# Patient Record
Sex: MALE | Race: WHITE | NOT HISPANIC OR LATINO | Employment: OTHER | URBAN - METROPOLITAN AREA
[De-identification: names, ages, dates, MRNs, and addresses within clinical notes are randomized per-mention and may not be internally consistent; named-entity substitution may affect disease eponyms.]

---

## 2017-01-31 ENCOUNTER — RADIANT APPOINTMENT (OUTPATIENT)
Dept: GENERAL RADIOLOGY | Facility: CLINIC | Age: 77
End: 2017-01-31
Attending: FAMILY MEDICINE
Payer: COMMERCIAL

## 2017-01-31 ENCOUNTER — OFFICE VISIT (OUTPATIENT)
Dept: FAMILY MEDICINE | Facility: CLINIC | Age: 77
End: 2017-01-31
Payer: COMMERCIAL

## 2017-01-31 VITALS
TEMPERATURE: 97.8 F | HEART RATE: 63 BPM | SYSTOLIC BLOOD PRESSURE: 133 MMHG | HEIGHT: 73 IN | OXYGEN SATURATION: 96 % | BODY MASS INDEX: 29.5 KG/M2 | RESPIRATION RATE: 12 BRPM | WEIGHT: 222.6 LBS | DIASTOLIC BLOOD PRESSURE: 71 MMHG

## 2017-01-31 DIAGNOSIS — R05.9 COUGH: ICD-10-CM

## 2017-01-31 DIAGNOSIS — C91.10 CLL (CHRONIC LYMPHOCYTIC LEUKEMIA) (H): ICD-10-CM

## 2017-01-31 DIAGNOSIS — D84.9 IMMUNOSUPPRESSION (H): Primary | ICD-10-CM

## 2017-01-31 DIAGNOSIS — J45.20 REACTIVE AIRWAY DISEASE, MILD INTERMITTENT, UNCOMPLICATED: ICD-10-CM

## 2017-01-31 PROCEDURE — 99214 OFFICE O/P EST MOD 30 MIN: CPT | Performed by: FAMILY MEDICINE

## 2017-01-31 PROCEDURE — 71020 XR CHEST 2 VW: CPT

## 2017-01-31 RX ORDER — ALBUTEROL SULFATE 90 UG/1
2 AEROSOL, METERED RESPIRATORY (INHALATION) EVERY 6 HOURS PRN
Qty: 1 INHALER | Refills: 0 | Status: SHIPPED | OUTPATIENT
Start: 2017-01-31 | End: 2017-05-12

## 2017-01-31 NOTE — NURSING NOTE
"Chief Complaint   Patient presents with     URI     cough for 2 months      Initial /71 mmHg  Pulse 63  Temp(Src) 97.8  F (36.6  C) (Oral)  Resp 12  Ht 6' 1\" (1.854 m)  Wt 222 lb 9.6 oz (100.971 kg)  BMI 29.37 kg/m2  SpO2 96% Estimated body mass index is 29.37 kg/(m^2) as calculated from the following:    Height as of this encounter: 6' 1\" (1.854 m).    Weight as of this encounter: 222 lb 9.6 oz (100.971 kg).  BP completed using cuff size large Right Arm    Health Maintenance reviewed - Yes: yes  Tobacco Verified: Yes  Family History Updated: Yes  MyChart Offered: Yes  Immunizations Up to Date:  Yes    Rose Espinoza MA     "

## 2017-01-31 NOTE — MR AVS SNAPSHOT
"              After Visit Summary   2017    Emory Clark    MRN: [id number]           Patient Information     Date Of Birth          1940        Visit Information        Provider Department      2017 11:15 AM Fabrice Humphreys MD [facility]        Today's Diagnoses     Immunosuppression (H)    -  1     Cough         CLL (chronic lymphocytic leukemia) (H)         Reactive airway disease, mild intermittent, uncomplicated            Follow-ups after your visit        Who to contact     If you have questions or need follow up information about today's clinic visit or your schedule please contact [facility] directly at [phone].  Normal or non-critical lab and imaging results will be communicated to you by Clueyhart, letter or phone within 4 business days after the clinic has received the results. If you do not hear from us within 7 days, please contact the clinic through Clueyhart or phone. If you have a critical or abnormal lab result, we will notify you by phone as soon as possible.  Submit refill requests through [employer] or call your pharmacy and they will forward the refill request to us. Please allow 3 business days for your refill to be completed.          Additional Information About Your Visit        MyChart Information     [employer] lets you send messages to your doctor, view your test results, renew your prescriptions, schedule appointments and more. To sign up, go to [email or web address] . Click on \"Log in\" on the left side of the screen, which will take you to the Welcome page. Then click on \"Sign up Now\" on the right side of the page.     You will be asked to enter the access code listed below, as well as some personal information. Please follow the directions to create your username and password.     Your access code is: [email or web address]  Expires: 2017 10:35 AM     Your access code will  in 90 days. If you need help or a new code, please " "call your [facility] or [phone].        Care EveryWhere ID     This is your Care EveryWhere ID. This could be used by other organizations to access your [place] medical records  [id number]        Your Vitals Were     Pulse Temperature Respirations Height BMI (Body Mass Index) Pulse Oximetry    63 97.8  F (36.6  C) (Oral) 12 6' 1\" (1.854 m) 29.37 kg/m2 96%       Blood Pressure from Last 3 Encounters:   01/31/17 133/71   11/29/16 124/70   12/01/15 120/65    Weight from Last 3 Encounters:   01/31/17 222 lb 9.6 oz (100.971 kg)   11/29/16 223 lb (101.152 kg)   12/01/15 194 lb (87.998 kg)              We Performed the Following     XR Chest 2 Views          Today's Medication Changes          These changes are accurate as of: 1/31/17 12:13 PM.  If you have any questions, ask your nurse or doctor.               Start taking these medicines.        Dose/Directions    albuterol 108 (90 BASE) MCG/ACT Inhaler   Commonly known as:  PROAIR HFA/PROVENTIL HFA/VENTOLIN HFA   Used for:  CLL (chronic lymphocytic leukemia) (H), Reactive airway disease, mild intermittent, uncomplicated   Started by:  Fabrice Humphreys MD        Dose:  2 puff   Inhale 2 puffs into the lungs every 6 hours as needed for shortness of breath / dyspnea or wheezing   Quantity:  1 Inhaler   Refills:  0            Where to get your medicines      These medications were sent to [facility] [address] [facility] - [place], [place] - [address]  [facility]  [address]  [facility], [facility] [address]     Phone:  [phone]    - albuterol 108 (90 BASE) MCG/ACT Inhaler             Primary Care Provider Office Phone # Fax #    Fabrice Humphreys [phone]       [facility] [id number] [address]        Thank you!     Thank you for choosing [facility]  for your care. Our goal is always to provide you with excellent care. Hearing back from our patients is one way we can continue to improve " our services. Please take a few minutes to complete the written survey that you may receive in the mail after your visit with us. Thank you!             Your Updated Medication List - Protect others around you: Learn how to safely use, store and throw away your medicines at www.disposemymeds.org.          This list is accurate as of: 1/31/17 12:13 PM.  Always use your most recent med list.                   Brand Name Dispense Instructions for use    albuterol 108 (90 BASE) MCG/ACT Inhaler    PROAIR HFA/PROVENTIL HFA/VENTOLIN HFA    1 Inhaler    Inhale 2 puffs into the lungs every 6 hours as needed for shortness of breath / dyspnea or wheezing       amoxicillin-clavulanate 875-125 MG per tablet    AUGMENTIN    20 tablet    Take 1 tablet by mouth 2 times daily       azithromycin 250 MG tablet    ZITHROMAX    6 tablet    Two tablets first day, then one tablet daily for four days.       CALCIUM 600+D3 600-400 MG-UNIT per tablet   Generic drug:  calcium-vitamin D      Take 1 tablet by mouth daily       IMBRUVICA 140 MG capsule   Generic drug:  ibrutinib      Take by mouth daily       MELATONIN PO      Take 3 mg by mouth nightly as needed       MULTIVITAMINS PO      Take 1 tablet by mouth daily       traZODone 50 MG tablet    DESYREL    135 tablet    Take one or two at bedtime as needed for sleep       VITAMIN B COMPLEX PO      Take 1 tablet by mouth daily       Vitamin C 500 MG Caps      Take 1 tablet by mouth daily

## 2017-01-31 NOTE — PROGRESS NOTES
SUBJECTIVE:                                                    Emory Clark is a 76 year old male who presents to clinic today for the following health issues:  He has CLL issues and has had a cough on and off for a month or two. NO sputum, no fever, no weight loss, no sweats, no malaise: he has ONCOLOGY at [facility]  HE'S on immunosuppressive meds. : I asked him to query the cough issue with his cancer doctors as potential med side effect or chemotherapy  No soboe    Acute Illness   Acute illness concerns: URI   Onset: 2 months      Fever: no     Chills/Sweats: no     Headache (location?): no     Sinus Pressure:no    Conjunctivitis:  no    Ear Pain: no    Rhinorrhea: no    Congestion: no    Sore Throat: no      Cough: YES    Wheeze: YES, at night     Decreased Appetite: no     Nausea: no     Vomiting: no     Diarrhea:  no     Dysuria/Freq.: no     Fatigue/Achiness: no     Sick/Strep Exposure: no      Therapies Tried and outcome: none       On exam the vital signs are stable  Weight is .mbi   Eyes show fernando   No neck masses or thyromegaly.Ear nose and throat shows normal   No bruits, murmers, rubs or extrasounds. No cardiomegaly or chest wall tenderness. Lungs: chest xray clear, He has some scattered wheeze  no abdominal masses or organomegaly. No CVA tenderness.  Skin eval no rash   No hernias, good range of motion neck, back and extremities. No abnormal skin lesions. Normal genitalia. Good peripheral pulses. No adenopathy.  Normal gait and stance. Neck is supple.  Back exam shows normal range of motion  (D89.9) Immunosuppression (H)  (primary encounter diagnosis)  Comment:   Plan:     (R05) Cough  Comment:   Plan: XR Chest 2 Views  He will Query his Oncologist        (C91.10) CLL (chronic lymphocytic leukemia) (H)  Comment:   Plan: XR Chest 2 Views, albuterol (PROAIR         HFA/PROVENTIL HFA/VENTOLIN HFA) 108 (90 BASE)         MCG/ACT Inhaler        bronchodilatro trial,    (J45.20) Reactive airway disease, mild  intermittent, uncomplicated  Comment:   Plan: albuterol (PROAIR HFA/PROVENTIL HFA/VENTOLIN         HFA) 108 (90 BASE) MCG/ACT Inhaler

## 2017-02-17 ENCOUNTER — TELEPHONE (OUTPATIENT)
Dept: FAMILY MEDICINE | Facility: CLINIC | Age: 77
End: 2017-02-17

## 2017-02-17 NOTE — TELEPHONE ENCOUNTER
Panel Management Review      Patient has the following on his problem list: None      Composite cancer screening  Chart review shows that this patient is due/due soon for the following None  Summary:    Patient is due/failing the following:   AAP and ACT    Action needed:   Patient needs to do ACT.    Type of outreach:    None, routed to provider for review.    Questions for provider review:    Dr. Humphreys- please add asthma to patients problem list                                                                                                                                    Marcelino Lorenz CMA       Chart routed to Provider.

## 2017-02-18 PROBLEM — J45.30 MILD PERSISTENT ASTHMA: Status: ACTIVE | Noted: 2017-02-18

## 2017-03-04 ENCOUNTER — TRANSFERRED RECORDS (OUTPATIENT)
Dept: HEALTH INFORMATION MANAGEMENT | Facility: CLINIC | Age: 77
End: 2017-03-04

## 2017-03-04 ENCOUNTER — HOSPITAL ENCOUNTER (EMERGENCY)
Facility: CLINIC | Age: 77
Discharge: HOME | End: 2017-03-04
Attending: EMERGENCY MEDICINE | Admitting: EMERGENCY MEDICINE
Payer: COMMERCIAL

## 2017-03-04 VITALS
DIASTOLIC BLOOD PRESSURE: 86 MMHG | RESPIRATION RATE: 18 BRPM | TEMPERATURE: 98.1 F | OXYGEN SATURATION: 92 % | HEART RATE: 60 BPM | SYSTOLIC BLOOD PRESSURE: 154 MMHG

## 2017-03-04 DIAGNOSIS — L03.032 PARONYCHIA OF FIFTH TOE OF LEFT FOOT: ICD-10-CM

## 2017-03-04 PROCEDURE — 87077 CULTURE AEROBIC IDENTIFY: CPT | Performed by: EMERGENCY MEDICINE

## 2017-03-04 PROCEDURE — 87186 SC STD MICRODIL/AGAR DIL: CPT | Performed by: EMERGENCY MEDICINE

## 2017-03-04 PROCEDURE — 87070 CULTURE OTHR SPECIMN AEROBIC: CPT | Performed by: EMERGENCY MEDICINE

## 2017-03-04 PROCEDURE — 99283 EMERGENCY DEPT VISIT LOW MDM: CPT

## 2017-03-04 RX ORDER — GINSENG 100 MG
CAPSULE ORAL
Status: DISCONTINUED
Start: 2017-03-04 | End: 2017-03-04 | Stop reason: HOSPADM

## 2017-03-04 RX ORDER — SULFAMETHOXAZOLE/TRIMETHOPRIM 800-160 MG
1 TABLET ORAL 2 TIMES DAILY
Qty: 20 TABLET | Refills: 0 | Status: SHIPPED | OUTPATIENT
Start: 2017-03-04 | End: 2017-03-14

## 2017-03-04 ASSESSMENT — ENCOUNTER SYMPTOMS
FEVER: 0
NAUSEA: 0
VOMITING: 0
COLOR CHANGE: 1
CHILLS: 0

## 2017-03-04 NOTE — ED NOTES
Patient presents with left small toe pain. He states that he pulled a hang nail a few days ago and now is havinng increased redness and swelling. He states that he noted puss draining from toe as well.

## 2017-03-04 NOTE — ED AVS SNAPSHOT
[facility]    [address] [place] [address]    Phone:  [phone]    Fax:  [phone]                                       Emory Clark   MRN: [id number]    Department:  [facility]   Date of Visit:  3/4/2017           Patient Information     Date Of Birth          1940        Your diagnoses for this visit were:     Paronychia of fifth toe of left foot        You were seen by Bertin Issa MD.      Follow-up Information     Please follow up.    Why:  Please recheck with your doctors at [place] [place] if not improved within 3 days. if you have fever, chills, increasing pain, spreading redness, or any other concernsreturn to the ER right away.        Discharge Instructions         Paronychia of the Finger or Toe  Paronychia is an infection near a fingernail or toenail. It usually occurs when an opening in the cuticle or an ingrown toenail lets bacteria under the skin.  The infection will need to be drained if pus is present. If the infection has been caught early, you may need only antibiotic treatment. Healing will take about 1 to 2 weeks.  Home care  Follow these guidelines when caring for yourself at home:    Clean and soak the toe or finger. Do this twice a day for the first 3 days. To do so:    Soak your foot or hand in a tub of warm water for 5 minutes. Or hold your toe or finger under a faucet of warm running water for 5 minutes.    Clean any crust away with soap and water using a cotton swab.    Put antibiotic ointment on the infected area.    Change the dressing daily or any time it becomes soiled.    If you were given antibiotics, take them as directed until they are all gone.    If your infection is on a toe, wear comfortable shoes with a lot of toe room. You can also wear open-toed sandals while your toe heals.    You may use acetaminophen or ibuprofen to help with pain, unless another medicine was  prescribed. If you have chronic liver or kidney disease, talk with your health care provider before using these medicines. Also talk with your provider if you've had a stomach ulcer or GI bleeding.  Prevention  The following can prevent paronychia:    Trim or push down the skin around the nail (cuticle).    Don't bite your nails.    Don't suck on your thumbs or fingers.  Follow-up care  Follow up with your health care provider, or as advised.  When to seek medical advice  Call your health care provider right away if any of these occur:    Redness, pain, or swelling of the finger or toe gets worse    Red streaks in the skin leading away from the wound    Pus or fluid drain from the nail area    Fever of 100.4 F (38 C) or higher, or as directed by your health care provider    0093-5741 [employer]. [address] [place] [address]. All rights reserved. This information is not intended as a substitute for professional medical care. Always follow your healthcare professional's instructions.          Cellulitis  Cellulitis is an infection of the deep layers of skin. A break in the skin, such as a cut or scratch, can let bacteria under the skin. If the bacteria get to deep layers of the skin, it can be serious. If not treated, cellulitis can get into the bloodstream and lymph nodes. The infection can then spread throughout the body. This causes serious illness.  Cellulitis causes the affected skin to become red, swollen, warm, and sore. The reddened areas have a visible border. An open sore may leak fluid (pus). You may have a fever, chills, and pain.  Cellulitis is treated with antibiotics taken for 7 to 10 days. An open sore may be cleaned and covered with cool wet gauze. Symptoms should get better 1 to 2 days after treatment is started. Make sure to take all the antibiotics for the full number of days until they are gone. Keep taking the medicine even if your symptoms go away.  Home care  Follow  these tips:    Limit the use of the part of your body with cellulitis. Movement can cause the infection to spread.    If the infection is on your leg, walk as little as possible in the first few days of the treatment. Keep your leg raised while sitting. This will help to reduce swelling.    Take all of the antibiotic medicine exactly as directed until it is gone. Do not miss any doses, especially during the first 7 days. Don t stop taking the medicine when your symptoms get better.    Keep the affected area clean and dry.    Wash your hands with soap and warm water before and after touching your skin. Anyone else who touches your skin should also wash his or her hands. Don't share towels.  Follow-up care  Follow up with your healthcare provider. If your infection does not go away on 1 antibiotic, your healthcare provider will prescribe a different one.  When to seek medical advice  Call your healthcare provider right away if any of these occur:    Red areas that spread    Swelling or pain that gets worse    Fluid leaking from the skin (pus)    Fever higher of 100.4  F (38.0  C) or higher after 2 days on antibiotics    9046-5450 [employer]. [address] [place] [address]. All rights reserved. This information is not intended as a substitute for professional medical care. Always follow your healthcare professional's instructions.          24 Hour Appointment Hotline       To make an appointment at any [facility] clinic, call [phone] ([phone]). If you don't have a family doctor or clinic, we will help you find one. [facility] clinics are conveniently located to serve the needs of you and your family.             Review of your medicines      START taking        Dose / Directions Last dose taken    sulfamethoxazole-trimethoprim 800-160 MG per tablet   Commonly known as:  BACTRIM DS   Dose:  1 tablet   Quantity:  20 tablet        Take 1 tablet by mouth 2 times daily for 10 days    Refills:  0          Our records show that you are taking the medicines listed below. If these are incorrect, please call your family doctor or clinic.        Dose / Directions Last dose taken    albuterol 108 (90 BASE) MCG/ACT Inhaler   Commonly known as:  PROAIR HFA/PROVENTIL HFA/VENTOLIN HFA   Dose:  2 puff   Quantity:  1 Inhaler        Inhale 2 puffs into the lungs every 6 hours as needed for shortness of breath / dyspnea or wheezing   Refills:  0        amoxicillin-clavulanate 875-125 MG per tablet   Commonly known as:  AUGMENTIN   Dose:  1 tablet   Quantity:  20 tablet        Take 1 tablet by mouth 2 times daily for 10 days   Refills:  0        azithromycin 250 MG tablet   Commonly known as:  ZITHROMAX   Quantity:  6 tablet        Two tablets first day, then one tablet daily for four days.   Refills:  0        CALCIUM 600+D3 600-400 MG-UNIT per tablet   Dose:  1 tablet   Generic drug:  calcium-vitamin D        Take 1 tablet by mouth daily   Refills:  0        IMBRUVICA 140 MG capsule   Generic drug:  ibrutinib        Take by mouth daily   Refills:  0        MELATONIN PO   Dose:  3 mg        Take 3 mg by mouth nightly as needed   Refills:  0        MULTIVITAMINS PO   Dose:  1 tablet        Take 1 tablet by mouth daily   Refills:  0        traZODone 50 MG tablet   Commonly known as:  DESYREL   Quantity:  135 tablet        Take one or two at bedtime as needed for sleep   Refills:  1        VITAMIN B COMPLEX PO   Dose:  1 tablet        Take 1 tablet by mouth daily   Refills:  0        Vitamin C 500 MG Caps   Dose:  1 tablet        Take 1 tablet by mouth daily   Refills:  0                Prescriptions were sent or printed at these locations (2 Prescriptions)                   Other Prescriptions                Printed at Department/Unit printer (2 of 2)         sulfamethoxazole-trimethoprim (BACTRIM DS) 800-160 MG per tablet               amoxicillin-clavulanate (AUGMENTIN) 875-125 MG per tablet                 Procedures and tests performed during your visit     Wound Culture Aerobic Bacterial      Orders Needing Specimen Collection     None      Pending Results     Date and Time Order Name Status Description    3/4/2017 1809 Wound Culture Aerobic Bacterial In process             Pending Culture Results     Date and Time Order Name Status Description    3/4/2017 1809 Wound Culture Aerobic Bacterial In process              Test Results from your hospital stay     3/4/2017  6:17 PM - Interface, Flexilab Results                Clinical Quality Measure: Blood Pressure Screening     Your blood pressure was checked while you were in the emergency department today. The last reading we obtained was  BP: 145/74 . Please read the guidelines below about what these numbers mean and what you should do about them.  If your systolic blood pressure (the top number) is less than 120 and your diastolic blood pressure (the bottom number) is less than 80, then your blood pressure is normal. There is nothing more that you need to do about it.  If your systolic blood pressure (the top number) is 120-139 or your diastolic blood pressure (the bottom number) is 80-89, your blood pressure may be higher than it should be. You should have your blood pressure rechecked within a year by a primary care provider.  If your systolic blood pressure (the top number) is 140 or greater or your diastolic blood pressure (the bottom number) is 90 or greater, you may have high blood pressure. High blood pressure is treatable, but if left untreated over time it can put you at risk for heart attack, stroke, or kidney failure. You should have your blood pressure rechecked by a primary care provider within the next 4 weeks.  If your provider in the emergency department today gave you specific instructions to follow-up with your doctor or provider even sooner than that, you should follow that instruction and not wait for up to 4 weeks for your follow-up visit.       "  Thank you for choosing [facility]       Thank you for choosing [facility] for your care. Our goal is always to provide you with excellent care. Hearing back from our patients is one way we can continue to improve our services. Please take a few minutes to complete the written survey that you may receive in the mail after you visit with us. Thank you!        [employer] Information     [employer] lets you send messages to your doctor, view your test results, renew your prescriptions, schedule appointments and more. To sign up, go to [email or web address] . Click on \"Log in\" on the left side of the screen, which will take you to the Welcome page. Then click on \"Sign up Now\" on the right side of the page.     You will be asked to enter the access code listed below, as well as some personal information. Please follow the directions to create your username and password.     Your access code is: [email or web address]  Expires: 2017  6:12 PM     Your access code will  in 90 days. If you need help or a new code, please call your [facility] clinic or [phone].        Care EveryWhere ID     This is your Care EveryWhere ID. This could be used by other organizations to access your [facility] medical records  [id number]        After Visit Summary       This is your record. Keep this with you and show to your community pharmacist(s) and doctor(s) at your next visit.                  "

## 2017-03-04 NOTE — ED AVS SNAPSHOT
[facility]    [address]    [facility] [address]    Phone:  [phone]    Fax:  [phone]                                       Emory Clark   MRN: [id number]    Department:  [facility]   Date of Visit:  3/4/2017           After Visit Summary Signature Page     I have received my discharge instructions, and my questions have been answered. I have discussed any challenges I see with this plan with the nurse or doctor.    ..........................................................................................................................................  Patient/Patient Representative Signature      ..........................................................................................................................................  Patient Representative Print Name and Relationship to Patient    ..................................................               ................................................  Date                                            Time    ..........................................................................................................................................  Reviewed by Signature/Title    ...................................................              ..............................................  Date                                                            Time

## 2017-03-04 NOTE — ED PROVIDER NOTES
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 The patient states that about 2-3 days ago he had a \"hang-nail\" to his left small toe which he subsequently pulled. This action caused him pain initially and his toe has been moderately painful since that time, but he though nothing of it. During the course of the day today however, he noted that his toe became acutely more painful and \"cherry red,\" with additional spreading redness to the top of his foot. His toe expressed some pus about 2 hours prior to arrival with significant improvement in his pain. He has otherwise been feeling well with no fevers, nausea, vomiting, or other systemic symptoms.     Allergies:  Altaryl    Medications:    Albuterol  Azithromycin  Imbruvica    Past Medical History:    Asthma   GERD  CLL  Hyperlipidemia      Past Surgical History:    Appendectomy   Cholecystectomy    Family History:    Father positive for CLL  Mother positive for Parkinson's disease     Social History:  Negative for tobacco use.  Positive for alcohol use.   The patient and his wife plan on wintering in [facility] and will be leaving tomorrow; they are very excited.   Marital Status:   [2]    Review of Systems   Constitutional: Negative for chills and fever.   Gastrointestinal: Negative for nausea and vomiting.   Skin: Positive for color change (redness to top of foot and L 5th toe).   All other systems reviewed and are negative.    Physical Exam   First Vitals:  BP: 161/69  Pulse: 60  Heart Rate: 60  Temp: 98.1  F (36.7  C)  Resp: 18  SpO2: 99 %      Physical Exam  Constitutional:  Appears well-developed and well-nourished. Alert. Conversant. Non toxic.       HENT:   Head: Atraumatic.   Nose: Nose normal.   Mouth/Throat: Oropharynx is clear and moist.   Eyes: Conjunctivae normal. EOM are grossly normal. Pupils are equal, " round, and reactive to light. No scleral icterus.   Neck: Normal range of motion. No tracheal deviation present.   Cardiovascular: Normal rate, regular rhythm. Symmetric DP artery pulses.  Pulmonary/Chest: Effort normal. No stridor. No respiratory distress.  Musculoskeletal: Normal except for Left foot. No other abnormality noted.   Left lower extremity: hip, thigh, knee, calf, shin, ankle are normal.  Foot  No tenderness or swelling over the heel or midfoot.  There is mild erythema on the dorsum of the left foot spreading proximally from the 5th toe with a small tendril what appears to be ascending lymphangitis about two thirds of the way toward the ankle.   The toes are normal save for the 5th toe which is erythematous and red.   The lateral portion of the toenail plate is gone which is consistent with the patient's reported history of removing it at home.  There is a small amount of purulent drainage from underneath the toenail border consistent with a draining paronychia..  No crepitus.   I was able to palpate the toe when I don't feel any large contained fluid collection. He has normal capillary refill in the toes.  There is a small amount of dry hard skin consistent with a callus on the tip of the 5th toe,, but I don't think this represents chronic gangrene.  Normal ROM in the toes.   Neurological: Alert and oriented to person, place, and time. Normal strength. CN II-VII intact. No sensory deficit. GCS eye subscore is 4. GCS verbal subscore is 5. GCS motor subscore is 6. Normal coordination . Intact distal sensory and motor function.  Skin: Skin is warm and dry. No rash noted. No pallor. Normal capillary refill.  Psychiatric:  normal mood and affect.    Emergency Department Course     Nursing notes and vitals reviewed. I performed an exam of the patient as documented above.     Wound cultures sent and pending.     Findings and plan explained to the Patient. Patient discharged home with instructions regarding  supportive care, medications, and reasons to return. The importance of close follow-up was reviewed.     Impression & Plan    Medical Decision Making:  Emory Clark is a 76 year old male currently on Imbruvica for CLL who follows with oncology at [place]. This is felt to be well controlled. He is here with pain and redness, swelling, and purulent drainage from his left 5th toe after he pulled out a hang nail about 2 days ago. He was sent over from a minute[facility]clinic with concern for a paronychia and with concern that he may need to have his toenail removed and provided IV antibiotics.     He is non-toxic here in the ED, but does have evidence of a paronychia on that side as well as left toe cellulitis and some ascending lymphangitis up the dorsum of his left foot. He is otherwise non-toxic and afebrile. In terms of the paronychia; it is already draining pus. We discussed the definitive treatment would be toenail removal or at least an I and D along the edge of the nail plate, but the patient would like to avoid this. Given that this is already draining it seems reasonable to try to treat this with antibiotics before performing another intervention, however we did discuss the possibility of a loculated infection and he understands that this may be improve on antibiotics alone and that he may require an I and D: he is willing and would prefer to wait. Likewise, with his history of CLL, he is at higher risk than average for sepsis from this infection. I recommended that we at least check some lab testing to look at his blood counts, but he declines these as he is in a hurry to leave. Therefore, will start him on antibiotics and have selected Augmentin which will provide good coverage for Staph and Strep, as well as other gram negative infections on the foot and will also add bactrim for possible community acquired MRSA. We were able to sterilely prep his toe and then milk some pus from the toenail which came out easily  which would suggest adequate drainage and we were able to send a wound culture from this drainage and will assist to tailor antibiotic therapy.     The patient is traveling to [place] and leaving in the morning. At this point, with reasonable clinical confidence, I think he is safe to travel. He will follow up with [facility] in [facility]. He is counseled to follow up within 3 days without tremendous improvement or come to the ED immediately should he have increasing redness, fever, pain, body aches, or any other concerns.     Diagnosis:    ICD-10-CM    1. Paronychia of fifth toe of left foot L03.032 Wound Culture Aerobic Bacterial     Discharge Medications:  Discharge Medication List as of 3/4/2017  6:20 PM      START taking these medications    Details   sulfamethoxazole-trimethoprim (BACTRIM DS) 800-160 MG per tablet Take 1 tablet by mouth 2 times daily for 10 days, Disp-20 tablet, R-0, Local Print           IJr, am serving as a scribe on 3/4/2017 at 5:42 PM to personally document services performed by Bertin Issa MD based on my observations and the provider's statements to me.     3/4/2017   [facility] EMERGENCY DEPARTMENT       Bertin Issa MD  03/05/17 2111

## 2017-03-05 NOTE — DISCHARGE INSTRUCTIONS
Paronychia of the Finger or Toe  Paronychia is an infection near a fingernail or toenail. It usually occurs when an opening in the cuticle or an ingrown toenail lets bacteria under the skin.  The infection will need to be drained if pus is present. If the infection has been caught early, you may need only antibiotic treatment. Healing will take about 1 to 2 weeks.  Home care  Follow these guidelines when caring for yourself at home:    Clean and soak the toe or finger. Do this twice a day for the first 3 days. To do so:    Soak your foot or hand in a tub of warm water for 5 minutes. Or hold your toe or finger under a faucet of warm running water for 5 minutes.    Clean any crust away with soap and water using a cotton swab.    Put antibiotic ointment on the infected area.    Change the dressing daily or any time it becomes soiled.    If you were given antibiotics, take them as directed until they are all gone.    If your infection is on a toe, wear comfortable shoes with a lot of toe room. You can also wear open-toed sandals while your toe heals.    You may use acetaminophen or ibuprofen to help with pain, unless another medicine was prescribed. If you have chronic liver or kidney disease, talk with your health care provider before using these medicines. Also talk with your provider if you've had a stomach ulcer or GI bleeding.  Prevention  The following can prevent paronychia:    Trim or push down the skin around the nail (cuticle).    Don't bite your nails.    Don't suck on your thumbs or fingers.  Follow-up care  Follow up with your health care provider, or as advised.  When to seek medical advice  Call your health care provider right away if any of these occur:    Redness, pain, or swelling of the finger or toe gets worse    Red streaks in the skin leading away from the wound    Pus or fluid drain from the nail area    Fever of 100.4 F (38 C) or higher, or as directed by your health care provider    8676-1656  [employer]. [address] [place] [address], [place] [address]. All rights reserved. This information is not intended as a substitute for professional medical care. Always follow your healthcare professional's instructions.          Cellulitis  Cellulitis is an infection of the deep layers of skin. A break in the skin, such as a cut or scratch, can let bacteria under the skin. If the bacteria get to deep layers of the skin, it can be serious. If not treated, cellulitis can get into the bloodstream and lymph nodes. The infection can then spread throughout the body. This causes serious illness.  Cellulitis causes the affected skin to become red, swollen, warm, and sore. The reddened areas have a visible border. An open sore may leak fluid (pus). You may have a fever, chills, and pain.  Cellulitis is treated with antibiotics taken for 7 to 10 days. An open sore may be cleaned and covered with cool wet gauze. Symptoms should get better 1 to 2 days after treatment is started. Make sure to take all the antibiotics for the full number of days until they are gone. Keep taking the medicine even if your symptoms go away.  Home care  Follow these tips:    Limit the use of the part of your body with cellulitis. Movement can cause the infection to spread.    If the infection is on your leg, walk as little as possible in the first few days of the treatment. Keep your leg raised while sitting. This will help to reduce swelling.    Take all of the antibiotic medicine exactly as directed until it is gone. Do not miss any doses, especially during the first 7 days. Don t stop taking the medicine when your symptoms get better.    Keep the affected area clean and dry.    Wash your hands with soap and warm water before and after touching your skin. Anyone else who touches your skin should also wash his or her hands. Don't share towels.  Follow-up care  Follow up with your healthcare provider. If your infection does not go away on 1  antibiotic, your healthcare provider will prescribe a different one.  When to seek medical advice  Call your healthcare provider right away if any of these occur:    Red areas that spread    Swelling or pain that gets worse    Fluid leaking from the skin (pus)    Fever higher of 100.4  F (38.0  C) or higher after 2 days on antibiotics    6308-2412 [employer]. [address] [place] [address]. All rights reserved. This information is not intended as a substitute for professional medical care. Always follow your healthcare professional's instructions.

## 2017-03-06 LAB
BACTERIA SPEC CULT: ABNORMAL
MICRO REPORT STATUS: ABNORMAL
MICROORGANISM SPEC CULT: ABNORMAL
SPECIMEN SOURCE: ABNORMAL

## 2017-03-07 ENCOUNTER — TELEPHONE (OUTPATIENT)
Dept: EMERGENCY MEDICINE | Facility: CLINIC | Age: 77
End: 2017-03-07

## 2017-03-07 NOTE — TELEPHONE ENCOUNTER
"[facility] Emergency Department Lab result notification:    [place] ED lab result protocol used  General culture protocol - Wound    Reason for call  Notify of lab results, assess symptoms,  review ED providers recommendations/discharge instructions (if necessary) and advise per ED lab result f/u protocol    Lab Result  Final Wound culture report on 03/07/2017  [place] ED discharge antibiotic: Sulfamethoxazole-Trimethoprim (Bactrim DS, Septra DS) 800-160 mg PO tablet, Take 1 tablet by mouth 2 times daily for 10 days and Cephalexin (Keflex) 500 mg capsule, Take 1 tablet by mouth 2 times daily for 10 days  #1. Bacteria, Moderate growth Staphylococcus aureus , which is SUSCEPTIBLE to ED discharge antibiotic - Both  Incision and Drainage performed in [place] ED [Yes / No]: no  Patient to be notified of result, symptoms's assessed and advised per [place] ED lab result protocol.  Information table from ED Provider visit on 03/04/2017  Symptoms reported at ED visit (Chief complaint, HPI) a 76 year old male with history of CLL on Imbruvica who presents with his wife for evaluation of left 5th toe pain. The patient states that about 2-3 days ago he had a \"hang-nail\" to his left small toe which he subsequently pulled. This action caused him pain initially and his toe has been moderately painful since that time, but he though nothing of it. During the course of the day today however, he noted that his toe became acutely more painful and \"cherry red,\" with additional spreading redness to the top of his foot. His toe expressed some pus about 2 hours prior to arrival with significant improvement in his pain. He has otherwise been feeling well with no fevers, nausea, vomiting, or other systemic symptoms.    ED providers Impression and Plan (applicable information) In terms of the paronychia; it is already draining pus. We discussed the definitive treatment would be toenail removal or at least an I and D along the edge of " the nail plate, but the patient would like to avoid this. Given that this is already draining it seems reasonable to try to treat this with antibiotics before performing another intervention, however we did discuss the possibility of a loculated infection and he understands that this may be improve on antibiotics alone and that he may require an I and D: he is willing and would prefer to wait. Likewise, with his history of CLL, he is at higher risk than average for sepsis from this infection. I recommended that we at least check some lab testing to look at his blood counts, but he declines these as he is in a hurry to leave. Therefore, will start him on antibiotics and have selected Augmentin which will provide good coverage for Staph and Strep, as well as other gram negative infections on the foot and will also add bactrim for possible community acquired MRSA. We were able to sterilely prep his toe and then milk some pus from the toenail which came out easily which would suggest adequate drainage and we were able to send a wound culture from this drainage and will assist to tailor antibiotic therapy     RN Assessment (Patient s current Symptoms), include time called.  [Insert Left message here if message left]  At [address] its not red any more and almost back to normal.    RN Recommendations/Instructions per [place] ED lab result protocol  Continue antibiotics, regardless of symptoms until they are gone.    Please Contact your PCP clinic or return to the Emergency department if your:    Symptoms return    Symptoms worsen or other concerning symptom's.    Emily Maria RN  [facility] RN  Lung Nodule and ED Lab Result F/u RN  Epic pool (ED late result f/u RN): [id number]  FV INCIDENTAL RADIOLOGY F/U NURSES: P [id number]  # [phone]      Copy of Lab result   Exam Information   Exam Date Exam Time Accession # Results    3/4/17  6:00 PM     Component Results   Component Collected Lab    Specimen Description 03/04/2017  6:00 PM 77   Toe 5TH Wound   Culture Micro (Abnormal) 03/04/2017  6:00    Moderate growth Staphylococcus aureus   Micro Report Status 03/04/2017  6:00    FINAL 03/06/2017   Organism: 03/04/2017  6:00    Moderate growth Staphylococcus aureus   Culture & Susceptibility   MODERATE GROWTH STAPHYLOCOCCUS AUREUS (BIJU)   Antibiotic Sensitivity Unit Status   CIPROFLOXACIN <=0.5 Susceptible ug/mL Final   CLINDAMYCIN <=0.25 Susceptible ug/mL Final   ERYTHROMYCIN <=0.25 Susceptible ug/mL Final   GENTAMICIN <=0.5 Susceptible ug/mL Final   LEVOFLOXACIN <=0.12 Susceptible ug/mL Final   OXACILLIN 0.5 Susceptible ug/mL Final   PENICILLIN >=0.5 Resistant ug/mL Final   TETRACYCLINE <=1 Susceptible ug/mL Final   Trimethoprim/Sulfa <=.5/9.5 Susceptible ug/mL Final   VANCOMYCIN 1 Susceptible ug/mL Final

## 2017-05-12 ENCOUNTER — OFFICE VISIT (OUTPATIENT)
Dept: FAMILY MEDICINE | Facility: CLINIC | Age: 77
End: 2017-05-12
Payer: COMMERCIAL

## 2017-05-12 VITALS
HEIGHT: 72 IN | TEMPERATURE: 97.6 F | BODY MASS INDEX: 30.2 KG/M2 | RESPIRATION RATE: 16 BRPM | SYSTOLIC BLOOD PRESSURE: 142 MMHG | WEIGHT: 223 LBS | OXYGEN SATURATION: 96 % | DIASTOLIC BLOOD PRESSURE: 75 MMHG | HEART RATE: 62 BPM

## 2017-05-12 DIAGNOSIS — C91.10 CHRONIC LYMPHOCYTIC LEUKEMIA (H): ICD-10-CM

## 2017-05-12 DIAGNOSIS — J45.30 MILD PERSISTENT ASTHMA: ICD-10-CM

## 2017-05-12 DIAGNOSIS — J20.9 ACUTE BRONCHITIS, UNSPECIFIED ORGANISM: Primary | ICD-10-CM

## 2017-05-12 PROCEDURE — 99213 OFFICE O/P EST LOW 20 MIN: CPT | Performed by: FAMILY MEDICINE

## 2017-05-12 RX ORDER — AZITHROMYCIN 250 MG/1
TABLET, FILM COATED ORAL
Qty: 6 TABLET | Refills: 0 | Status: SHIPPED | OUTPATIENT
Start: 2017-05-12 | End: 2017-05-22

## 2017-05-12 NOTE — MR AVS SNAPSHOT
"              After Visit Summary   2017    Emory Clark    MRN: [id number]           Patient Information     Date Of Birth          1940        Visit Information        Provider Department      2017 1:15 PM Ari Boland MD [facility]        Today's Diagnoses     Acute bronchitis, unspecified organism    -  1       Follow-ups after your visit        Who to contact     If you have questions or need follow up information about today's clinic visit or your schedule please contact [facility] directly at [phone].  Normal or non-critical lab and imaging results will be communicated to you by [facility], letter or phone within 4 business days after the clinic has received the results. If you do not hear from us within 7 days, please contact the clinic through [employer] [facility] or phone. If you have a critical or abnormal lab result, we will notify you by phone as soon as possible.  Submit refill requests through [employer] or call your pharmacy and they will forward the refill request to us. Please allow 3 business days for your refill to be completed.          Additional Information About Your Visit        MyChart Information     [employer] lets you send messages to your doctor, view your test results, renew your prescriptions, schedule appointments and more. To sign up, go to [email or web address] . Click on \"Log in\" on the left side of the screen, which will take you to the Welcome page. Then click on \"Sign up Now\" on the right side of the page.     You will be asked to enter the access code listed below, as well as some personal information. Please follow the directions to create your username and password.     Your access code is: [email or web address]  Expires: 2017  7:12 PM     Your access code will  in 90 days. If you need help or a new code, please call your [facility] or [phone].        Care EveryWhere ID     This is your Care EveryWhere ID. This could be used " by other organizations to access your [place] medical records  [id number]        Your Vitals Were     Pulse Temperature Respirations Height Pulse Oximetry BMI (Body Mass Index)    62 97.6  F (36.4  C) (Oral) 16 6' (1.829 m) 96% 30.24 kg/m2       Blood Pressure from Last 3 Encounters:   05/12/17 142/75   03/04/17 154/86   01/31/17 133/71    Weight from Last 3 Encounters:   05/12/17 223 lb (101.2 kg)   01/31/17 222 lb 9.6 oz (101 kg)   11/29/16 223 lb (101.2 kg)              Today, you had the following     No orders found for display         Today's Medication Changes          These changes are accurate as of: 5/12/17  1:46 PM.  If you have any questions, ask your nurse or doctor.               Start taking these medicines.        Dose/Directions    azithromycin 250 MG tablet   Commonly known as:  ZITHROMAX   Used for:  Acute bronchitis, unspecified organism   Started by:  Ari Boland MD        Take 2 pills today, then 1 pill for 4 days.   Quantity:  6 tablet   Refills:  0            Where to get your medicines      These medications were sent to [facility] [address] [facility] - [place], [place] - [address] [facility]  [address] [facility] [address]     Phone:  [phone]     azithromycin 250 MG tablet                Primary Care Provider Office Phone # Fax #    Fabrice Humphreys [phone]       [facility] [id number] [place] [id number]        Thank you!     Thank you for choosing [facility]  for your care. Our goal is always to provide you with excellent care. Hearing back from our patients is one way we can continue to improve our services. Please take a few minutes to complete the written survey that you may receive in the mail after your visit with us. Thank you!             Your Updated Medication List - Protect others around you: Learn how to safely use, store and throw away your medicines at www.disposemymeds.org.          This list is  accurate as of: 5/12/17  1:46 PM.  Always use your most recent med list.                   Brand Name Dispense Instructions for use    azithromycin 250 MG tablet    ZITHROMAX    6 tablet    Take 2 pills today, then 1 pill for 4 days.       CALCIUM 600+D3 600-400 MG-UNIT per tablet   Generic drug:  calcium-vitamin D      Take 1 tablet by mouth daily       IMBRUVICA 140 MG capsule   Generic drug:  ibrutinib      Take by mouth daily       MULTIVITAMINS PO      Take 1 tablet by mouth daily       traZODone 50 MG tablet    DESYREL    135 tablet    Take one or two at bedtime as needed for sleep       VITAMIN B COMPLEX PO      Take 1 tablet by mouth daily       Vitamin C 500 MG Caps      Take 1 tablet by mouth daily

## 2017-05-12 NOTE — LETTER
My Asthma Action Plan  Name: Emory Clark   YOB: 1940  Date: 5/15/2017   My doctor: Ari Boland MD   My clinic: [facility]        My Control Medicine:   My Rescue Medicine:    My Asthma Severity: mild persistent  Avoid your asthma triggers: smoke, upper respiratory infections, dust mites, pollens, animal dander, insects/rodents, mold, humidity, aspirin, strong odors and fumes, occupational exposure, exercise or sports, emotions, cold air and Gastric Reflux               GREEN ZONE     Good Control    I feel good    No cough or wheeze    Can work, sleep and play without asthma symptoms       Take your asthma control medicine every day.     1. If exercise triggers your asthma, take your rescue medication    15 minutes before exercise or sports, and    During exercise if you have asthma symptoms  2. Spacer to use with inhaler: If you have a spacer, make sure to use it with your inhaler             YELLOW ZONE     Getting Worse  I have ANY of these:    I do not feel good    Cough or wheeze    Chest feels tight    Wake up at night   1. Keep taking your Green Zone medications  2. Start taking your rescue medicine:    every 20 minutes for up to 1 hour. Then every 4 hours for 24-48 hours.  3. If you stay in the Yellow Zone for more than 12-24 hours, contact your doctor.  4. If you do not return to the Green Zone in 12-24 hours or you get worse, start taking your oral steroid medicine if prescribed by your provider.           RED ZONE     Medical Alert - Get Help  I have ANY of these:    I feel awful    Medicine is not helping    Breathing getting harder    Trouble walking or talking    Nose opens wide to breathe       1. Take your rescue medicine NOW  2. If your provider has prescribed an oral steroid medicine, start taking it NOW  3. Call your doctor NOW  4. If you are still in the Red Zone after 20 minutes and you have not reached your doctor:    Take your rescue medicine again  and    Call 911 or go to the emergency room right away    See your regular doctor within 2 weeks of an Emergency Room or Urgent Care visit for follow-up treatment.        Electronically signed by: Sonia Kern, May 15, 2017    Annual Reminders:  Meet with Asthma Educator,  Flu Shot in the Fall, consider Pneumonia Vaccination for patients with asthma (aged 19 and older).    Pharmacy: [facility] [phone] [place] [id number]  [address]                    Asthma Triggers  How To Control Things That Make Your Asthma Worse    Triggers are things that make your asthma worse.  Look at the list below to help you find your triggers and what you can do about them.  You can help prevent asthma flare-ups by staying away from your triggers.      Trigger                                                          What you can do   Cigarette Smoke  Tobacco smoke can make asthma worse. Do not allow smoking in your home, car or around you.  Be sure no one smokes at a child s day care or school.  If you smoke, ask your health care provider for ways to help you quit.  Ask family members to quit too.  Ask your health care provider for a referral to Quit Plan to help you quit smoking, or call 4-445-162-PLAN.     Colds, Flu, Bronchitis  These are common triggers of asthma. Wash your hands often.  Don t touch your eyes, nose or mouth.  Get a flu shot every year.     Dust Mites  These are tiny bugs that live in cloth or carpet. They are too small to see. Wash sheets and blankets in hot water every week.   Encase pillows and mattress in dust mite proof covers.  Avoid having carpet if you can. If you have carpet, vacuum weekly.   Use a dust mask and HEPA vacuum.   Pollen and Outdoor Mold  Some people are allergic to trees, grass, or weed pollen, or molds. Try to keep your windows closed.  Limit time out doors when pollen count is high.   Ask you health care provider about taking medicine during allergy season.     Animal  Dander  Some people are allergic to skin flakes, urine or saliva from pets with fur or feathers. Keep pets with fur or feathers out of your home.    If you can t keep the pet outdoors, then keep the pet out of your bedroom.  Keep the bedroom door closed.  Keep pets off cloth furniture and away from stuffed toys.     Mice, Rats, and Cockroaches  Some people are allergic to the waste from these pests.   Cover food and garbage.  Clean up spills and food crumbs.  Store grease in the refrigerator.   Keep food out of the bedroom.   Indoor Mold  This can be a trigger if your home has high moisture. Fix leaking faucets, pipes, or other sources of water.   Clean moldy surfaces.  Dehumidify basement if it is damp and smelly.   Smoke, Strong Odors, and Sprays  These can reduce air quality. Stay away from strong odors and sprays, such as perfume, powder, hair spray, paints, smoke incense, paint, cleaning products, candles and new carpet.   Exercise or Sports  Some people with asthma have this trigger. Be active!  Ask your doctor about taking medicine before sports or exercise to prevent symptoms.    Warm up for 5-10 minutes before and after sports or exercise.     Other Triggers of Asthma  Cold air:  Cover your nose and mouth with a scarf.  Sometimes laughing or crying can be a trigger.  Some medicines and food can trigger asthma.

## 2017-05-12 NOTE — PROGRESS NOTES
SUBJECTIVE:                                                    Emory Clark is a 76 year old male who presents to clinic today for the following health issues:      Acute Illness   Acute illness concerns: cough  Onset: 1 week    Fever: no    Chills/Sweats: no    Headache (location?): YES    Sinus Pressure:YES- post-nasal drainage and facial pain    Conjunctivitis:  YES: bilateral    Ear Pain: no    Rhinorrhea: YES    Congestion: YES    Sore Throat: no     Cough: YES-productive of clear sputum    Wheeze: YES    Decreased Appetite: no    Nausea: no    Vomiting: no    Diarrhea:  no    Dysuria/Freq.: no    Fatigue/Achiness: YES    Sick/Strep Exposure: no     Therapies Tried and outcome: OTC medications.      Pt does have hx of lymphoma, and he is on Imbruvica     Problem list and histories reviewed & adjusted, as indicated.  Additional history: as documented    Patient Active Problem List   Diagnosis     Chronic lymphocytic leukemia (H)     Elevated PSA     Insomnia     GERD (gastroesophageal reflux disease)     CARDIOVASCULAR SCREENING; LDL GOAL LESS THAN 160     Acute renal failure (H)     Pneumonia     Abnormal EKG     Elevated brain natriuretic peptide (BNP) level     URI (upper respiratory infection)     Health Care Home     Pleural effusion     NSTEMI (non-ST elevated myocardial infarction) (H)     Cardiomyopathy (H)     Anemia associated with chemotherapy     Immunosuppression (H)     Mild persistent asthma     Past Surgical History:   Procedure Laterality Date     basal cell excision       CHOLECYSTECTOMY       LAPAROSCOPIC APPENDECTOMY         Social History   Substance Use Topics     Smoking status: Never Smoker     Smokeless tobacco: Never Used     Alcohol use 0.0 oz/week     0 Standard drinks or equivalent per week      Comment: rare     Family History   Problem Relation Age of Onset     Neurologic Disorder Mother      Parkinsons     Other Cancer Father      CLL         Current Outpatient Prescriptions    Medication Sig Dispense Refill     azithromycin (ZITHROMAX) 250 MG tablet Take 2 pills today, then 1 pill for 4 days. 6 tablet 0     ibrutinib (IMBRUVICA) 140 MG capsule Take by mouth daily       traZODone (DESYREL) 50 MG tablet Take one or two at bedtime as needed for sleep 135 tablet 1     Multiple Vitamin (MULTIVITAMINS PO) Take 1 tablet by mouth daily        B Complex Vitamins (VITAMIN B COMPLEX PO) Take 1 tablet by mouth daily        Ascorbic Acid (VITAMIN C) 500 MG CAPS Take 1 tablet by mouth daily        calcium-vitamin D (CALCIUM 600+D3) 600-400 MG-UNIT per tablet Take 1 tablet by mouth daily       Allergies   Allergen Reactions     Antihistamine [Altaryl]        Reviewed and updated as needed this visit by clinical staff  Tobacco  Allergies  Fam Hx  Soc Hx      Reviewed and updated as needed this visit by Provider         ROS:  C: NEGATIVE for fever, chills, change in weight  RESP:occasional shortness of breath when he cough.  CV: NEGATIVE for chest pain, palpitations or peripheral edema    OBJECTIVE:                                                    /75 (BP Location: Right arm, Patient Position: Chair, Cuff Size: Adult Regular)  Pulse 62  Temp 97.6  F (36.4  C) (Oral)  Resp 16  Ht 6' (1.829 m)  Wt 223 lb (101.2 kg)  SpO2 96%  BMI 30.24 kg/m2  Body mass index is 30.24 kg/(m^2).  Head: Normocephalic, atraumatic.  Eyes: Conjunctiva clear, non icteric. PERRLA.  Ears: External ears nl, TM is nl   Nose: Septum midline, nasal mucosa congested. No discharge.  There is no tenderness over the maxillary sinuses, there is no tenderness over the frontal sinuses  Mouth / Throat: Normal dentition.  No oral lesions. Pharynx mild erythematous, tonsils no exudate/hypertrophy.  Neck: Supple, no enlarged LN, trachea midline.  LUNGS:  CTA B/L, no wheezing or crackles.  CVS : RRR, no murmur, no rub.             ASSESSMENT/PLAN:                                                            1. Acute bronchitis,  unspecified organism  Given the patient hx of CLL on chemotherapy, I will star ton   - azithromycin (ZITHROMAX) 250 MG tablet; Take 2 pills today, then 1 pill for 4 days.  Dispense: 6 tablet; Refill: 0    2. Chronic lymphocytic leukemia (H)        Follow up in 5 days if symptoms persist, sooner if symptoms worsen or new ones develops, pt may contact us over the phone for any questions or concerns.      Ari Boland MD  [facility]

## 2017-05-15 ENCOUNTER — TELEPHONE (OUTPATIENT)
Dept: FAMILY MEDICINE | Facility: CLINIC | Age: 77
End: 2017-05-15

## 2017-05-15 NOTE — TELEPHONE ENCOUNTER
I wonder if PCP would review and advise before we call.   Statin or aspirin indicated?   Ramsey Robertson RN

## 2017-05-15 NOTE — TELEPHONE ENCOUNTER
I sent Franko a letter (I think I've seen him once) asking that he get a new echo and see Cardiology.  We'll see if there are [facility] records we don't have.

## 2017-05-15 NOTE — TELEPHONE ENCOUNTER
Thanks Dr. Humphreys.   We called patient about [facility] records. And Dr. Humphreys's comment.  Pt states he is not aware of history of MI.   We scheduled face-to-face visit with PCP (pt reported Dr. Humphreys is PCP) one week from today to discuss history and possible refer to cardiology.   Ramsey Robertson RN

## 2017-05-15 NOTE — LETTER
[facility]  [address] [place], [address]  [phone]        May 15, 2017    Emory Clark                                                                                                                                                       [phone]  [phone]            Dear Emory,    Sorry that you had bronchial issues again.   The question came up as to whether you have seen the heart specialist at [facility] or up here.   It would be good to have an echocardiogram done and see the Cardiologist in consultation since the lung and blood issues are hard on the heart also and your 2014 echo while you were ill in hospital was not normal. We should establish where it's at now.    Let me know what you think : I'd like to have you see the Cardiologists here in [place].         Fabrice Boland MD

## 2017-05-22 ENCOUNTER — OFFICE VISIT (OUTPATIENT)
Dept: FAMILY MEDICINE | Facility: CLINIC | Age: 77
End: 2017-05-22
Payer: COMMERCIAL

## 2017-05-22 VITALS
WEIGHT: 224.6 LBS | TEMPERATURE: 98 F | HEIGHT: 72 IN | SYSTOLIC BLOOD PRESSURE: 132 MMHG | OXYGEN SATURATION: 97 % | HEART RATE: 55 BPM | RESPIRATION RATE: 14 BRPM | BODY MASS INDEX: 30.42 KG/M2 | DIASTOLIC BLOOD PRESSURE: 73 MMHG

## 2017-05-22 DIAGNOSIS — Z92.21 STATUS POST CHEMOTHERAPY: Primary | ICD-10-CM

## 2017-05-22 DIAGNOSIS — R94.30 EJECTION FRACTION < 50%: ICD-10-CM

## 2017-05-22 PROCEDURE — 99214 OFFICE O/P EST MOD 30 MIN: CPT | Performed by: FAMILY MEDICINE

## 2017-05-22 NOTE — NURSING NOTE
Chief Complaint   Patient presents with     cardiology referral     upper respiratory symptoms still present       Initial /73 (BP Location: Left arm, Patient Position: Chair, Cuff Size: Adult Large)  Pulse 55  Temp 98  F (36.7  C) (Oral)  Resp 14  Ht 6' (1.829 m)  Wt 224 lb 9.6 oz (101.9 kg)  SpO2 97%  BMI 30.46 kg/m2 Estimated body mass index is 30.46 kg/(m^2) as calculated from the following:    Height as of this encounter: 6' (1.829 m).    Weight as of this encounter: 224 lb 9.6 oz (101.9 kg).  Medication Reconciliation: complete   Jaxson Trevino CMA

## 2017-05-22 NOTE — MR AVS SNAPSHOT
"              After Visit Summary   5/22/2017    Emory Clark    MRN: [id number]           Patient Information     Date Of Birth          1940        Visit Information        Provider Department      5/22/2017 3:15 PM Fabrice Humphreys MD [facility]        Today's Diagnoses     Status post chemotherapy    -  1    Ejection fraction < 50%           Follow-ups after your visit        Future tests that were ordered for you today     Open Future Orders        Priority Expected Expires Ordered    Echocardiogram Complete Routine  5/22/2018 5/22/2017            Who to contact     If you have questions or need follow up information about today's clinic visit or your schedule please contact [facility] directly at [phone].  Normal or non-critical lab and imaging results will be communicated to you by MyChart, letter or phone within 4 business days after the clinic has received the results. If you do not hear from us within 7 days, please contact the clinic through MyChart or phone. If you have a critical or abnormal lab result, we will notify you by phone as soon as possible.  Submit refill requests through [employer] or call your pharmacy and they will forward the refill request to us. Please allow 3 business days for your refill to be completed.          Additional Information About Your Visit        MyChart Information     Liquid lets you send messages to your doctor, view your test results, renew your prescriptions, schedule appointments and more. To sign up, go to [email or web address] . Click on \"Log in\" on the left side of the screen, which will take you to the Welcome page. Then click on \"Sign up Now\" on the right side of the page.     You will be asked to enter the access code listed below, as well as some personal information. Please follow the directions to create your username and password.     Your access code is: [email or web address]  Expires: 6/2/2017  7:12 PM   "   Your access code will  in 90 days. If you need help or a new code, please call your [facility] clinic or [phone].        Care EveryWhere ID     This is your Care EveryWhere ID. This could be used by other organizations to access your [facility] medical records  [id number]        Your Vitals Were     Pulse Temperature Respirations Height Pulse Oximetry BMI (Body Mass Index)    55 98  F (36.7  C) (Oral) 14 6' (1.829 m) 97% 30.46 kg/m2       Blood Pressure from Last 3 Encounters:   17 132/73   17 142/75   17 154/86    Weight from Last 3 Encounters:   17 224 lb 9.6 oz (101.9 kg)   17 223 lb (101.2 kg)   17 222 lb 9.6 oz (101 kg)               Primary Care Provider Office Phone # Fax #    Fabrice Darryl Humphreys [phone]       [address] [address]        Thank you!     Thank you for choosing [facility]  for your care. Our goal is always to provide you with excellent care. Hearing back from our patients is one way we can continue to improve our services. Please take a few minutes to complete the written survey that you may receive in the mail after your visit with us. Thank you!             Your Updated Medication List - Protect others around you: Learn how to safely use, store and throw away your medicines at www.disposemymeds.org.          This list is accurate as of: 17  3:41 PM.  Always use your most recent med list.                   Brand Name Dispense Instructions for use    CALCIUM 600+D3 600-400 MG-UNIT per tablet   Generic drug:  calcium-vitamin D      Take 1 tablet by mouth daily       IMBRUVICA 140 MG capsule   Generic drug:  ibrutinib      Take by mouth daily       MULTIVITAMINS PO      Take 1 tablet by mouth daily       traZODone 50 MG tablet    DESYREL    135 tablet    Take one or two at bedtime as needed for sleep       VITAMIN B COMPLEX PO      Take 1 tablet by mouth  daily

## 2017-05-22 NOTE — PROGRESS NOTES
SUBJECTIVE:                                                    Emory Clark is a 76 year old male who presents to clinic today for the following health issues:      Patient presents today with some upper respiratory symptoms still present, but states that it is improving.  Not sure why he is being referred to a Cardiologist, thought it was just some bronchitis issues.  SUBJECTIVE:    CC: Emory Clark is a 76 year old male who presents for chronic CLL with treatment at [place]    HPI: had reduced EF on echo three years ago, needs follow up No chest pain. 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        PROBLEM LIST:                   Patient Active Problem List   Diagnosis     Chronic lymphocytic leukemia (H)     Elevated PSA     Insomnia     GERD (gastroesophageal reflux disease)     CARDIOVASCULAR SCREENING; LDL GOAL LESS THAN 160     Acute renal failure (H)     Pneumonia     Abnormal EKG     Elevated brain natriuretic peptide (BNP) level     URI (upper respiratory infection)     Health Care Home     Pleural effusion     NSTEMI (non-ST elevated myocardial infarction) (H)     Cardiomyopathy (H)     Anemia associated with chemotherapy     Immunosuppression (H)     Mild persistent asthma       PAST MEDICAL HISTORY:                  Past Medical History:   Diagnosis Date     Basal cell carcinoma of face      CARDIOVASCULAR SCREENING; LDL GOAL LESS THAN 160 1/8/2014     CLL (chronic lymphocytic leukaemia)      Elevated PSA      GERD (gastroesophageal reflux disease)      Insomnia      Pleural effusion 7/10/2014       PAST SURGICAL HISTORY:                  Past Surgical History:   Procedure Laterality Date     basal cell excision       CHOLECYSTECTOMY       LAPAROSCOPIC APPENDECTOMY         CURRENT MEDICATIONS:                  Current Outpatient Prescriptions   Medication Sig Dispense Refill     ibrutinib (IMBRUVICA) 140 MG capsule Take by mouth daily       traZODone (DESYREL) 50 MG  tablet Take one or two at bedtime as needed for sleep 135 tablet 1     Multiple Vitamin (MULTIVITAMINS PO) Take 1 tablet by mouth daily        B Complex Vitamins (VITAMIN B COMPLEX PO) Take 1 tablet by mouth daily        calcium-vitamin D (CALCIUM 600+D3) 600-400 MG-UNIT per tablet Take 1 tablet by mouth daily               FAMILY HISTORY:                   Family History   Problem Relation Age of Onset     Neurologic Disorder Mother      Parkinsons     Other Cancer Father      CLL       HEALTH MAINTENANCE:                    REVIEW OF OUTSIDE RECORDS: NO    REVIEW OF SYSTEMS:  C: NEGATIVE for fever, chills  E: NEGATIVE for vision changes   R: NEGATIVE for significant cough or SOB  CV: NEGATIVE for chest pain, palpitations   GI: NEGATIVE for nausea, abdominal pain, heartburn, or change in bowel habits  : NEGATIVE for frequency, dysuria, or hematuria  M: NEGATIVE for significant arthralgias or myalgia  N: NEGATIVE for weakness, dizziness or paresthesias or headache  NVS:no headache or balance issus  INTEG:no moles or new rashes  LYMPH:no nodes or night sweats    EXAM:    /73 (BP Location: Left arm, Patient Position: Chair, Cuff Size: Adult Large)  Pulse 55  Temp 98  F (36.7  C) (Oral)  Resp 14  Ht 6' (1.829 m)  Wt 224 lb 9.6 oz (101.9 kg)  SpO2 97%  BMI 30.46 kg/m2  GENERAL APPEARANCE: healthy, alert and no distress   EXAM:  GENERAL APPEARANCE: healthy, alert and no distress  EYES: EOMI,  PERRL  HENT: ear canals and TM's normal and nose and mouth without ulcers or lesions  RESP: lungs clear to auscultation - no rales, rhonchi or wheezes  CV: regular rates and rhythm, normal S1 S2, no S3 or S4 and no murmur, click or rub -  ABDOMEN:  soft, nontender, no HSM or masses and bowel sounds normal  MS: extremities normal- no gross deformities noted, no evidence of inflammation in joints, FROM in all extremities.  SKIN: no suspicious lesions or rashes  BACK:no tenderness or pain on straight let raise          He's had [facility] ct to screen his aorta as part of his Oncology Care.  ASSESSMENT/PLAN  1. Status post chemotherapy    2. Ejection fraction < 50%      Discussed records from [facility] and to them                             I have discussed with patient the risks, benefits, medications, treatment options and modalities.   I have instructed the patient to call or schedule a follow-up appointment if any problems or failure to improve.

## 2017-05-23 ASSESSMENT — PATIENT HEALTH QUESTIONNAIRE - PHQ9: SUM OF ALL RESPONSES TO PHQ QUESTIONS 1-9: 2

## 2017-05-23 ASSESSMENT — ASTHMA QUESTIONNAIRES: ACT_TOTALSCORE: 24

## 2017-06-07 ENCOUNTER — HOSPITAL ENCOUNTER (OUTPATIENT)
Dept: CARDIOLOGY | Facility: CLINIC | Age: 77
Discharge: HOME | End: 2017-06-07
Attending: FAMILY MEDICINE | Admitting: FAMILY MEDICINE
Payer: COMMERCIAL

## 2017-06-07 DIAGNOSIS — Z92.21 STATUS POST CHEMOTHERAPY: ICD-10-CM

## 2017-06-07 DIAGNOSIS — R94.30 EJECTION FRACTION < 50%: ICD-10-CM

## 2017-06-07 PROCEDURE — 93306 TTE W/DOPPLER COMPLETE: CPT

## 2017-06-07 PROCEDURE — 93306 TTE W/DOPPLER COMPLETE: CPT | Mod: 26 | Performed by: INTERNAL MEDICINE

## 2017-08-10 ENCOUNTER — TRANSFERRED RECORDS (OUTPATIENT)
Dept: HEALTH INFORMATION MANAGEMENT | Facility: CLINIC | Age: 77
End: 2017-08-10

## 2017-08-10 LAB
AST SERPL-CCNC: 23 U/L (ref 8–48)
CREAT SERPL-MCNC: 0.9 MG/DL (ref 0.8–1.3)

## 2017-09-21 ENCOUNTER — DOCUMENTATION ONLY (OUTPATIENT)
Dept: OTHER | Facility: CLINIC | Age: 77
End: 2017-09-21

## 2017-09-21 PROBLEM — Z71.89 ADVANCED DIRECTIVES, COUNSELING/DISCUSSION: Chronic | Status: ACTIVE | Noted: 2017-09-21

## 2018-01-09 ENCOUNTER — OFFICE VISIT (OUTPATIENT)
Dept: FAMILY MEDICINE | Facility: CLINIC | Age: 78
End: 2018-01-09
Payer: COMMERCIAL

## 2018-01-09 VITALS
TEMPERATURE: 97.5 F | BODY MASS INDEX: 29.27 KG/M2 | HEART RATE: 55 BPM | RESPIRATION RATE: 14 BRPM | OXYGEN SATURATION: 96 % | DIASTOLIC BLOOD PRESSURE: 68 MMHG | HEIGHT: 72 IN | WEIGHT: 216.1 LBS | SYSTOLIC BLOOD PRESSURE: 116 MMHG

## 2018-01-09 DIAGNOSIS — C91.10 CHRONIC LYMPHOCYTIC LEUKEMIA (H): ICD-10-CM

## 2018-01-09 DIAGNOSIS — I21.4 NSTEMI (NON-ST ELEVATED MYOCARDIAL INFARCTION) (H): ICD-10-CM

## 2018-01-09 DIAGNOSIS — Z00.00 WELLNESS EXAMINATION: Primary | ICD-10-CM

## 2018-01-09 DIAGNOSIS — Z87.448 HISTORY OF ACUTE RENAL FAILURE: ICD-10-CM

## 2018-01-09 PROCEDURE — 80053 COMPREHEN METABOLIC PANEL: CPT | Performed by: FAMILY MEDICINE

## 2018-01-09 PROCEDURE — 99397 PER PM REEVAL EST PAT 65+ YR: CPT | Performed by: FAMILY MEDICINE

## 2018-01-09 PROCEDURE — 80061 LIPID PANEL: CPT | Performed by: FAMILY MEDICINE

## 2018-01-09 PROCEDURE — 36415 COLL VENOUS BLD VENIPUNCTURE: CPT | Performed by: FAMILY MEDICINE

## 2018-01-09 NOTE — MR AVS SNAPSHOT
After Visit Summary   1/9/2018    Emory Clark    MRN: [id number]           Patient Information     Date Of Birth          1940        Visit Information        Provider Department      1/9/2018 9:15 AM Fabrice Humphreys MD [facility]        Today's Diagnoses     Wellness examination    -  1    Chronic lymphocytic leukemia (H)        History of acute renal failure        NSTEMI (non-ST elevated myocardial infarction) (H)          Care Instructions      Preventive Health Recommendations:   Male Ages 65 and over    Yearly exam:             See your health care provider every year in order to  o   Review health changes.   o   Discuss preventive care.    o   Review your medicines if your doctor has prescribed any.    Talk with your health care provider about whether you should have a test to screen for prostate cancer (PSA).    Every 3 years, have a diabetes test (fasting glucose). If you are at risk for diabetes, you should have this test more often.    Every 5 years, have a cholesterol test. Have this test more often if you are at risk for high cholesterol or heart disease.     Every 10 years, have a colonoscopy. Or, have a yearly FIT test (stool test). These exams will check for colon cancer.    Talk to with your health care provider about screening for Abdominal Aortic Aneurysm if you have a family history of AAA or have a history of smoking.    Shots:     Get a flu shot each year.     Get a tetanus shot every 10 years.     Talk to your doctor about your pneumonia vaccines. There are now two you should receive - Pneumovax (PPSV 23) and Prevnar (PCV 13).     Talk to your doctor about a shingles vaccine.     Talk to your doctor about the hepatitis B vaccine.  Nutrition:     Eat at least 5 servings of fruits and vegetables each day.     Eat whole-grain bread, whole-wheat pasta and brown rice instead of white grains and rice.     Talk to your provider about Calcium and Vitamin  "D.   Lifestyle    Exercise for at least 150 minutes a week (30 minutes a day, 5 days a week). This will help you control your weight and prevent disease.     Limit alcohol to one drink per day.     No smoking.     Wear sunscreen to prevent skin cancer.     See your dentist every six months for an exam and cleaning.     See your eye doctor every 1 to 2 years to screen for conditions such as glaucoma, macular degeneration, cataracts, etc           Follow-ups after your visit        Who to contact     If you have questions or need follow up information about today's clinic visit or your schedule please contact [facility] directly at [phone].  Normal or non-critical lab and imaging results will be communicated to you by MyChart, letter or phone within 4 business days after the clinic has received the results. If you do not hear from us within 7 days, please contact the clinic through [email or web address] or phone. If you have a critical or abnormal lab result, we will notify you by phone as soon as possible.  Submit refill requests through [employer] or call your pharmacy and they will forward the refill request to us. Please allow 3 business days for your refill to be completed.          Additional Information About Your Visit        [email or web address] Information     [employer] lets you send messages to your doctor, view your test results, renew your prescriptions, schedule appointments and more. To sign up, go to [email or web address] . Click on \"Log in\" on the left side of the screen, which will take you to the Welcome page. Then click on \"Sign up Now\" on the right side of the page.     You will be asked to enter the access code listed below, as well as some personal information. Please follow the directions to create your username and password.     Your access code is: [email or web address]  Expires: 2018  9:51 AM     Your access code will  in 90 days. If you need help or a new code, please call your [place] clinic " or [phone].        Care EveryWhere ID     This is your Care EveryWhere ID. This could be used by other organizations to access your [facility] medical records  [id number]        Your Vitals Were     Pulse Temperature Respirations Height Pulse Oximetry BMI (Body Mass Index)    55 97.5  F (36.4  C) (Oral) 14 6' (1.829 m) 96% 29.31 kg/m2       Blood Pressure from Last 3 Encounters:   01/09/18 116/68   05/22/17 132/73   05/12/17 142/75    Weight from Last 3 Encounters:   01/09/18 216 lb 1.6 oz (98 kg)   05/22/17 224 lb 9.6 oz (101.9 kg)   05/12/17 223 lb (101.2 kg)              We Performed the Following     Comprehensive metabolic panel     Lipid panel reflex to direct LDL Fasting        Primary Care Provider Office Phone # Fax #    Fabrice Humphreys [phone] [address] [facility] [address]        Equal Access to Services     [facility] Hospital[facility]ERIDAN : Hadii aad ku hadasho Soomaali, waaxda luqadaha, qaybta kaalmada adeegyada, waxay idiin haymarissa crocker . So [facility] [phone].    ATENCIÓN: Si habla español, tiene a campuzano disposición servicios gratuitos de asistencia lingüística. Llame al [phone].    We comply with applicable federal civil rights laws and [place] laws. We do not discriminate on the basis of race, color, national origin, age, disability, sex, sexual orientation, or gender identity.            Thank you!     Thank you for choosing [facility]  for your care. Our goal is always to provide you with excellent care. Hearing back from our patients is one way we can continue to improve our services. Please take a few minutes to complete the written survey that you may receive in the mail after your visit with us. Thank you!             Your Updated Medication List - Protect others around you: Learn how to safely use, store and throw away your medicines at www.disposemymeds.org.          This list is accurate as of: 1/9/18  9:55 AM.  Always use  your most recent med list.                   Brand Name Dispense Instructions for use Diagnosis    CALCIUM 600+D3 600-400 MG-UNIT per tablet   Generic drug:  calcium-vitamin D      Take 1 tablet by mouth daily        IMBRUVICA 140 MG capsule   Generic drug:  ibrutinib      Take by mouth daily        MULTIVITAMINS PO      Take 1 tablet by mouth daily        traZODone 50 MG tablet    DESYREL    135 tablet    Take one or two at bedtime as needed for sleep    Pleural effusion       VITAMIN B COMPLEX PO      Take 1 tablet by mouth daily

## 2018-01-09 NOTE — LETTER
January 12, 2018      Emory Clark  [address] [address] [facility] [address]        Dear ,    We are writing to inform you of your test results.    Your blood is still good. Keep up the good diet and weight habits and enjoy the rest of the winter.    Resulted Orders   Lipid panel reflex to direct LDL Fasting   Result Value Ref Range    Cholesterol 131 <200 mg/dL    Triglycerides 128 <150 mg/dL      Comment:      Fasting specimen    HDL Cholesterol 37 (L) >39 mg/dL    LDL Cholesterol Calculated 68 <100 mg/dL      Comment:      Desirable:       <100 mg/dl    Non HDL Cholesterol 94 <130 mg/dL   Comprehensive metabolic panel   Result Value Ref Range    Sodium 140 133 - 144 mmol/L    Potassium 4.6 3.4 - 5.3 mmol/L    Chloride 105 94 - 109 mmol/L    Carbon Dioxide 28 20 - 32 mmol/L    Anion Gap 7 3 - 14 mmol/L    Glucose 85 70 - 99 mg/dL      Comment:      Fasting specimen    Urea Nitrogen 20 7 - 30 mg/dL    Creatinine 0.86 0.66 - 1.25 mg/dL    GFR Estimate 87 >60 mL/min/1.7m2      Comment:      Non  GFR Calc    GFR Estimate If Black >90 >60 mL/min/1.7m2      Comment:       GFR Calc    Calcium 8.6 8.5 - 10.1 mg/dL    Bilirubin Total 0.7 0.2 - 1.3 mg/dL    Albumin 4.0 3.4 - 5.0 g/dL    Protein Total 6.5 (L) 6.8 - 8.8 g/dL    Alkaline Phosphatase 72 40 - 150 U/L    ALT 31 0 - 70 U/L    AST 19 0 - 45 U/L       If you have any questions or concerns, please call the clinic at the number listed above.       Sincerely,        Fabrice Humphreys MD

## 2018-01-09 NOTE — PROGRESS NOTES
SUBJECTIVE:   Emory Clark is a 77 year old male who presents for Preventive Visit.    He feels his labile at work   Are you in the first 12 months of your Medicare Part B coverage?  No    Healthy Habits:    Do you get at least three servings of calcium containing foods daily (dairy, green leafy vegetables, etc.)? yes    Amount of exercise or daily activities, outside of work: walking everyday    Problems taking medications regularly No    Medication side effects: No    Have you had an eye exam in the past two years? yes    Do you see a dentist twice per year? yes    Do you have sleep apnea, excessive snoring or daytime drowsiness?no      Ability to successfully perform activities of daily living: Yes, no assistance needed    Home safety:  none identified     Hearing impairment: No    Fall risk:             COGNITIVE SCREEN  1) Repeat 3 items (Banana, Sunrise, Chair)    2) Clock draw: NORMAL  3) 3 item recall:   Recalls 3 objects  Results: 3 items recalled: COGNITIVE IMPAIRMENT LESS LIKELY    Mini-CogTM Copyright MCKENZIE Vieyra. Licensed by the author for use in [facility]; reprinted with permission (lorena@[email or web address]). All rights reserved.                    Reviewed and updated as needed this visit by clinical staff         Reviewed and updated as needed this visit by Provider      Social History   Substance Use Topics     Smoking status: Never Smoker     Smokeless tobacco: Never Used     Alcohol use 0.0 oz/week     0 Standard drinks or equivalent per week      Comment: rare       If you drink alcohol do you typically have >3 drinks per day or >7 drinks per week? Yes - AUDIT SCORE:                               Today's PHQ-2 Score: PHQ-2 ( 1999 Pfizer) 5/22/2017 5/12/2017   Q1: Little interest or pleasure in doing things 0 0   Q2: Feeling down, depressed or hopeless 0 0   PHQ-2 Score 0 0         Do you feel safe in your environment - Yes    Do you have a Health Care Directive?: No: Advance care planning  reviewed with patient; information given to patient to review.      Current providers sharing in care for this patient include: Patient Care Team:  Fabrice Humphreys MD as PCP - General (Family Practice)    The following health maintenance items are reviewed in Epic and correct as of today:  Health Maintenance   Topic Date Due     HF ACTION PLAN Q3 YR  1940     LIPID MONITORING Q1 YEAR  09/05/1941     AORTIC ANEURYSM SCREENING (SYSTEM ASSIGNED)  09/05/2007     BMP Q6 MOS  01/07/2016     CBC Q1 YR  10/07/2016     ALT Q1 YR  11/18/2016     INFLUENZA VACCINE (SYSTEM ASSIGNED)  09/01/2017     ASTHMA CONTROL TEST Q6 MOS  11/23/2017     FALL RISK ASSESSMENT  01/31/2018     ASTHMA ACTION PLAN Q1 YR  05/15/2018     COLON CANCER SCREEN (SYSTEM ASSIGNED)  04/01/2021     ADVANCE DIRECTIVE PLANNING Q5 YRS  09/21/2022     TETANUS IMMUNIZATION (SYSTEM ASSIGNED)  09/27/2023     PNEUMOCOCCAL  Completed     BP Readings from Last 3 Encounters:   01/09/18 116/68   05/22/17 132/73   05/12/17 142/75    Wt Readings from Last 3 Encounters:   01/09/18 216 lb 1.6 oz (98 kg)   05/22/17 224 lb 9.6 oz (101.9 kg)   05/12/17 223 lb (101.2 kg)                      Pneumonia Vaccine:Adults age 65+ who received Pneumovax (PPSV23) at 65 years or older: Should be given PCV13 > 1 year after their most recent PPSV23    ROS:  Constitutional, HEENT, cardiovascular, pulmonary, GI, , musculoskeletal, neuro, skin, endocrine and psych systems are negative, except as otherwise noted.  /68 (BP Location: Right arm, Patient Position: Chair, Cuff Size: Adult Large)  Pulse 55  Temp 97.5  F (36.4  C) (Oral)  Resp 14  Ht 6' (1.829 m)  Wt 216 lb 1.6 oz (98 kg)  SpO2 96%  BMI 29.31 kg/m2      OBJECTIVE:   There were no vitals taken for this visit. Estimated body mass index is 30.46 kg/(m^2) as calculated from the following:    Height as of 5/22/17: 6' (1.829 m).    Weight as of 5/22/17: 224 lb 9.6 oz (101.9 kg).  EXAM:   GENERAL: healthy,  alert and no distress  EYES: Eyes grossly normal to inspection, PERRL and conjunctivae and sclerae normal  HENT: ear canals and TM's normal, nose and mouth without ulcers or lesions  NECK: no adenopathy, no asymmetry, masses, or scars and thyroid normal to palpation  RESP: lungs clear to auscultation - no rales, rhonchi or wheezes  CV: regular rate and rhythm, normal S1 S2, no S3 or S4, no murmur, click or rub, no peripheral edema and peripheral pulses strong  ABDOMEN: soft, nontender, no hepatosplenomegaly, no masses and bowel sounds normal   (male): normal male genitalia without lesions or urethral discharge, no hernia  MS: no gross musculoskeletal defects noted, no edema  SKIN: no suspicious lesions or rashes  NEURO: Normal strength and tone, mentation intact and speech normal  PSYCH: mentation appears normal, affect normal/bright    ASSESSMENT / PLAN:   There are no diagnoses linked to this encounter.    End of Life Planning:  Patient currently has an advanced directive: Yes.  Practitioner is supportive of decision.    COUNSELING:  Reviewed preventive health counseling, as reflected in patient instructions       Regular exercise       Healthy diet/nutrition       Vision screening       Hearing screening  (Z00.00) Wellness examination  (primary encounter diagnosis)  Comment:   Plan:     (C91.10) Chronic lymphocytic leukemia (H)  Comment:   Plan: follows at [place]    (Z87.448) History of acute renal failure  Comment:   Plan: during his chemo days    (I21.4) NSTEMI (non-ST elevated myocardial infarction) (H)  Comment:   Plan: no chest pain now, feels strong and stays busy      Estimated body mass index is 30.46 kg/(m^2) as calculated from the following:    Height as of 5/22/17: 6' (1.829 m).    Weight as of 5/22/17: 224 lb 9.6 oz (101.9 kg).       reports that he has never smoked. He has never used smokeless tobacco.        Appropriate preventive services were discussed with this patient, including applicable  screening as appropriate for cardiovascular disease, diabetes, osteopenia/osteoporosis, and glaucoma.  As appropriate for age/gender, discussed screening for colorectal cancer, prostate cancer, breast cancer, and cervical cancer. Checklist reviewing preventive services available has been given to the patient.    Reviewed patients plan of care and provided an AVS. The Basic Care Plan (routine screening as documented in Health Maintenance) for Emory meets the Care Plan requirement. This Care Plan has been established and reviewed with the Patient.    Counseling Resources:  ATP IV Guidelines  Pooled Cohorts Equation Calculator  Breast Cancer Risk Calculator  FRAX Risk Assessment  ICSI Preventive Guidelines  Dietary Guidelines for Americans, 2010  USDA's MyPlate  ASA Prophylaxis  Lung CA Screening    Fabrice Humphreys MD  [facility]

## 2018-01-10 LAB
ALBUMIN SERPL-MCNC: 4 G/DL (ref 3.4–5)
ALP SERPL-CCNC: 72 U/L (ref 40–150)
ALT SERPL W P-5'-P-CCNC: 31 U/L (ref 0–70)
ANION GAP SERPL CALCULATED.3IONS-SCNC: 7 MMOL/L (ref 3–14)
AST SERPL W P-5'-P-CCNC: 19 U/L (ref 0–45)
BILIRUB SERPL-MCNC: 0.7 MG/DL (ref 0.2–1.3)
BUN SERPL-MCNC: 20 MG/DL (ref 7–30)
CALCIUM SERPL-MCNC: 8.6 MG/DL (ref 8.5–10.1)
CHLORIDE SERPL-SCNC: 105 MMOL/L (ref 94–109)
CHOLEST SERPL-MCNC: 131 MG/DL
CO2 SERPL-SCNC: 28 MMOL/L (ref 20–32)
CREAT SERPL-MCNC: 0.86 MG/DL (ref 0.66–1.25)
GFR SERPL CREATININE-BSD FRML MDRD: 87 ML/MIN/1.7M2
GLUCOSE SERPL-MCNC: 85 MG/DL (ref 70–99)
HDLC SERPL-MCNC: 37 MG/DL
LDLC SERPL CALC-MCNC: 68 MG/DL
NONHDLC SERPL-MCNC: 94 MG/DL
POTASSIUM SERPL-SCNC: 4.6 MMOL/L (ref 3.4–5.3)
PROT SERPL-MCNC: 6.5 G/DL (ref 6.8–8.8)
SODIUM SERPL-SCNC: 140 MMOL/L (ref 133–144)
TRIGL SERPL-MCNC: 128 MG/DL

## 2018-01-10 ASSESSMENT — ASTHMA QUESTIONNAIRES: ACT_TOTALSCORE: 25

## 2018-01-17 ENCOUNTER — TELEPHONE (OUTPATIENT)
Dept: FAMILY MEDICINE | Facility: CLINIC | Age: 78
End: 2018-01-17

## 2018-01-17 DIAGNOSIS — E78.5 HYPERLIPIDEMIA LDL GOAL <100: Primary | ICD-10-CM

## 2018-01-17 DIAGNOSIS — C91.10 CHRONIC LYMPHOCYTIC LEUKEMIA (H): ICD-10-CM

## 2018-01-17 NOTE — TELEPHONE ENCOUNTER
METRIC FAILED: DM and CV   RECOMMENDATIONS: update ASA, epic informs pt stopped ASA 81 mg in 2015, route to inform pt if needed    Alie Wick RN, BSN  Message handled by Nurse Triage.

## 2018-02-07 ENCOUNTER — OFFICE VISIT (OUTPATIENT)
Dept: FAMILY MEDICINE | Facility: CLINIC | Age: 78
End: 2018-02-07
Payer: COMMERCIAL

## 2018-02-07 ENCOUNTER — TELEPHONE (OUTPATIENT)
Dept: FAMILY MEDICINE | Facility: CLINIC | Age: 78
End: 2018-02-07

## 2018-02-07 VITALS
BODY MASS INDEX: 29.73 KG/M2 | TEMPERATURE: 97.7 F | DIASTOLIC BLOOD PRESSURE: 74 MMHG | RESPIRATION RATE: 12 BRPM | WEIGHT: 219.2 LBS | HEART RATE: 61 BPM | SYSTOLIC BLOOD PRESSURE: 130 MMHG | OXYGEN SATURATION: 96 %

## 2018-02-07 DIAGNOSIS — B00.9 HERPES SIMPLEX VIRUS INFECTION: ICD-10-CM

## 2018-02-07 DIAGNOSIS — B00.9 HERPES SIMPLEX VIRUS INFECTION: Primary | ICD-10-CM

## 2018-02-07 PROCEDURE — 99213 OFFICE O/P EST LOW 20 MIN: CPT | Performed by: FAMILY MEDICINE

## 2018-02-07 RX ORDER — PREDNISONE 20 MG/1
40 TABLET ORAL DAILY
Qty: 10 TABLET | Refills: 0
Start: 2018-02-07 | End: 2018-08-29

## 2018-02-07 RX ORDER — VALACYCLOVIR HYDROCHLORIDE 1 G/1
1000 TABLET, FILM COATED ORAL 3 TIMES DAILY
Qty: 21 TABLET | Refills: 0 | Status: SHIPPED | OUTPATIENT
Start: 2018-02-07 | End: 2018-08-29

## 2018-02-07 RX ORDER — PREDNISONE 20 MG/1
40 TABLET ORAL DAILY
Qty: 20 TABLET | Refills: 0 | Status: SHIPPED | OUTPATIENT
Start: 2018-02-07 | End: 2018-02-07

## 2018-02-07 NOTE — TELEPHONE ENCOUNTER
Pt calls from pharmacy. Still waiting.     Pharmacist informed:     Signed Prescriptions:                        Disp   Refills    predniSONE (DELTASONE) 20 MG tablet        10 tab*0        Sig: Take 2 tablets (40 mg) by mouth daily  Authorizing Provider: FERNANDEZ BENTLEY  Ordering User: IVONNE ROBERTSON Dr., if you intended #20 with different instructions, please advise.   Ivonne Robertson, RN

## 2018-02-07 NOTE — PROGRESS NOTES
SUBJECTIVE:   Emory Clark is a 77 year old male who presents to clinic today for the following health issues:    SUBJECTIVE:    CC: Emory Clark is a 77 year old male who presents for  A diffuse pain over his left scalp and lesser in his left face with no numbness , tingling or factial droop    HPI: he wonders if he has a bad sinus or a bad tooth but the distribution suggests neuralgia  Feels fine, no presyncope, swallowing issues or weakness     Discussed pre eruptive zoster , he doesn't have tmj tenderness or tinnel's sign  Past Medical History:   Diagnosis Date     Basal cell carcinoma of face      CARDIOVASCULAR SCREENING; LDL GOAL LESS THAN 160 1/8/2014     CLL (chronic lymphocytic leukaemia)      Elevated PSA      GERD (gastroesophageal reflux disease)      Insomnia      Pleural effusion 7/10/2014       Past Surgical History:   Procedure Laterality Date     basal cell excision       CHOLECYSTECTOMY       LAPAROSCOPIC APPENDECTOMY         Family History   Problem Relation Age of Onset     Neurologic Disorder Mother      Parkinsons     Other Cancer Father      CLL       Social History   Substance Use Topics     Smoking status: Never Smoker     Smokeless tobacco: Never Used     Alcohol use 0.0 oz/week     0 Standard drinks or equivalent per week      Comment: rare             PROBLEM LIST:                   Patient Active Problem List   Diagnosis     Chronic lymphocytic leukemia (H)     Insomnia     GERD (gastroesophageal reflux disease)     CARDIOVASCULAR SCREENING; LDL GOAL LESS THAN 160     Pneumonia     Abnormal EKG     Elevated brain natriuretic peptide (BNP) level     Health Care Home     Pleural effusion     NSTEMI (non-ST elevated myocardial infarction) (H)     Cardiomyopathy (H)     Anemia associated with chemotherapy     Immunosuppression (H)     Mild persistent asthma     Advance Care Planning     History of acute renal failure       PAST MEDICAL HISTORY:                  Past Medical History:    Diagnosis Date     Basal cell carcinoma of face      CARDIOVASCULAR SCREENING; LDL GOAL LESS THAN 160 1/8/2014     CLL (chronic lymphocytic leukaemia)      Elevated PSA      GERD (gastroesophageal reflux disease)      Insomnia      Pleural effusion 7/10/2014       PAST SURGICAL HISTORY:                  Past Surgical History:   Procedure Laterality Date     basal cell excision       CHOLECYSTECTOMY       LAPAROSCOPIC APPENDECTOMY         CURRENT MEDICATIONS:                  Current Outpatient Prescriptions   Medication Sig Dispense Refill     ASPIRIN NOT PRESCRIBED (INTENTIONAL) Please choose reason not prescribed, below 0 each 0     ibrutinib (IMBRUVICA) 140 MG capsule Take by mouth daily       traZODone (DESYREL) 50 MG tablet Take one or two at bedtime as needed for sleep 135 tablet 1     Multiple Vitamin (MULTIVITAMINS PO) Take 1 tablet by mouth daily        B Complex Vitamins (VITAMIN B COMPLEX PO) Take 1 tablet by mouth daily        calcium-vitamin D (CALCIUM 600+D3) 600-400 MG-UNIT per tablet Take 1 tablet by mouth daily               FAMILY HISTORY:                   Family History   Problem Relation Age of Onset     Neurologic Disorder Mother      Parkinsons     Other Cancer Father      CLL       HEALTH MAINTENANCE:                    REVIEW OF OUTSIDE RECORDS: NO    REVIEW OF SYSTEMS:  C: NEGATIVE for fever, chills  E: NEGATIVE for vision changes   R: NEGATIVE for significant cough or SOBCV: NEGATIVE for chest pain, palpitations   GI: NEGATIVE for nausea, abdominal pain, heartburn, or change in bowel habits  : NEGATIVE for frequency, dysuria, or hematuria  M: NEGATIVE for significant arthralgias or myalgia  N: NEGATIVE for weakness, dizziness or paresthesias or headache  NVS:no headache or balance issus  INTEG:no moles or new rashes  LYMPH:no nodes or night sweats    EXAM:    /74 (BP Location: Right arm, Patient Position: Chair, Cuff Size: Adult Large)  Pulse 61  Temp 97.7  F (36.5  C)  (Tympanic)  Resp 12  Wt 219 lb 3.2 oz (99.4 kg)  SpO2 96%  BMI 29.73 kg/m2  GENERAL APPEARANCE: healthy, alert and no distress   EXAM:  GENERAL APPEARANCE: healthy, alert and no distress  EYES: EOMI,  PERRL  HENT: ear canals and TM's normal and nose and mouth without ulcers or lesions  RESP: lungs clear to auscultation - no rales, rhonchi or wheezes  CV: regular rates and rhythm, normal S1 S2, no S3 or S4 and no murmur, click or rub -  ABDOMEN:  soft, nontender, no HSM or masses and bowel sounds normal  BACK:no tenderness or pain on straight let raise           ASSESSMENT/PLAN    (B00.9) Herpes simplex virus infection  (primary encounter diagnosis)  Comment:   Plan: valACYclovir (VALTREX) 1000 mg tablet,         DISCONTINUED: predniSONE (DELTASONE) 20 MG         tablet          I asked him to call his Oncology about these med choices and their views on his symptoms  His disease has been very well controlled , he's on remmittive agent    Report any progression or if they elect  To see him.                                 I have discussed with patient the risks, benefits, medications, treatment options and modalities.   I have instructed the patient to call or schedule a follow-up appointment if any problems or failure to improve.

## 2018-02-07 NOTE — MR AVS SNAPSHOT
"              After Visit Summary   2018    Emory Clark    MRN: [id number]           Patient Information     Date Of Birth          1940        Visit Information        Provider Department      2018 10:30 AM Fabrice Humphreys MD [facility]        Today's Diagnoses     Herpes simplex virus infection    -  1       Follow-ups after your visit        Who to contact     If you have questions or need follow up information about today's clinic visit or your schedule please contact [facility] directly at [phone].  Normal or non-critical lab and imaging results will be communicated to you by [employer], letter or phone within 4 business days after the clinic has received the results. If you do not hear from us within 7 days, please contact the clinic through [employer] or phone. If you have a critical or abnormal lab result, we will notify you by phone as soon as possible.  Submit refill requests through [employer] or call your pharmacy and they will forward the refill request to us. Please allow 3 business days for your refill to be completed.          Additional Information About Your Visit        MyChart Information     [employer] lets you send messages to your doctor, view your test results, renew your prescriptions, schedule appointments and more. To sign up, go to [email or web address] . Click on \"Log in\" on the left side of the screen, which will take you to the Welcome page. Then click on \"Sign up Now\" on the right side of the page.     You will be asked to enter the access code listed below, as well as some personal information. Please follow the directions to create your username and password.     Your access code is: [email or web address]  Expires: 2018  9:51 AM     Your access code will  in 90 days. If you need help or a new code, please call your [facility] or [phone].        Care EveryWhere ID     This is your Care EveryWhere ID. This could be " used by other organizations to access your [place] medical records  [id number]        Your Vitals Were     Pulse Temperature Respirations Pulse Oximetry BMI (Body Mass Index)       61 97.7  F (36.5  C) (Tympanic) 12 96% 29.73 kg/m2        Blood Pressure from Last 3 Encounters:   02/07/18 130/74   01/09/18 116/68   05/22/17 132/73    Weight from Last 3 Encounters:   02/07/18 219 lb 3.2 oz (99.4 kg)   01/09/18 216 lb 1.6 oz (98 kg)   05/22/17 224 lb 9.6 oz (101.9 kg)              Today, you had the following     No orders found for display         Today's Medication Changes          These changes are accurate as of 2/7/18 10:55 AM.  If you have any questions, ask your nurse or doctor.               Start taking these medicines.        Dose/Directions    predniSONE 20 MG tablet   Commonly known as:  DELTASONE   Used for:  Herpes simplex virus infection   Started by:  Fabrice Humphreys MD        Dose:  40 mg   Take 2 tablets (40 mg) by mouth daily for 5 days   Quantity:  20 tablet   Refills:  0       valACYclovir 1000 mg tablet   Commonly known as:  VALTREX   Used for:  Herpes simplex virus infection   Started by:  Fabrice Humphreys MD        Dose:  1000 mg   Take 1 tablet (1,000 mg) by mouth 3 times daily   Quantity:  21 tablet   Refills:  0            Where to get your medicines      These medications were sent to [place] [address] [place] [facility] [address] [facility]  [address] [facility] [address]     Phone:  [phone]     predniSONE 20 MG tablet    valACYclovir 1000 mg tablet                Primary Care Provider Office Phone # Fax #    Fabrice Humphreys [phone] [address] [facility] [address]        Equal Access to Services     ANNE MARIE MARTIN : Lexi Ramirez, wajosefa sarkar, qabrianta kaalmada carlos manuel, buzz pierre. So [facility] [phone].    ATENCIÓN: Si rahel esptracy, fidelina a campuzano disposición  servicios gratuitos de asistencia lingüística. Lai timmons [phone].    We comply with applicable federal civil rights laws and [place] laws. We do not discriminate on the basis of race, color, national origin, age, disability, sex, sexual orientation, or gender identity.            Thank you!     Thank you for choosing [facility]  for your care. Our goal is always to provide you with excellent care. Hearing back from our patients is one way we can continue to improve our services. Please take a few minutes to complete the written survey that you may receive in the mail after your visit with us. Thank you!             Your Updated Medication List - Protect others around you: Learn how to safely use, store and throw away your medicines at www.disposemymeds.org.          This list is accurate as of 2/7/18 10:55 AM.  Always use your most recent med list.                   Brand Name Dispense Instructions for use Diagnosis    ASPIRIN NOT PRESCRIBED    INTENTIONAL    0 each    Please choose reason not prescribed, below    Hyperlipidemia LDL goal <100       CALCIUM 600+D3 600-400 MG-UNIT per tablet   Generic drug:  calcium-vitamin D      Take 1 tablet by mouth daily        IMBRUVICA 140 MG capsule   Generic drug:  ibrutinib      Take by mouth daily        MULTIVITAMINS PO      Take 1 tablet by mouth daily        predniSONE 20 MG tablet    DELTASONE    20 tablet    Take 2 tablets (40 mg) by mouth daily for 5 days    Herpes simplex virus infection       traZODone 50 MG tablet    DESYREL    135 tablet    Take one or two at bedtime as needed for sleep    Pleural effusion       valACYclovir 1000 mg tablet    VALTREX    21 tablet    Take 1 tablet (1,000 mg) by mouth 3 times daily    Herpes simplex virus infection       VITAMIN B COMPLEX PO      Take 1 tablet by mouth daily

## 2018-02-07 NOTE — TELEPHONE ENCOUNTER
Disp Refills Start End JORGE LUIS   predniSONE (DELTASONE) 20 MG tablet 20 tablet 0 2/7/2018 2/12/2018 --   Sig: Take 2 tablets (40 mg) by mouth daily for 5 days     [facility] pharmacy calling regarding rx, if taking 2 tabs (40 mg) qd for only 5 days, the quantity would be 10 tabs  Rx written for 20 tabs  Wondering if you need change the quantity of the directions    # [phone]    Route to provider to review and advise    Brenna Cooper RN Nurse Triage

## 2018-05-20 NOTE — NURSING NOTE
Chief Complaint   Patient presents with     Cough     x1 week       Initial /75 (BP Location: Right arm, Patient Position: Chair, Cuff Size: Adult Regular)  Pulse 62  Temp 97.6  F (36.4  C) (Oral)  Resp 16  Ht 6' (1.829 m)  Wt 223 lb (101.2 kg)  SpO2 96%  BMI 30.24 kg/m2 Estimated body mass index is 30.24 kg/(m^2) as calculated from the following:    Height as of this encounter: 6' (1.829 m).    Weight as of this encounter: 223 lb (101.2 kg).  Medication Reconciliation: complete   Sonia Kern, CMA      
home

## 2018-08-29 ENCOUNTER — OFFICE VISIT (OUTPATIENT)
Dept: FAMILY MEDICINE | Facility: CLINIC | Age: 78
End: 2018-08-29
Payer: COMMERCIAL

## 2018-08-29 VITALS
HEIGHT: 72 IN | SYSTOLIC BLOOD PRESSURE: 136 MMHG | HEART RATE: 83 BPM | RESPIRATION RATE: 14 BRPM | TEMPERATURE: 98.1 F | DIASTOLIC BLOOD PRESSURE: 74 MMHG | WEIGHT: 211.3 LBS | BODY MASS INDEX: 28.62 KG/M2 | OXYGEN SATURATION: 94 %

## 2018-08-29 DIAGNOSIS — C91.10 CHRONIC LYMPHOCYTIC LEUKEMIA (H): ICD-10-CM

## 2018-08-29 DIAGNOSIS — B00.9 HERPES SIMPLEX VIRUS INFECTION: Primary | ICD-10-CM

## 2018-08-29 DIAGNOSIS — J30.1 ACUTE SEASONAL ALLERGIC RHINITIS DUE TO POLLEN: ICD-10-CM

## 2018-08-29 DIAGNOSIS — R05.9 COUGH: ICD-10-CM

## 2018-08-29 PROCEDURE — 99214 OFFICE O/P EST MOD 30 MIN: CPT | Performed by: FAMILY MEDICINE

## 2018-08-29 RX ORDER — ACYCLOVIR 400 MG/1
400 TABLET ORAL 2 TIMES DAILY
COMMUNITY
Start: 2018-07-12 | End: 2024-08-04

## 2018-08-29 RX ORDER — AZITHROMYCIN 250 MG/1
TABLET, FILM COATED ORAL
Qty: 6 TABLET | Refills: 0 | Status: SHIPPED | OUTPATIENT
Start: 2018-08-29 | End: 2018-09-07

## 2018-08-29 RX ORDER — CODEINE PHOSPHATE AND GUAIFENESIN 10; 100 MG/5ML; MG/5ML
1 SOLUTION ORAL EVERY 4 HOURS PRN
Qty: 180 ML | Refills: 0 | Status: SHIPPED | OUTPATIENT
Start: 2018-08-29 | End: 2019-08-16

## 2018-08-29 NOTE — LETTER
My Asthma Action Plan  Name: Emory Clark   YOB: 1940  Date: 8/29/2018   My doctor: Fabrice Humphreys MD   My clinic: [facility]        My Control Medicine: None  My Rescue Medicine: none   My Asthma Severity: none  Avoid your asthma triggers: none               GREEN ZONE   Good Control    I feel good    No cough or wheeze    Can work, sleep and play without asthma symptoms       Take your asthma control medicine every day.     1. If exercise triggers your asthma, take your rescue medication    15 minutes before exercise or sports, and    During exercise if you have asthma symptoms  2. Spacer to use with inhaler: If you have a spacer, make sure to use it with your inhaler             YELLOW ZONE Getting Worse  I have ANY of these:    I do not feel good    Cough or wheeze    Chest feels tight    Wake up at night   1. Keep taking your Green Zone medications  2. Start taking your rescue medicine:    every 20 minutes for up to 1 hour. Then every 4 hours for 24-48 hours.  3. If you stay in the Yellow Zone for more than 12-24 hours, contact your doctor.  4. If you do not return to the Green Zone in 12-24 hours or you get worse, start taking your oral steroid medicine if prescribed by your provider.           RED ZONE Medical Alert - Get Help  I have ANY of these:    I feel awful    Medicine is not helping    Breathing getting harder    Trouble walking or talking    Nose opens wide to breathe       1. Take your rescue medicine NOW  2. If your provider has prescribed an oral steroid medicine, start taking it NOW  3. Call your doctor NOW  4. If you are still in the Red Zone after 20 minutes and you have not reached your doctor:    Take your rescue medicine again and    Call 911 or go to the emergency room right away    See your regular doctor within 2 weeks of an Emergency Room or Urgent Care visit for follow-up treatment.          Annual Reminders:  Meet with Asthma Educator,  Flu  Shot in the Fall, consider Pneumonia Vaccination for patients with asthma (aged 19 and older).    Pharmacy: [facility] [address] [place] [facility] [address]  [address]                      Asthma Triggers  How To Control Things That Make Your Asthma Worse    Triggers are things that make your asthma worse.  Look at the list below to help you find your triggers and what you can do about them.  You can help prevent asthma flare-ups by staying away from your triggers.      Trigger                                                          What you can do   Cigarette Smoke  Tobacco smoke can make asthma worse. Do not allow smoking in your home, car or around you.  Be sure no one smokes at a child s day care or school.  If you smoke, ask your health care provider for ways to help you quit.  Ask family members to quit too.  Ask your health care provider for a referral to Quit Plan to help you quit smoking, or call 4-024-815-PLAN.     Colds, Flu, Bronchitis  These are common triggers of asthma. Wash your hands often.  Don t touch your eyes, nose or mouth.  Get a flu shot every year.     Dust Mites  These are tiny bugs that live in cloth or carpet. They are too small to see. Wash sheets and blankets in hot water every week.   Encase pillows and mattress in dust mite proof covers.  Avoid having carpet if you can. If you have carpet, vacuum weekly.   Use a dust mask and HEPA vacuum.   Pollen and Outdoor Mold  Some people are allergic to trees, grass, or weed pollen, or molds. Try to keep your windows closed.  Limit time out doors when pollen count is high.   Ask you health care provider about taking medicine during allergy season.     Animal Dander  Some people are allergic to skin flakes, urine or saliva from pets with fur or feathers. Keep pets with fur or feathers out of your home.    If you can t keep the pet outdoors, then keep the pet out of your bedroom.  Keep the bedroom door closed.  Keep pets off cloth furniture and  away from stuffed toys.     Mice, Rats, and Cockroaches  Some people are allergic to the waste from these pests.   Cover food and garbage.  Clean up spills and food crumbs.  Store grease in the refrigerator.   Keep food out of the bedroom.   Indoor Mold  This can be a trigger if your home has high moisture. Fix leaking faucets, pipes, or other sources of water.   Clean moldy surfaces.  Dehumidify basement if it is damp and smelly.   Smoke, Strong Odors, and Sprays  These can reduce air quality. Stay away from strong odors and sprays, such as perfume, powder, hair spray, paints, smoke incense, paint, cleaning products, candles and new carpet.   Exercise or Sports  Some people with asthma have this trigger. Be active!  Ask your doctor about taking medicine before sports or exercise to prevent symptoms.    Warm up for 5-10 minutes before and after sports or exercise.     Other Triggers of Asthma  Cold air:  Cover your nose and mouth with a scarf.  Sometimes laughing or crying can be a trigger.  Some medicines and food can trigger asthma.

## 2018-08-29 NOTE — PROGRESS NOTES
SUBJECTIVE:   Emroy Clark is a 77 year old male who presents to clinic today for the following health issues:      RESPIRATORY SYMPTOMS    He's  A leukemia survivor      Duration: 2 weeks , seems to come every summer with cold symptosm     Description  nasal congestion and cough    Severity: severe    Accompanying signs and symptoms: coughing up phlegm    History (predisposing factors):  asthma    Precipitating or alleviating factors: None    Therapies tried and outcome:  Dayquil and Nyquil     Past Medical History:   Diagnosis Date     Basal cell carcinoma of face      CARDIOVASCULAR SCREENING; LDL GOAL LESS THAN 160 1/8/2014     CLL (chronic lymphocytic leukaemia)      Elevated PSA      GERD (gastroesophageal reflux disease)      Insomnia      Pleural effusion 7/10/2014       Past Surgical History:   Procedure Laterality Date     basal cell excision       CHOLECYSTECTOMY       LAPAROSCOPIC APPENDECTOMY         Family History   Problem Relation Age of Onset     Neurologic Disorder Mother      Parkinsons     Other Cancer Father      CLL       Social History   Substance Use Topics     Smoking status: Never Smoker     Smokeless tobacco: Never Used     Alcohol use 0.0 oz/week     0 Standard drinks or equivalent per week      Comment: rare     /74 (BP Location: Left arm, Patient Position: Chair, Cuff Size: Adult Large)  Pulse 83  Temp 98.1  F (36.7  C) (Oral)  Resp 14  Ht 6' (1.829 m)  Wt 211 lb 4.8 oz (95.8 kg)  SpO2 94%  BMI 28.66 kg/m2    Patient complains of congestion with sore throat, nasal congestion and sinus pain. Some mucopurulant discharge but no upper dental pain and no sustained fever. Duration less than two weeks .  Has history of airborn allergy or asthma.   No chest pain, diaphoresis, or sob.  moderatelyproductive cough.  TMs dull not *red, sinus tenderness left   lungs clear, s1s2,soft abd,no distress, cyanosis or stridor.  A:URI with sinusitis and bronchitis   P:fluids,  antipyretics,rest and meds as directed.  Recheck PRN worsening or non-improvement over 5 days.  Discussed signs of systemic or constutional illness.  (B00.9) Herpes simplex virus infection  (primary encounter diagnosis)  Comment:   Plan: suppressive now, also had shingrix     (C91.10) Chronic lymphocytic leukemia (H)  Comment:   Plan: over 5 years     (J30.1) Acute seasonal allergic rhinitis due to pollen  Comment:   Plan: azithromycin (ZITHROMAX) 250 MG tablet,         guaiFENesin-codeine (ROBITUSSIN AC) 100-10         MG/5ML SOLN solution        sinus    (R05) Cough  Comment:   Plan: guaiFENesin-codeine (ROBITUSSIN AC) 100-10         MG/5ML SOLN solution        Lungs clear to exam

## 2018-08-29 NOTE — MR AVS SNAPSHOT
"              After Visit Summary   8/29/2018    Emory Clark    MRN: [id number]           Patient Information     Date Of Birth          1940        Visit Information        Provider Department      8/29/2018 10:30 AM Fabrice Humphreys MD [facility]        Today's Diagnoses     Herpes simplex virus infection    -  1    Chronic lymphocytic leukemia (H)        Acute seasonal allergic rhinitis due to pollen           Follow-ups after your visit        Follow-up notes from your care team     Return in about 21 days (around 9/19/2018) for Routine Visit.      Who to contact     If you have questions or need follow up information about today's clinic visit or your schedule please contact [facility] directly at [phone].  Normal or non-critical lab and imaging results will be communicated to you by MyChart, letter or phone within 4 business days after the clinic has received the results. If you do not hear from us within 7 days, please contact the clinic through MyChart or phone. If you have a critical or abnormal lab result, we will notify you by phone as soon as possible.  Submit refill requests through [employer] or call your pharmacy and they will forward the refill request to us. Please allow 3 business days for your refill to be completed.          Additional Information About Your Visit        MyChart Information     [employer] lets you send messages to your doctor, view your test results, renew your prescriptions, schedule appointments and more. To sign up, go to [email or web address] . Click on \"Log in\" on the left side of the screen, which will take you to the Welcome page. Then click on \"Sign up Now\" on the right side of the page.     You will be asked to enter the access code listed below, as well as some personal information. Please follow the directions to create your username and password.     Your access code is: [email or web address]  Expires: 11/27/2018 10:52 AM   "   Your access code will  in 90 days. If you need help or a new code, please call your [place] clinic or [phone].        Care EveryWhere ID     This is your Care EveryWhere ID. This could be used by other organizations to access your [place] medical records  [id number]        Your Vitals Were     Pulse Temperature Respirations Height Pulse Oximetry BMI (Body Mass Index)    83 98.1  F (36.7  C) (Oral) 14 6' (1.829 m) 94% 28.66 kg/m2       Blood Pressure from Last 3 Encounters:   18 136/74   18 130/74   18 116/68    Weight from Last 3 Encounters:   18 211 lb 4.8 oz (95.8 kg)   18 219 lb 3.2 oz (99.4 kg)   18 216 lb 1.6 oz (98 kg)              We Performed the Following     Asthma Action Plan (AAP)          Today's Medication Changes          These changes are accurate as of 18 10:53 AM.  If you have any questions, ask your nurse or doctor.               Start taking these medicines.        Dose/Directions    azithromycin 250 MG tablet   Commonly known as:  ZITHROMAX   Used for:  Acute seasonal allergic rhinitis due to pollen   Started by:  Fabrice Humphreys MD        Two tablets first day, then one tablet daily for four days.   Quantity:  6 tablet   Refills:  0         Stop taking these medicines if you haven't already. Please contact your care team if you have questions.     CALCIUM 600+D3 600-400 MG-UNIT per tablet   Generic drug:  calcium carbonate 600 mg-vitamin D 400 units   Stopped by:  Fabrice Humphreys MD           predniSONE 20 MG tablet   Commonly known as:  DELTASONE   Stopped by:  Fabrice Humphreys MD           valACYclovir 1000 mg tablet   Commonly known as:  VALTREX   Stopped by:  Fabrice Humphreys MD                Where to get your medicines      These medications were sent to [place] [address] [place] [facility] - [facility] [address] [facility]  [address] [facility], [facility] [address]     Phone:  [phone]     azithromycin 250  MG tablet                Primary Care Provider Office Phone # Fax #    Fabrice Humphreys [phone] [address] [address] [facility] [address]        Equal Access to Services     ANNE MARIE MARTIN : Hadwinter abbey gilbert ferdinando Soneftaliali, waaxda luqadaha, qaybta kaalmada adecitlaly, buzz matthewsmarissa pierre. So [facility] [phone].    ATENCIÓN: Si habla español, tiene a campuzano disposición servicios gratuitos de asistencia lingüística. Llame al [phone].    We comply with applicable federal civil rights laws and [place] laws. We do not discriminate on the basis of race, color, national origin, age, disability, sex, sexual orientation, or gender identity.            Thank you!     Thank you for choosing [facility]  for your care. Our goal is always to provide you with excellent care. Hearing back from our patients is one way we can continue to improve our services. Please take a few minutes to complete the written survey that you may receive in the mail after your visit with us. Thank you!             Your Updated Medication List - Protect others around you: Learn how to safely use, store and throw away your medicines at www.disposemymeds.org.          This list is accurate as of 8/29/18 10:53 AM.  Always use your most recent med list.                   Brand Name Dispense Instructions for use Diagnosis    acyclovir 400 MG tablet    ZOVIRAX     Take 400 mg by mouth        ASPIRIN NOT PRESCRIBED    INTENTIONAL    0 each    Please choose reason not prescribed, below    Hyperlipidemia LDL goal <100       azithromycin 250 MG tablet    ZITHROMAX    6 tablet    Two tablets first day, then one tablet daily for four days.    Acute seasonal allergic rhinitis due to pollen       IMBRUVICA 140 MG capsule   Generic drug:  ibrutinib      Take by mouth daily        MULTIVITAMINS PO      Take 1 tablet by mouth daily        traZODone 50 MG tablet    DESYREL    135 tablet    Take one or  two at bedtime as needed for sleep    Pleural effusion       VITAMIN B COMPLEX PO      Take 1 tablet by mouth daily

## 2018-08-30 ASSESSMENT — ASTHMA QUESTIONNAIRES: ACT_TOTALSCORE: 25

## 2018-09-07 ENCOUNTER — OFFICE VISIT (OUTPATIENT)
Dept: FAMILY MEDICINE | Facility: CLINIC | Age: 78
End: 2018-09-07
Payer: COMMERCIAL

## 2018-09-07 VITALS
SYSTOLIC BLOOD PRESSURE: 123 MMHG | WEIGHT: 208.6 LBS | OXYGEN SATURATION: 95 % | HEIGHT: 72 IN | DIASTOLIC BLOOD PRESSURE: 80 MMHG | TEMPERATURE: 97.6 F | RESPIRATION RATE: 12 BRPM | HEART RATE: 87 BPM | BODY MASS INDEX: 28.25 KG/M2

## 2018-09-07 DIAGNOSIS — R05.9 COUGH: ICD-10-CM

## 2018-09-07 DIAGNOSIS — D84.9 IMMUNOSUPPRESSION (H): ICD-10-CM

## 2018-09-07 DIAGNOSIS — G44.89 OTHER HEADACHE SYNDROME: Primary | ICD-10-CM

## 2018-09-07 LAB
BASOPHILS # BLD AUTO: 0.1 10E9/L (ref 0–0.2)
BASOPHILS NFR BLD AUTO: 0.3 %
CRP SERPL-MCNC: 6.8 MG/L (ref 0–8)
DIFFERENTIAL METHOD BLD: ABNORMAL
EOSINOPHIL # BLD AUTO: 0.3 10E9/L (ref 0–0.7)
EOSINOPHIL NFR BLD AUTO: 1.7 %
ERYTHROCYTE [DISTWIDTH] IN BLOOD BY AUTOMATED COUNT: 14.3 % (ref 10–15)
ERYTHROCYTE [SEDIMENTATION RATE] IN BLOOD BY WESTERGREN METHOD: 9 MM/H (ref 0–20)
HCT VFR BLD AUTO: 44.5 % (ref 40–53)
HGB BLD-MCNC: 14.7 G/DL (ref 13.3–17.7)
LYMPHOCYTES # BLD AUTO: 4.8 10E9/L (ref 0.8–5.3)
LYMPHOCYTES NFR BLD AUTO: 25.4 %
MCH RBC QN AUTO: 29.2 PG (ref 26.5–33)
MCHC RBC AUTO-ENTMCNC: 33 G/DL (ref 31.5–36.5)
MCV RBC AUTO: 89 FL (ref 78–100)
MONOCYTES # BLD AUTO: 1.7 10E9/L (ref 0–1.3)
MONOCYTES NFR BLD AUTO: 8.8 %
NEUTROPHILS # BLD AUTO: 12.1 10E9/L (ref 1.6–8.3)
NEUTROPHILS NFR BLD AUTO: 63.8 %
PLATELET # BLD AUTO: 321 10E9/L (ref 150–450)
RBC # BLD AUTO: 5.03 10E12/L (ref 4.4–5.9)
WBC # BLD AUTO: 18.9 10E9/L (ref 4–11)

## 2018-09-07 PROCEDURE — 36415 COLL VENOUS BLD VENIPUNCTURE: CPT | Performed by: PHYSICIAN ASSISTANT

## 2018-09-07 PROCEDURE — 99214 OFFICE O/P EST MOD 30 MIN: CPT | Performed by: PHYSICIAN ASSISTANT

## 2018-09-07 PROCEDURE — 85652 RBC SED RATE AUTOMATED: CPT | Performed by: PHYSICIAN ASSISTANT

## 2018-09-07 PROCEDURE — 86140 C-REACTIVE PROTEIN: CPT | Performed by: PHYSICIAN ASSISTANT

## 2018-09-07 PROCEDURE — 85025 COMPLETE CBC W/AUTO DIFF WBC: CPT | Performed by: PHYSICIAN ASSISTANT

## 2018-09-07 RX ORDER — LEVOFLOXACIN 500 MG/1
500 TABLET, FILM COATED ORAL DAILY
Qty: 7 TABLET | Refills: 0 | Status: SHIPPED | OUTPATIENT
Start: 2018-09-07 | End: 2019-08-16

## 2018-09-07 NOTE — PROGRESS NOTES
"  SUBJECTIVE:   Emory Clark is a 78 year old male who presents to clinic today for the following health issues:    Patient is a 79yo male with a hx of CLL, on imbruvica, who presents with a new onset left sided head pain over the past 1-2 weeks  The pain seems to be \"more on the outside\" and worse when touched  Sleep has been effected given pain  No changes in cognition/mental status  No vomiting, dizziness  No fevers or chills  -denies any vision changes  No hx of HA  Seen for upper resp infection in late august, started on azithromycin  Had shingles vaccine 8/13/18, they are wondering if related  Tries nyquil/dayquil; minimally helpful with symptoms     Problem list and histories reviewed & adjusted, as indicated.  Additional history: as documented    Patient Active Problem List   Diagnosis     Chronic lymphocytic leukemia (H)     Insomnia     GERD (gastroesophageal reflux disease)     CARDIOVASCULAR SCREENING; LDL GOAL LESS THAN 160     Pneumonia     Abnormal EKG     Elevated brain natriuretic peptide (BNP) level     Health Care Home     Pleural effusion     NSTEMI (non-ST elevated myocardial infarction) (H)     Cardiomyopathy (H)     Anemia associated with chemotherapy     Immunosuppression (H)     Mild persistent asthma     Advance Care Planning     History of acute renal failure     Past Surgical History:   Procedure Laterality Date     basal cell excision       CHOLECYSTECTOMY       LAPAROSCOPIC APPENDECTOMY         Social History   Substance Use Topics     Smoking status: Never Smoker     Smokeless tobacco: Never Used     Alcohol use 0.0 oz/week     0 Standard drinks or equivalent per week      Comment: rare     Family History   Problem Relation Age of Onset     Neurologic Disorder Mother      Parkinsons     Other Cancer Father      CLL           Reviewed and updated as needed this visit by clinical staff       Reviewed and updated as needed this visit by Provider         ROS:  CONSTITUTIONAL:POSITIVE  " for weight loss  INTEGUMENTARY/SKIN: NEGATIVE for new rash over painful scalp  EYE: NEGATIVE for vision change  ENT/MOUTH: POSITIVE for mild jaw pain after dental filling  RESP: NEGATIVE for significant cough or SOB  CV: NEGATIVE for chest pain, palpitations or peripheral edema  MUSCULOSKELETAL: NEGATIVE for significant arthralgias or myalgia  NEURO: NEGATIVE for weakness, dizziness; POSITIVE for fatigue, HA    OBJECTIVE:     /80 (BP Location: Right arm, Patient Position: Chair, Cuff Size: Adult Large)  Pulse 87  Temp 97.6  F (36.4  C) (Tympanic)  Resp 12  Ht 6' (1.829 m)  Wt 208 lb 9.6 oz (94.6 kg)  SpO2 95%  BMI 28.29 kg/m2  Body mass index is 28.29 kg/(m^2).  GENERAL: healthy, alert and no distress  EYES: Eyes grossly normal to inspection, PERRL and conjunctivae and sclerae normal  HENT: ear canals and TM's normal, nose and mouth without ulcers or lesions  NECK: no adenopathy  RESP: lungs clear to auscultation - no rales, rhonchi or wheezes  CV: regular rates and rhythm  SKIN: no lesions to painful scalp  NEURO: Normal strength and tone, mentation intact and speech normal    Diagnostic Test Results:  See A/P    ASSESSMENT/PLAN:   1. Other headache syndrome  His neuro exam was wnl today. There is no neck stiffness or photophobia to suggest meningitis. There are no lesions to suggest shingles. With the temporal, unilateral aspect consider gca so will screen inflammatory markers. Monitor closely. Urgent care over the weekend if worsening. We'll need to consider having him check in with his oncologist as well.   - ESR: Erythrocyte sedimentation rate  - CRP inflammation    2. Immunosuppression (H)  3. Cough  Franko's lungs were clear today and so we held off on xrays but if symptoms continue we discussed this may be a consideration. Given immunosuppression and ongoing cough will cover with below.   - levofloxacin (LEVAQUIN) 500 MG tablet; Take 1 tablet (500 mg) by mouth daily  Dispense: 7 tablet; Refill:  0  - CBC with platelets differential    Trevon Song PA-C  [facility]

## 2018-09-07 NOTE — LETTER
My Asthma Action Plan  Name: Emory Clark   YOB: 1940  Date: 9/7/2018   My doctor: Trevon Song PA-C   My clinic: [facility]        My Control Medicine: { :351687}  My Rescue Medicine: { :619422}  {AAP include Oral Steroid:141027} My Asthma Severity: { :557540}  Avoid your asthma triggers: { :925861}        {Is patient a child or adult?:041038}       GREEN ZONE   Good Control    I feel good    No cough or wheeze    Can work, sleep and play without asthma symptoms       Take your asthma control medicine every day.     1. If exercise triggers your asthma, take your rescue medication    15 minutes before exercise or sports, and    During exercise if you have asthma symptoms  2. Spacer to use with inhaler: If you have a spacer, make sure to use it with your inhaler             YELLOW ZONE Getting Worse  I have ANY of these:    I do not feel good    Cough or wheeze    Chest feels tight    Wake up at night   1. Keep taking your Green Zone medications  2. Start taking your rescue medicine:    every 20 minutes for up to 1 hour. Then every 4 hours for 24-48 hours.  3. If you stay in the Yellow Zone for more than 12-24 hours, contact your doctor.  4. If you do not return to the Green Zone in 12-24 hours or you get worse, start taking your oral steroid medicine if prescribed by your provider.           RED ZONE Medical Alert - Get Help  I have ANY of these:    I feel awful    Medicine is not helping    Breathing getting harder    Trouble walking or talking    Nose opens wide to breathe       1. Take your rescue medicine NOW  2. If your provider has prescribed an oral steroid medicine, start taking it NOW  3. Call your doctor NOW  4. If you are still in the Red Zone after 20 minutes and you have not reached your doctor:    Take your rescue medicine again and    Call 911 or go to the emergency room right away    See your regular doctor within 2 weeks of an Emergency Room or Urgent Care  visit for follow-up treatment.          Annual Reminders:  Meet with Asthma Educator,  Flu Shot in the Fall, consider Pneumonia Vaccination for patients with asthma (aged 19 and older).    Pharmacy: [facility] [id number] [place] [id number]                      Asthma Triggers  How To Control Things That Make Your Asthma Worse    Triggers are things that make your asthma worse.  Look at the list below to help you find your triggers and what you can do about them.  You can help prevent asthma flare-ups by staying away from your triggers.      Trigger                                                          What you can do   Cigarette Smoke  Tobacco smoke can make asthma worse. Do not allow smoking in your home, car or around you.  Be sure no one smokes at a child s day care or school.  If you smoke, ask your health care provider for ways to help you quit.  Ask family members to quit too.  Ask your health care provider for a referral to Quit Plan to help you quit smoking, or call 2-618-554-PLAN.     Colds, Flu, Bronchitis  These are common triggers of asthma. Wash your hands often.  Don t touch your eyes, nose or mouth.  Get a flu shot every year.     Dust Mites  These are tiny bugs that live in cloth or carpet. They are too small to see. Wash sheets and blankets in hot water every week.   Encase pillows and mattress in dust mite proof covers.  Avoid having carpet if you can. If you have carpet, vacuum weekly.   Use a dust mask and HEPA vacuum.   Pollen and Outdoor Mold  Some people are allergic to trees, grass, or weed pollen, or molds. Try to keep your windows closed.  Limit time out doors when pollen count is high.   Ask you health care provider about taking medicine during allergy season.     Animal Dander  Some people are allergic to skin flakes, urine or saliva from pets with fur or feathers. Keep pets with fur or feathers out of your home.    If you can t keep the pet outdoors, then keep the  pet out of your bedroom.  Keep the bedroom door closed.  Keep pets off cloth furniture and away from stuffed toys.     Mice, Rats, and Cockroaches  Some people are allergic to the waste from these pests.   Cover food and garbage.  Clean up spills and food crumbs.  Store grease in the refrigerator.   Keep food out of the bedroom.   Indoor Mold  This can be a trigger if your home has high moisture. Fix leaking faucets, pipes, or other sources of water.   Clean moldy surfaces.  Dehumidify basement if it is damp and smelly.   Smoke, Strong Odors, and Sprays  These can reduce air quality. Stay away from strong odors and sprays, such as perfume, powder, hair spray, paints, smoke incense, paint, cleaning products, candles and new carpet.   Exercise or Sports  Some people with asthma have this trigger. Be active!  Ask your doctor about taking medicine before sports or exercise to prevent symptoms.    Warm up for 5-10 minutes before and after sports or exercise.     Other Triggers of Asthma  Cold air:  Cover your nose and mouth with a scarf.  Sometimes laughing or crying can be a trigger.  Some medicines and food can trigger asthma.

## 2018-09-07 NOTE — MR AVS SNAPSHOT
"              After Visit Summary   2018    Emory Clark    MRN: [id number]           Patient Information     Date Of Birth          1940        Visit Information        Provider Department      2018 11:40 AM Trevon Song PA-C [facility]        Today's Diagnoses     Other headache syndrome    -  1    Immunosuppression (H)        Cough           Follow-ups after your visit        Follow-up notes from your care team     Return in about 1 week (around 2018) for recheck if symptoms are not improving..      Who to contact     If you have questions or need follow up information about today's clinic visit or your schedule please contact [facility] directly at [phone].  Normal or non-critical lab and imaging results will be communicated to you by MyChart, letter or phone within 4 business days after the clinic has received the results. If you do not hear from us within 7 days, please contact the clinic through [facility] or phone. If you have a critical or abnormal lab result, we will notify you by phone as soon as possible.  Submit refill requests through [employer] or call your pharmacy and they will forward the refill request to us. Please allow 3 business days for your refill to be completed.          Additional Information About Your Visit        MyChart Information     [employer] [facility] lets you send messages to your doctor, view your test results, renew your prescriptions, schedule appointments and more. To sign up, go to [email or web address] . Click on \"Log in\" on the left side of the screen, which will take you to the Welcome page. Then click on \"Sign up Now\" on the right side of the page.     You will be asked to enter the access code listed below, as well as some personal information. Please follow the directions to create your username and password.     Your access code is: [email or web address]  Expires: 2018 10:52 AM     Your access code will  in 90 days. " If you need help or a new code, please call your [place] clinic or [phone].        Care EveryWhere ID     This is your Care EveryWhere ID. This could be used by other organizations to access your [place] medical records  [id number]        Your Vitals Were     Pulse Temperature Respirations Height Pulse Oximetry BMI (Body Mass Index)    87 97.6  F (36.4  C) (Tympanic) 12 6' (1.829 m) 95% 28.29 kg/m2       Blood Pressure from Last 3 Encounters:   09/07/18 123/80   08/29/18 136/74   02/07/18 130/74    Weight from Last 3 Encounters:   09/07/18 208 lb 9.6 oz (94.6 kg)   08/29/18 211 lb 4.8 oz (95.8 kg)   02/07/18 219 lb 3.2 oz (99.4 kg)              We Performed the Following     CBC with platelets differential     CRP inflammation     ESR: Erythrocyte sedimentation rate          Today's Medication Changes          These changes are accurate as of 9/7/18 12:16 PM.  If you have any questions, ask your nurse or doctor.               Start taking these medicines.        Dose/Directions    levofloxacin 500 MG tablet   Commonly known as:  LEVAQUIN   Used for:  Immunosuppression (H), Cough   Started by:  Trevon Song PA-C        Dose:  500 mg   Take 1 tablet (500 mg) by mouth daily   Quantity:  7 tablet   Refills:  0            Where to get your medicines      These medications were sent to [place] [id number] [place] [facility] - [facility] [address] [place] [address]  [address] [facility] [address]     Phone:  [phone]     levofloxacin 500 MG tablet                Primary Care Provider Office Phone # Fax #    Fabrice Humphreys [phone] [address]  [facility] [address]        Equal Access to Services     SERGIO MARTIN : Lexi Ramirez, wajosefa sarkar, qachantelle kaalmada carlos manuel, buzz pierre. So [facility] [phone].    ATENCIÓN: Si habla español, tiene a campuzano disposición servicios gratuitos de asistencia lingüística. Llame al  [phone].    We comply with applicable federal civil rights laws and [place] laws. We do not discriminate on the basis of race, color, national origin, age, disability, sex, sexual orientation, or gender identity.            Thank you!     Thank you for choosing [facility]  for your care. Our goal is always to provide you with excellent care. Hearing back from our patients is one way we can continue to improve our services. Please take a few minutes to complete the written survey that you may receive in the mail after your visit with us. Thank you!             Your Updated Medication List - Protect others around you: Learn how to safely use, store and throw away your medicines at www.disposemymeds.org.          This list is accurate as of 9/7/18 12:16 PM.  Always use your most recent med list.                   Brand Name Dispense Instructions for use Diagnosis    acyclovir 400 MG tablet    ZOVIRAX     Take 400 mg by mouth        ASPIRIN NOT PRESCRIBED    INTENTIONAL    0 each    Please choose reason not prescribed, below    Hyperlipidemia LDL goal <100       guaiFENesin-codeine 100-10 MG/5ML Soln solution    ROBITUSSIN AC    180 mL    Take 5 mLs by mouth every 4 hours as needed for cough    Acute seasonal allergic rhinitis due to pollen, Cough       IMBRUVICA 140 MG capsule   Generic drug:  ibrutinib      Take by mouth daily        levofloxacin 500 MG tablet    LEVAQUIN    7 tablet    Take 1 tablet (500 mg) by mouth daily    Immunosuppression (H), Cough       MULTIVITAMINS PO      Take 1 tablet by mouth daily        traZODone 50 MG tablet    DESYREL    135 tablet    Take one or two at bedtime as needed for sleep    Pleural effusion       VITAMIN B COMPLEX PO      Take 1 tablet by mouth daily

## 2018-09-10 ENCOUNTER — TELEPHONE (OUTPATIENT)
Dept: FAMILY MEDICINE | Facility: CLINIC | Age: 78
End: 2018-09-10

## 2018-09-10 NOTE — TELEPHONE ENCOUNTER
Called patient to advise of Shay's recommendations below:  Patient states pain, same as when he saw Shay, but now it is moving down the back of the left side of his head (not involving neck). No sensitivity to light or vision changes. Patient has already started Levequin. Last night before bed took Aleve - no relief, in fact worst night so far.     Advised Shay wanted to see him back around 9/14 if no improvement.     Per lab results, Shay advised: CBC is finally back; some increase in the wbc count - 18.9 and neutrophils up to 12.1. Consider infection. Good to be starting the new antibiotics. If not improving or worsening, or developing sensitivity to light or vision changes, should be seen for recheck.     Routing to Shay and POD.     Angelica KAUR Triage RN

## 2018-09-10 NOTE — TELEPHONE ENCOUNTER
Huddled with Diana to see if same-day or next day appt ok. She stated next day would be fine.     Called patient to offer appt tomorrow with Shay. Patient in agreement.  Next 5 appointments (look out 90 days)     Sep 11, 2018 10:00 AM CDT   [facility] Visit with Trevon Song PA-C   [facility]    [address] [facility] [address] [phone]                Angelica KAUR, Preet RN

## 2018-09-10 NOTE — TELEPHONE ENCOUNTER
Reason for Call:  Lab results    Detailed comments: The pt was calling and he is wondering about his lab results from his appointment on 9/7/18.     Phone Number Patient can be reached at: Home number on file [phone] (home)    Best Time: After 9:30am today     Can we leave a detailed message on this number? YES    Call taken on 9/10/2018 at 8:15 AM by La Richardson

## 2018-09-11 ENCOUNTER — OFFICE VISIT (OUTPATIENT)
Dept: FAMILY MEDICINE | Facility: CLINIC | Age: 78
End: 2018-09-11
Payer: COMMERCIAL

## 2018-09-11 VITALS
TEMPERATURE: 97.3 F | HEIGHT: 72 IN | SYSTOLIC BLOOD PRESSURE: 117 MMHG | WEIGHT: 207.9 LBS | DIASTOLIC BLOOD PRESSURE: 66 MMHG | OXYGEN SATURATION: 99 % | HEART RATE: 103 BPM | BODY MASS INDEX: 28.16 KG/M2 | RESPIRATION RATE: 14 BRPM

## 2018-09-11 DIAGNOSIS — G44.89 OTHER HEADACHE SYNDROME: Primary | ICD-10-CM

## 2018-09-11 DIAGNOSIS — C91.10 CHRONIC LYMPHOCYTIC LEUKEMIA (H): ICD-10-CM

## 2018-09-11 DIAGNOSIS — D84.9 IMMUNOSUPPRESSION (H): ICD-10-CM

## 2018-09-11 LAB
BASOPHILS # BLD AUTO: 0.1 10E9/L (ref 0–0.2)
BASOPHILS NFR BLD AUTO: 0.5 %
DIFFERENTIAL METHOD BLD: ABNORMAL
EOSINOPHIL # BLD AUTO: 0.4 10E9/L (ref 0–0.7)
EOSINOPHIL NFR BLD AUTO: 2.8 %
ERYTHROCYTE [DISTWIDTH] IN BLOOD BY AUTOMATED COUNT: 14.4 % (ref 10–15)
HCT VFR BLD AUTO: 45.1 % (ref 40–53)
HGB BLD-MCNC: 14.7 G/DL (ref 13.3–17.7)
LYMPHOCYTES # BLD AUTO: 4.4 10E9/L (ref 0.8–5.3)
LYMPHOCYTES NFR BLD AUTO: 33.6 %
MCH RBC QN AUTO: 29 PG (ref 26.5–33)
MCHC RBC AUTO-ENTMCNC: 32.6 G/DL (ref 31.5–36.5)
MCV RBC AUTO: 89 FL (ref 78–100)
MONOCYTES # BLD AUTO: 1.5 10E9/L (ref 0–1.3)
MONOCYTES NFR BLD AUTO: 11.3 %
NEUTROPHILS # BLD AUTO: 6.9 10E9/L (ref 1.6–8.3)
NEUTROPHILS NFR BLD AUTO: 51.8 %
PLATELET # BLD AUTO: 285 10E9/L (ref 150–450)
RBC # BLD AUTO: 5.07 10E12/L (ref 4.4–5.9)
WBC # BLD AUTO: 13.2 10E9/L (ref 4–11)

## 2018-09-11 PROCEDURE — 99214 OFFICE O/P EST MOD 30 MIN: CPT | Performed by: PHYSICIAN ASSISTANT

## 2018-09-11 PROCEDURE — 36415 COLL VENOUS BLD VENIPUNCTURE: CPT | Performed by: PHYSICIAN ASSISTANT

## 2018-09-11 PROCEDURE — 85025 COMPLETE CBC W/AUTO DIFF WBC: CPT | Performed by: PHYSICIAN ASSISTANT

## 2018-09-11 RX ORDER — METHYLPREDNISOLONE 4 MG
TABLET, DOSE PACK ORAL
Qty: 21 TABLET | Refills: 0 | Status: SHIPPED | OUTPATIENT
Start: 2018-09-11 | End: 2019-08-16

## 2018-09-11 NOTE — MR AVS SNAPSHOT
After Visit Summary   9/11/2018    Emory Clark    MRN: [id number]           Patient Information     Date Of Birth          1940        Visit Information        Provider Department      9/11/2018 10:00 AM Trevon Song PA-C [facility]        Today's Diagnoses     Other headache syndrome    -  1    Immunosuppression (H)        Chronic lymphocytic leukemia (H)           Follow-ups after your visit        Follow-up notes from your care team     Return in about 1 week (around 9/18/2018).      Who to contact     If you have questions or need follow up information about today's clinic visit or your schedule please contact [facility] directly at [phone].  Normal or non-critical lab and imaging results will be communicated to you by SmartVineyardhart, letter or phone within 4 business days after the clinic has received the results. If you do not hear from us within 7 days, please contact the clinic through SmartVineyardhart or phone. If you have a critical or abnormal lab result, we will notify you by phone as soon as possible.  Submit refill requests through [employer] or call your pharmacy and they will forward the refill request to us. Please allow 3 business days for your refill to be completed.          Additional Information About Your Visit        MyChart Information     [employer] gives you secure access to your electronic health record. If you see a primary care provider, you can also send messages to your care team and make appointments. If you have questions, please call your primary care clinic.  If you do not have a primary care provider, please call [phone] and they will assist you.        Care EveryWhere ID     This is your Care EveryWhere ID. This could be used by other organizations to access your [place] medical records  [id number]        Your Vitals Were     Pulse Temperature Respirations Height Pulse Oximetry BMI (Body Mass Index)    103 97.3  F (36.3  C)  (Tympanic) 14 6' (1.829 m) 99% 28.2 kg/m2       Blood Pressure from Last 3 Encounters:   09/11/18 117/66   09/07/18 123/80   08/29/18 136/74    Weight from Last 3 Encounters:   09/11/18 207 lb 14.4 oz (94.3 kg)   09/07/18 208 lb 9.6 oz (94.6 kg)   08/29/18 211 lb 4.8 oz (95.8 kg)              We Performed the Following     CBC with platelets differential          Today's Medication Changes          These changes are accurate as of 9/11/18 11:17 AM.  If you have any questions, ask your nurse or doctor.               Start taking these medicines.        Dose/Directions    methylPREDNISolone 4 MG tablet   Commonly known as:  MEDROL DOSEPAK   Used for:  Other headache syndrome   Started by:  Trevon Song PA-C        Follow package instructions   Quantity:  21 tablet   Refills:  0            Where to get your medicines      These medications were sent to [place] [id number] [place] [employer] - [facility] [address] [facility]  [address] [facility] [address]     Phone:  [phone]     methylPREDNISolone 4 MG tablet                Primary Care Provider Office Phone # Fax #    Fabrice Darryl Humphreys [phone] [address] [facility] [address]        Equal Access to Services     ANNE MARIE MARTIN AH: Hadii abbey ku hadasho Soomaali, waaxda luqadaha, qaybta kaalmada adeegyada, waxanalia murphyin haymarissa pierre. So [facility] [phone].    ATENCIÓN: Si habla español, tiene a campuzano disposición servicios gratuitos de asistencia lingüística. [facility] [phone].    We comply with applicable federal civil rights laws and [place] laws. We do not discriminate on the basis of race, color, national origin, age, disability, sex, sexual orientation, or gender identity.            Thank you!     Thank you for choosing [facility]  for your care. Our goal is always to provide you with excellent care. Hearing back from our patients is one way we can continue to improve our services.  Please take a few minutes to complete the written survey that you may receive in the mail after your visit with us. Thank you!             Your Updated Medication List - Protect others around you: Learn how to safely use, store and throw away your medicines at www.disposemymeds.org.          This list is accurate as of 9/11/18 11:17 AM.  Always use your most recent med list.                   Brand Name Dispense Instructions for use Diagnosis    acyclovir 400 MG tablet    ZOVIRAX     Take 400 mg by mouth        ASPIRIN NOT PRESCRIBED    INTENTIONAL    0 each    Please choose reason not prescribed, below    Hyperlipidemia LDL goal <100       guaiFENesin-codeine 100-10 MG/5ML Soln solution    ROBITUSSIN AC    180 mL    Take 5 mLs by mouth every 4 hours as needed for cough    Acute seasonal allergic rhinitis due to pollen, Cough       IMBRUVICA 140 MG capsule   Generic drug:  ibrutinib      Take by mouth daily        levofloxacin 500 MG tablet    LEVAQUIN    7 tablet    Take 1 tablet (500 mg) by mouth daily    Immunosuppression (H), Cough       methylPREDNISolone 4 MG tablet    MEDROL DOSEPAK    21 tablet    Follow package instructions    Other headache syndrome       MULTIVITAMINS PO      Take 1 tablet by mouth daily        traZODone 50 MG tablet    DESYREL    135 tablet    Take one or two at bedtime as needed for sleep    Pleural effusion       VITAMIN B COMPLEX PO      Take 1 tablet by mouth daily

## 2018-09-11 NOTE — LETTER
My Asthma Action Plan  Name: Emory Clark   YOB: 1940  Date: 9/11/2018   My doctor: Trevon Song PA-C   My clinic: [facility]        My Control Medicine: { :292902}  My Rescue Medicine: { :160821}  {AAP include Oral Steroid:365180} My Asthma Severity: { :806904}  Avoid your asthma triggers: { :392141}        {Is patient a child or adult?:778258}       GREEN ZONE   Good Control    I feel good    No cough or wheeze    Can work, sleep and play without asthma symptoms       Take your asthma control medicine every day.     1. If exercise triggers your asthma, take your rescue medication    15 minutes before exercise or sports, and    During exercise if you have asthma symptoms  2. Spacer to use with inhaler: If you have a spacer, make sure to use it with your inhaler             YELLOW ZONE Getting Worse  I have ANY of these:    I do not feel good    Cough or wheeze    Chest feels tight    Wake up at night   1. Keep taking your Green Zone medications  2. Start taking your rescue medicine:    every 20 minutes for up to 1 hour. Then every 4 hours for 24-48 hours.  3. If you stay in the Yellow Zone for more than 12-24 hours, contact your doctor.  4. If you do not return to the Green Zone in 12-24 hours or you get worse, start taking your oral steroid medicine if prescribed by your provider.           RED ZONE Medical Alert - Get Help  I have ANY of these:    I feel awful    Medicine is not helping    Breathing getting harder    Trouble walking or talking    Nose opens wide to breathe       1. Take your rescue medicine NOW  2. If your provider has prescribed an oral steroid medicine, start taking it NOW  3. Call your doctor NOW  4. If you are still in the Red Zone after 20 minutes and you have not reached your doctor:    Take your rescue medicine again and    Call 911 or go to the emergency room right away    See your regular doctor within 2 weeks of an Emergency Room or Urgent Care  visit for follow-up treatment.          Annual Reminders:  Meet with Asthma Educator,  Flu Shot in the Fall, consider Pneumonia Vaccination for patients with asthma (aged 19 and older).    Pharmacy: [facility] [id number] [place] [address] [place] [phone]  [address]                      Asthma Triggers  How To Control Things That Make Your Asthma Worse    Triggers are things that make your asthma worse.  Look at the list below to help you find your triggers and what you can do about them.  You can help prevent asthma flare-ups by staying away from your triggers.      Trigger                                                          What you can do   Cigarette Smoke  Tobacco smoke can make asthma worse. Do not allow smoking in your home, car or around you.  Be sure no one smokes at a child s day care or school.  If you smoke, ask your health care provider for ways to help you quit.  Ask family members to quit too.  Ask your health care provider for a referral to Quit Plan to help you quit smoking, or call 7-107-059-PLAN.     Colds, Flu, Bronchitis  These are common triggers of asthma. Wash your hands often.  Don t touch your eyes, nose or mouth.  Get a flu shot every year.     Dust Mites  These are tiny bugs that live in cloth or carpet. They are too small to see. Wash sheets and blankets in hot water every week.   Encase pillows and mattress in dust mite proof covers.  Avoid having carpet if you can. If you have carpet, vacuum weekly.   Use a dust mask and HEPA vacuum.   Pollen and Outdoor Mold  Some people are allergic to trees, grass, or weed pollen, or molds. Try to keep your windows closed.  Limit time out doors when pollen count is high.   Ask you health care provider about taking medicine during allergy season.     Animal Dander  Some people are allergic to skin flakes, urine or saliva from pets with fur or feathers. Keep pets with fur or feathers out of your home.    If you can t keep the pet outdoors, then keep the  pet out of your bedroom.  Keep the bedroom door closed.  Keep pets off cloth furniture and away from stuffed toys.     Mice, Rats, and Cockroaches  Some people are allergic to the waste from these pests.   Cover food and garbage.  Clean up spills and food crumbs.  Store grease in the refrigerator.   Keep food out of the bedroom.   Indoor Mold  This can be a trigger if your home has high moisture. Fix leaking faucets, pipes, or other sources of water.   Clean moldy surfaces.  Dehumidify basement if it is damp and smelly.   Smoke, Strong Odors, and Sprays  These can reduce air quality. Stay away from strong odors and sprays, such as perfume, powder, hair spray, paints, smoke incense, paint, cleaning products, candles and new carpet.   Exercise or Sports  Some people with asthma have this trigger. Be active!  Ask your doctor about taking medicine before sports or exercise to prevent symptoms.    Warm up for 5-10 minutes before and after sports or exercise.     Other Triggers of Asthma  Cold air:  Cover your nose and mouth with a scarf.  Sometimes laughing or crying can be a trigger.  Some medicines and food can trigger asthma.

## 2018-09-11 NOTE — PROGRESS NOTES
"  SUBJECTIVE:   Emory Clark is a 78 year old male who presents to clinic today for the following health issues:    Patient is here to follow up from previous appt regarding head pain.   He notes that his head pain remains, but has shifted slightly down the back of the head  It is not into the neck, there is no neck stiffness  The pain level seems less sharp; aleve seems to help the pain move from 5 to 2  The head continues to be sensitive to touch  Has not felt feverish or chilled  Does note that last night the eye on that side \"feels different\"   -vision is not changed - no blurring, tearing or pain    His cough is improving; coughing only half as much  There is no shortness of breath  Still with appetite suppression      Problem list and histories reviewed & adjusted, as indicated.  Additional history: as documented    Patient Active Problem List   Diagnosis     Chronic lymphocytic leukemia (H)     Insomnia     GERD (gastroesophageal reflux disease)     CARDIOVASCULAR SCREENING; LDL GOAL LESS THAN 160     Pneumonia     Abnormal EKG     Elevated brain natriuretic peptide (BNP) level     Health Care Home     Pleural effusion     NSTEMI (non-ST elevated myocardial infarction) (H)     Cardiomyopathy (H)     Anemia associated with chemotherapy     Immunosuppression (H)     Mild persistent asthma     Advance Care Planning     History of acute renal failure     Past Surgical History:   Procedure Laterality Date     basal cell excision       CHOLECYSTECTOMY       LAPAROSCOPIC APPENDECTOMY         Social History   Substance Use Topics     Smoking status: Never Smoker     Smokeless tobacco: Never Used     Alcohol use 0.0 oz/week     0 Standard drinks or equivalent per week      Comment: rare     Family History   Problem Relation Age of Onset     Neurologic Disorder Mother      Parkinsons     Other Cancer Father      CLL           Reviewed and updated as needed this visit by clinical staff       Reviewed and updated as " needed this visit by Provider         ROS:  See above    OBJECTIVE:     /66  Pulse 103  Temp 97.3  F (36.3  C) (Tympanic)  Resp 14  Ht 6' (1.829 m)  Wt 207 lb 14.4 oz (94.3 kg)  SpO2 99%  BMI 28.2 kg/m2  Body mass index is 28.2 kg/(m^2).  GENERAL: healthy, alert and no distress  EYES: Eyes grossly normal to inspection, PERRL and conjunctivae and sclerae normal  NECK: no carotid bruits  RESP: lungs clear to auscultation - no rales, rhonchi or wheezes  CV: regular rates and rhythm, normal S1 S2, no S3 or S4 and no murmur, click or rub  MS: no gross musculoskeletal defects noted, no edema  NEURO: Normal strength and tone, mentation intact and speech normal  PSYCH: mentation appears normal, affect normal/bright    Diagnostic Test Results:  Results for orders placed or performed in visit on 09/11/18 (from the past 24 hour(s))   CBC with platelets differential   Result Value Ref Range    WBC 13.2 (H) 4.0 - 11.0 10e9/L    RBC Count 5.07 4.4 - 5.9 10e12/L    Hemoglobin 14.7 13.3 - 17.7 g/dL    Hematocrit 45.1 40.0 - 53.0 %    MCV 89 78 - 100 fl    MCH 29.0 26.5 - 33.0 pg    MCHC 32.6 31.5 - 36.5 g/dL    RDW 14.4 10.0 - 15.0 %    Platelet Count 285 150 - 450 10e9/L    % Neutrophils 51.8 %    % Lymphocytes 33.6 %    % Monocytes 11.3 %    % Eosinophils 2.8 %    % Basophils 0.5 %    Absolute Neutrophil 6.9 1.6 - 8.3 10e9/L    Absolute Lymphocytes 4.4 0.8 - 5.3 10e9/L    Absolute Monocytes 1.5 (H) 0.0 - 1.3 10e9/L    Absolute Eosinophils 0.4 0.0 - 0.7 10e9/L    Absolute Basophils 0.1 0.0 - 0.2 10e9/L    Diff Method Automated Method        ASSESSMENT/PLAN:   1. Other headache syndrome  Unclear etiology. This seems more of a neuralgia then other true headache. Less likely meningitis. There is no neurologic compromise. I'd like him to touch base with his onc team to determine if there is any risk from his medications. I see no lesions to indicate zoster. Again no neurologic change to suggest bells. He notes one other  time previously with similar symptoms he had a steroid which improved symptoms so will trial this, though he'll run this by his team at [place] first. As usual, his spouse keeps a good eye on him and if any exacerbation he'll be seen urgently.   - CBC with platelets differential  - methylPREDNISolone (MEDROL DOSEPAK) 4 MG tablet; Follow package instructions  Dispense: 21 tablet; Refill: 0    2. Immunosuppression (H)  3. Chronic lymphocytic leukemia (H)  Franko's lungs are clear and his cbc is trending well while on the levaquin. We'll have him finish the meds.  - CBC with platelets differential    Trevon Song PA-C  [facility]

## 2019-08-15 NOTE — PROGRESS NOTES
Subjective     Emory Clark is a 78 year old male who presents to clinic today for the following health issues:    HPI   Musculoskeletal problem/pain      Duration: 1 week     Description  Location: right lower leg    Intensity:  Moderate to severe    Accompanying signs and symptoms: none    History  Previous similar problem: no   Previous evaluation:  none    Precipitating or alleviating factors:  Trauma or overuse: no   Aggravating factors include: golfing     Therapies tried and outcome: Topical pain cream     -Patient is a 77yo male who presents with new onset right lower leg pain since Saturday  -he noted that after standing for a long period of time while working at the [employer] he noted increase inner leg pain  -there was no injury hx  -went golfing the next day and symptoms seemed worse; having trouble walking  -the pain is dull  -no swelling at all  -no skin changes  -no fevers, chills  -no change in the symptoms since onset; not better or worse    Reviewed and updated as needed this visit by Provider         Review of Systems   ROS COMP: Constitutional, HEENT, cardiovascular, pulmonary, gi and gu systems are negative, except as otherwise noted.      Objective    /62   Pulse 50   Temp 97  F (36.1  C) (Tympanic)   Resp 18   Ht 1.829 m (6')   Wt 98.3 kg (216 lb 12.8 oz)   SpO2 96%   BMI 29.40 kg/m    Body mass index is 29.4 kg/m .  Physical Exam   GENERAL: healthy, alert and no distress  MS/SKIN: there is no swelling warmth or erythema. He ambulates with slight limp. Pain is worse when weight bearing. There is worsened pain in the posterior upper leg with resisted flexion at the knee. SLR is negative. Pedal pulses in tact    Diagnostic Test Results:  none         Assessment & Plan     1. Pain of right lower extremity  Unclear etiology. No red flags to suggest clot/infection but will want to r/u if symptoms persist. There is no injury hx but symptoms and exam point to msk. Trial below along  with topical. To follow up if not improving or worsening  -  Lower Extremity Venous Duplex Right; Future  - methylPREDNISolone (MEDROL DOSEPAK) 4 MG tablet therapy pack; Follow Package Directions  Dispense: 21 tablet; Refill: 0     BMI:   Estimated body mass index is 29.4 kg/m  as calculated from the following:    Height as of this encounter: 1.829 m (6').    Weight as of this encounter: 98.3 kg (216 lb 12.8 oz).       Return in about 3 days (around 8/19/2019) for Let us know how things are going!.    Trevon Song PA-C  [facility]

## 2019-08-16 ENCOUNTER — OFFICE VISIT (OUTPATIENT)
Dept: FAMILY MEDICINE | Facility: CLINIC | Age: 79
End: 2019-08-16
Payer: COMMERCIAL

## 2019-08-16 VITALS
HEIGHT: 72 IN | DIASTOLIC BLOOD PRESSURE: 62 MMHG | TEMPERATURE: 97 F | BODY MASS INDEX: 29.36 KG/M2 | OXYGEN SATURATION: 96 % | HEART RATE: 50 BPM | SYSTOLIC BLOOD PRESSURE: 136 MMHG | WEIGHT: 216.8 LBS | RESPIRATION RATE: 18 BRPM

## 2019-08-16 DIAGNOSIS — M79.604 PAIN OF RIGHT LOWER EXTREMITY: Primary | ICD-10-CM

## 2019-08-16 PROCEDURE — 99213 OFFICE O/P EST LOW 20 MIN: CPT | Performed by: PHYSICIAN ASSISTANT

## 2019-08-16 RX ORDER — METHYLPREDNISOLONE 4 MG
TABLET, DOSE PACK ORAL
Qty: 21 TABLET | Refills: 0 | Status: SHIPPED | OUTPATIENT
Start: 2019-08-16 | End: 2019-09-13

## 2019-08-16 ASSESSMENT — MIFFLIN-ST. JEOR: SCORE: 1751.4

## 2019-08-16 NOTE — LETTER
My Asthma Action Plan  Name: Emory Clark   YOB: 1940  Date: 8/16/2019   My doctor: Trevon Song PA-C   My clinic: [facility]        My Control Medicine: { :930813}  My Rescue Medicine: { :770999}  {AAP include Oral Steroid:383081} My Asthma Severity: { :522329}  Avoid your asthma triggers: { :038711}        {Is patient a child or adult?:159533}       GREEN ZONE   Good Control    I feel good    No cough or wheeze    Can work, sleep and play without asthma symptoms       Take your asthma control medicine every day.     1. If exercise triggers your asthma, take your rescue medication    15 minutes before exercise or sports, and    During exercise if you have asthma symptoms  2. Spacer to use with inhaler: If you have a spacer, make sure to use it with your inhaler             YELLOW ZONE Getting Worse  I have ANY of these:    I do not feel good    Cough or wheeze    Chest feels tight    Wake up at night   1. Keep taking your Green Zone medications  2. Start taking your rescue medicine:    every 20 minutes for up to 1 hour. Then every 4 hours for 24-48 hours.  3. If you stay in the Yellow Zone for more than 12-24 hours, contact your doctor.  4. If you do not return to the Green Zone in 12-24 hours or you get worse, start taking your oral steroid medicine if prescribed by your provider.           RED ZONE Medical Alert - Get Help  I have ANY of these:    I feel awful    Medicine is not helping    Breathing getting harder    Trouble walking or talking    Nose opens wide to breathe       1. Take your rescue medicine NOW  2. If your provider has prescribed an oral steroid medicine, start taking it NOW  3. Call your doctor NOW  4. If you are still in the Red Zone after 20 minutes and you have not reached your doctor:    Take your rescue medicine again and    Call 911 or go to the emergency room right away    See your regular doctor within 2 weeks of an Emergency Room or Urgent Care  visit for follow-up treatment.          Annual Reminders:  Meet with Asthma Educator,  Flu Shot in the Fall, consider Pneumonia Vaccination for patients with asthma (aged 19 and older).    Pharmacy: [facility] [id number] [place] [address] [place] [phone]  [address]                      Asthma Triggers  How To Control Things That Make Your Asthma Worse    Triggers are things that make your asthma worse.  Look at the list below to help you find your triggers and what you can do about them.  You can help prevent asthma flare-ups by staying away from your triggers.      Trigger                                                          What you can do   Cigarette Smoke  Tobacco smoke can make asthma worse. Do not allow smoking in your home, car or around you.  Be sure no one smokes at a child s day care or school.  If you smoke, ask your health care provider for ways to help you quit.  Ask family members to quit too.  Ask your health care provider for a referral to Quit Plan to help you quit smoking, or call 2-096-504-PLAN.     Colds, Flu, Bronchitis  These are common triggers of asthma. Wash your hands often.  Don t touch your eyes, nose or mouth.  Get a flu shot every year.     Dust Mites  These are tiny bugs that live in cloth or carpet. They are too small to see. Wash sheets and blankets in hot water every week.   Encase pillows and mattress in dust mite proof covers.  Avoid having carpet if you can. If you have carpet, vacuum weekly.   Use a dust mask and HEPA vacuum.   Pollen and Outdoor Mold  Some people are allergic to trees, grass, or weed pollen, or molds. Try to keep your windows closed.  Limit time out doors when pollen count is high.   Ask you health care provider about taking medicine during allergy season.     Animal Dander  Some people are allergic to skin flakes, urine or saliva from pets with fur or feathers. Keep pets with fur or feathers out of your home.    If you can t keep the pet outdoors, then keep the  pet out of your bedroom.  Keep the bedroom door closed.  Keep pets off cloth furniture and away from stuffed toys.     Mice, Rats, and Cockroaches  Some people are allergic to the waste from these pests.   Cover food and garbage.  Clean up spills and food crumbs.  Store grease in the refrigerator.   Keep food out of the bedroom.   Indoor Mold  This can be a trigger if your home has high moisture. Fix leaking faucets, pipes, or other sources of water.   Clean moldy surfaces.  Dehumidify basement if it is damp and smelly.   Smoke, Strong Odors, and Sprays  These can reduce air quality. Stay away from strong odors and sprays, such as perfume, powder, hair spray, paints, smoke incense, paint, cleaning products, candles and new carpet.   Exercise or Sports  Some people with asthma have this trigger. Be active!  Ask your doctor about taking medicine before sports or exercise to prevent symptoms.    Warm up for 5-10 minutes before and after sports or exercise.     Other Triggers of Asthma  Cold air:  Cover your nose and mouth with a scarf.  Sometimes laughing or crying can be a trigger.  Some medicines and food can trigger asthma.

## 2019-08-16 NOTE — PATIENT INSTRUCTIONS
Please let us know on Monday how things are going. If not improving I am going to recommend getting an ultrasound of the leg.

## 2019-08-17 ASSESSMENT — ASTHMA QUESTIONNAIRES: ACT_TOTALSCORE: 25

## 2019-08-31 ENCOUNTER — OFFICE VISIT (OUTPATIENT)
Dept: URGENT CARE | Facility: URGENT CARE | Age: 79
End: 2019-08-31
Payer: COMMERCIAL

## 2019-08-31 VITALS
DIASTOLIC BLOOD PRESSURE: 84 MMHG | TEMPERATURE: 98.4 F | HEART RATE: 106 BPM | WEIGHT: 216 LBS | SYSTOLIC BLOOD PRESSURE: 140 MMHG | RESPIRATION RATE: 19 BRPM | BODY MASS INDEX: 29.29 KG/M2 | OXYGEN SATURATION: 95 %

## 2019-08-31 DIAGNOSIS — R05.9 COUGH: Primary | ICD-10-CM

## 2019-08-31 PROCEDURE — 99214 OFFICE O/P EST MOD 30 MIN: CPT | Performed by: FAMILY MEDICINE

## 2019-08-31 RX ORDER — CEFDINIR 300 MG/1
300 CAPSULE ORAL 2 TIMES DAILY
Qty: 20 CAPSULE | Refills: 0 | Status: SHIPPED | OUTPATIENT
Start: 2019-08-31 | End: 2019-09-13

## 2019-08-31 RX ORDER — PREDNISONE 20 MG/1
40 TABLET ORAL DAILY
Qty: 10 TABLET | Refills: 0 | Status: SHIPPED | OUTPATIENT
Start: 2019-08-31 | End: 2019-09-13

## 2019-08-31 NOTE — PROGRESS NOTES
Subjective     Emory Clark is a 78 year old male who presents to clinic today for the following health issues:    States that he had a cold over the last several days it seems to be getting worse as well, occasional irritation of the throat because of the cough.  He has had fever.    Past medical history of CLL    Review of his chart shows he is been treated for bronchitis in the past with azithromycin.  He says that he was told by the [facility] that this is not strong enough for him and he probably needs a longer dose of medication    He has also used steroids in the past for his bronchitis    No chills no significant complaints of shortness of breath    Does feel congested    HPI         Patient Active Problem List   Diagnosis     Chronic lymphocytic leukemia (H)     Insomnia     GERD (gastroesophageal reflux disease)     CARDIOVASCULAR SCREENING; LDL GOAL LESS THAN 160     Pneumonia     Abnormal EKG     Elevated brain natriuretic peptide (BNP) level     Health Care Home     Pleural effusion     NSTEMI (non-ST elevated myocardial infarction) (H)     Cardiomyopathy (H)     Anemia associated with chemotherapy     Immunosuppression (H)     Mild persistent asthma     Advance Care Planning     History of acute renal failure     Past Surgical History:   Procedure Laterality Date     basal cell excision       CHOLECYSTECTOMY       LAPAROSCOPIC APPENDECTOMY         Social History     Tobacco Use     Smoking status: Never Smoker     Smokeless tobacco: Never Used   Substance Use Topics     Alcohol use: Yes     Alcohol/week: 0.0 oz     Comment: rare     Family History   Problem Relation Age of Onset     Neurologic Disorder Mother         Parkinsons     Other Cancer Father         CLL           Reviewed and updated as needed this visit by Provider         Review of Systems   ROS COMP: Constitutional, HEENT, cardiovascular, pulmonary, GI, , musculoskeletal, neuro, skin, endocrine and psych systems are negative, except  as otherwise noted.      Objective    BP (!) 140/84   Pulse 106   Temp 98.4  F (36.9  C) (Oral)   Resp 19   Wt 98 kg (216 lb)   SpO2 95%   BMI 29.29 kg/m    Body mass index is 29.29 kg/m .  Physical Exam   GENERAL: alert and mild distress  HENT: Inferior turbinates are enlarged bilaterally and there is drainage drainage appears clear  NECK: no adenopathy, no asymmetry, masses, or scars and thyroid normal to palpation  RESP: lungs coarse breath sounds  CV: regular rate and rhythm, normal S1 S2, no S3 or S4, no murmur, click or rub, no peripheral edema and peripheral pulses strong  ABDOMEN: soft, nontender, no hepatosplenomegaly, no masses and bowel sounds normal  MS: no gross musculoskeletal defects noted, no edema  SKIN: no suspicious lesions or rashes  NEURO: Normal strength and tone, mentation intact and speech normal  BACK: no CVA tenderness, no paralumbar tenderness    Diagnostic Test Results:  Labs reviewed in Epic  none         Assessment & Plan       ICD-10-CM    1. Cough R05 predniSONE (DELTASONE) 20 MG tablet     cefdinir (OMNICEF) 300 MG capsule   2.  Bronchitis    3.  Upper respiratory infection      This infection may be viral however he does have a history of CLL and the thought he has been to treat him for the potential of developing a bacterial infection.    I do agree that at this time I think he also has a viral infection however because of past treatment and guidance from the [facility] we will use oral antibiotics.    We will switch from azithromycin that he is been using to Omnicef    In addition we will 5 days of prednisone 40 mg a day as a burst because of the drainage that he has cough that he has    He is encouraged to try to follow-up with his primary care.    This is a long weekend and if he needs other treatment he is encouraged to return to the urgent care for follow-up     BMI:   Estimated body mass index is 29.29 kg/m  as calculated from the following:    Height as of 8/16/19:  1.829 m (6').    Weight as of this encounter: 98 kg (216 lb).             Everton Garcia MD  [facility]

## 2019-09-13 ENCOUNTER — OFFICE VISIT (OUTPATIENT)
Dept: FAMILY MEDICINE | Facility: CLINIC | Age: 79
End: 2019-09-13
Payer: COMMERCIAL

## 2019-09-13 ENCOUNTER — ANCILLARY PROCEDURE (OUTPATIENT)
Dept: GENERAL RADIOLOGY | Facility: CLINIC | Age: 79
End: 2019-09-13
Attending: NURSE PRACTITIONER
Payer: COMMERCIAL

## 2019-09-13 VITALS
RESPIRATION RATE: 20 BRPM | BODY MASS INDEX: 28.73 KG/M2 | TEMPERATURE: 98 F | HEART RATE: 90 BPM | DIASTOLIC BLOOD PRESSURE: 62 MMHG | WEIGHT: 211.8 LBS | OXYGEN SATURATION: 96 % | SYSTOLIC BLOOD PRESSURE: 108 MMHG

## 2019-09-13 DIAGNOSIS — R05.9 COUGH: Primary | ICD-10-CM

## 2019-09-13 DIAGNOSIS — R05.9 COUGH: ICD-10-CM

## 2019-09-13 DIAGNOSIS — D84.9 IMMUNOSUPPRESSION (H): ICD-10-CM

## 2019-09-13 PROBLEM — R33.9 RETENTION OF URINE: Status: ACTIVE | Noted: 2019-05-30

## 2019-09-13 PROBLEM — K57.30 DIVERTICULOSIS OF LARGE INTESTINE: Status: ACTIVE | Noted: 2019-09-13

## 2019-09-13 PROCEDURE — 71046 X-RAY EXAM CHEST 2 VIEWS: CPT | Mod: FY

## 2019-09-13 PROCEDURE — 99214 OFFICE O/P EST MOD 30 MIN: CPT | Performed by: NURSE PRACTITIONER

## 2019-09-13 RX ORDER — VIT C/E/CUPERIC/ZINC/LUTEIN 226-90-0.8
1 CAPSULE ORAL 2 TIMES DAILY
COMMUNITY

## 2019-09-13 RX ORDER — TAMSULOSIN HYDROCHLORIDE 0.4 MG/1
0.4 CAPSULE ORAL
COMMUNITY
Start: 2019-08-23 | End: 2020-08-22

## 2019-09-13 RX ORDER — LEVOFLOXACIN 500 MG/1
500 TABLET, FILM COATED ORAL DAILY
Qty: 10 TABLET | Refills: 0 | Status: SHIPPED | OUTPATIENT
Start: 2019-09-13 | End: 2020-05-21

## 2019-09-13 ASSESSMENT — ENCOUNTER SYMPTOMS
WHEEZING: 0
TROUBLE SWALLOWING: 0
EYE ITCHING: 1
EYE REDNESS: 1
APPETITE CHANGE: 0
SINUS PAIN: 0
SINUS PRESSURE: 0
CHILLS: 0
FEVER: 0
SHORTNESS OF BREATH: 0
FATIGUE: 0
FACIAL SWELLING: 0
ACTIVITY CHANGE: 0
CHEST TIGHTNESS: 0
EYE DISCHARGE: 0
RHINORRHEA: 0
GASTROINTESTINAL NEGATIVE: 1
COUGH: 1
PHOTOPHOBIA: 0
SORE THROAT: 1

## 2019-09-13 NOTE — PROGRESS NOTES
"SUBJECTIVE:   Emory Clark is a 79 year old male   who presents to clinic today for the following health issues:      RESPIRATORY SYMPTOMS/UC follow up      Duration: 2.5 weeks of cough and congestion.  Was seen in urgent care on 8/31/2019.  Was treated for bronchitis with omnicef and steroid burst.  Got \"about 10% better\" and now in the past few days feels worse again.      Description  nasal congestion, facial pain/pressure and cough--no shortness of breath.  Denies wheezing. Intermittent productive cough.      Severity: moderate    Accompanying signs and symptoms: None    History (predisposing factors):  none    Precipitating or alleviating factors: None    Therapies tried and outcome:  Omnicef and Prednisone without relief. 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)     Elevated prostate specific antigen (PSA)     Insomnia     GERD (gastroesophageal reflux disease)     CARDIOVASCULAR SCREENING; LDL GOAL LESS THAN 160     Pneumonia     Abnormal EKG     Elevated brain natriuretic peptide (BNP) level     Health Care Home     Pleural effusion     NSTEMI (non-ST elevated myocardial infarction) (H)     Cardiomyopathy (H)     Anemia associated with chemotherapy     Immunosuppression (H)     Mild persistent asthma     Advance Care Planning     History of acute renal failure     Retention of urine     Diverticulosis of large intestine     Past Surgical History:   Procedure Laterality Date     basal cell excision       CHOLECYSTECTOMY       LAPAROSCOPIC APPENDECTOMY         Social History     Tobacco Use     Smoking status: Never Smoker     Smokeless tobacco: Never Used   Substance Use Topics     Alcohol use: Yes     " Alcohol/week: 0.0 oz     Comment: rare     Family History   Problem Relation Age of Onset     Neurologic Disorder Mother         Parkinsons     Other Cancer Father         CLL            ROS:  Review of Systems   Constitutional: Negative for activity change, appetite change, chills, fatigue and fever.   HENT: Positive for congestion, postnasal drip and sore throat. Negative for ear discharge, facial swelling, rhinorrhea, sinus pressure, sinus pain and trouble swallowing.    Eyes: Positive for redness and itching. Negative for photophobia, discharge and visual disturbance.   Respiratory: Positive for cough. Negative for chest tightness, shortness of breath and wheezing.    Cardiovascular: Negative for chest pain.   Gastrointestinal: Negative.    Genitourinary: Negative.    Skin: Negative.          OBJECTIVE:                                                    /62   Pulse 90   Temp 98  F (36.7  C) (Oral)   Resp 20   Wt 96.1 kg (211 lb 12.8 oz)   SpO2 96%   BMI 28.73 kg/m   Body mass index is 28.73 kg/m .     Physical Exam   Constitutional: He appears well-developed and well-nourished.   HENT:   Right Ear: External ear normal.   Left Ear: External ear normal.   Mouth/Throat: Oropharynx is clear and moist.   Eyes:   Mild amount of conjunctival erythema   Neck: Normal range of motion. Neck supple.   Cardiovascular: Normal rate and regular rhythm.   Pulmonary/Chest: Effort normal. No respiratory distress. He has no wheezes. He has rales.   Left lower lobe rales noted   Lymphadenopathy:     He has no cervical adenopathy.        ASSESSMENT/PLAN:                                                      Problem List Items Addressed This Visit     Immunosuppression (H)      Other Visit Diagnoses     Cough    -  Primary    Early pneumonia is concern.  CXR without large infiltrate.   Will hold off on steroid burst at this time    Relevant Medications    levofloxacin (LEVAQUIN) 500 MG tablet    Other Relevant Orders    XR  Chest 2 Views (Completed)               Patient Instructions   Please follow up in clinic or in urgent care if you are worsening or not improving        No follow-ups on file.      JAYESH Saucedo CNP  [facility] [place]

## 2019-12-14 NOTE — TELEPHONE ENCOUNTER
Can we please call iraida, he does have hx of MI in the past, but he is not on ASA or statin, or any other heart medicine, can we ask why? I think it is important to be on such medications to improve outcome and decrease risk for another heart attack.  I recommend discussing it with his primary.  Ari Boland MD  [facility]  [phone]     hemorrhoids

## 2020-03-02 ENCOUNTER — HEALTH MAINTENANCE LETTER (OUTPATIENT)
Age: 80
End: 2020-03-02

## 2020-05-20 NOTE — PATIENT INSTRUCTIONS
Hold Aspirin, NSAIDs or Steroids 7 days prior to surgery, Tylenol okay for pain.    Hold all medication morning of procedure except stop the Imbruvica on Saturday.      Before Your Surgery      Call your surgeon if there is any change in your health. This includes signs of a cold or flu (such as a sore throat, runny nose, cough, rash or fever).    Do not smoke, drink alcohol or take over the counter medicine (unless your surgeon or primary care doctor tells you to) for the 24 hours before and after surgery.    If you take prescribed drugs: Follow your doctor s orders about which medicines to take and which to stop until after surgery.    Eating and drinking prior to surgery: follow the instructions from your surgeon    Take a shower or bath the night before surgery. Use the soap your surgeon gave you to gently clean your skin. If you do not have soap from your surgeon, use your regular soap. Do not shave or scrub the surgery site.  Wear clean pajamas and have clean sheets on your bed.

## 2020-05-20 NOTE — PROGRESS NOTES
[facility]  [address] [address] [address] [phone]  Dept: [phone]    PRE-OP EVALUATION:  Today's date: 2020    Emory Clark (: 1940) presents for pre-operative evaluation assessment as requested by Dr. Mireles.  He requires evaluation and anesthesia risk assessment prior to undergoing surgery/procedure for treatment of Meniscus Repair.    COVID Test: Appointment is made.     Proposed Surgery/ Procedure: Meniscus Repair (RIGHT)  Date of Surgery/ Procedure: 2020  Time of Surgery/ Procedure: 0800  Hospital/Surgical Facility: [phone]    Primary Physician: Fabrice Humphreys  Type of Anesthesia Anticipated: to be determined    Patient has a Health Care Directive or Living Will:  YES     1. YES - Do you have a history of heart attack, stroke, stent, bypass or surgery on an artery in the head, neck, heart or legs? NSTEMI- around the time he had pneumonia  2. NO - Do you ever have any pain or discomfort in your chest?  3. NO - Do you have a history of Heart Failure?  4. NO - Are you troubled by shortness of breath when: walking on the level, up a slight hill or at night?  5. NO - Do you currently have a cold, bronchitis or other respiratory infection?  6. NO - Do you have a cough, shortness of breath or wheezing?  7. NO - Do you sometimes get pains in the calves of your legs when you walk?  8. NO - Do you or anyone in your family have previous history of blood clots?  9. NO - Do you or does anyone in your family have a serious bleeding problem such as prolonged bleeding following surgeries or cuts?  10. NO - Have you ever had problems with anemia or been told to take iron pills?  11. NO - Have you had any abnormal blood loss such as black, tarry or bloody stools, or abnormal vaginal bleeding?  12. YES - Have you ever had a blood transfusion? CLL history and has had transfusion in past for low hemoglobin  13. NO - Have you or any of your relatives ever  had problems with anesthesia?  14. NO - Do you have sleep apnea, excessive snoring or daytime drowsiness?  15. NO - Do you have any prosthetic heart valves?  16. NO - Do you have prosthetic joints?  17. NO - Is there any chance that you may be pregnant?      HPI:     HPI related to upcoming procedure: since August 2019 right knee, MRI showed mensicus damage, failed conservative treatment      See problem list for active medical problems.  Problems all longstanding and stable, except as noted/documented.  See ROS for pertinent symptoms related to these conditions.      MEDICAL HISTORY:     Patient Active Problem List    Diagnosis Date Noted     Diverticulosis of large intestine 09/13/2019     Priority: Medium     Overview:   Created by [employer] [facility] Annotation: Feb 4 2011  2:05PM - Hai Olvera: colonoscopy done   2/1/11    Replacement Utility updated for latest IMO load       Retention of urine 05/30/2019     Priority: Medium     History of acute renal failure 01/09/2018     Priority: Medium     Advance Care Planning 09/21/2017     Priority: Medium     Mild persistent asthma 02/18/2017     Priority: Medium     Immunosuppression (H) 11/29/2016     Priority: Medium     Anemia associated with chemotherapy 07/07/2015     Priority: Medium     NSTEMI (non-ST elevated myocardial infarction) (H)      Priority: Medium     Cardiomyopathy (H)      Priority: Medium     Pleural effusion 07/10/2014     Priority: Medium     Health Care Home 06/18/2014     Priority: Medium       Status:  Closed Pathway completed  Care Coordinator:  Kym Blackburn RN  [phone]  See Letters for [facility] Emergency Care Plan  Date:  August 26, 2014               Pneumonia 06/14/2014     Priority: Medium     Abnormal EKG 06/14/2014     Priority: Medium     Elevated brain natriuretic peptide (BNP) level 06/14/2014     Priority: Medium     CARDIOVASCULAR SCREENING; LDL GOAL LESS THAN 160 01/08/2014     Priority: Medium     Elevated prostate  specific antigen (PSA)      Priority: Medium     Overview:   Created by Conversion       Insomnia      Priority: Medium     GERD (gastroesophageal reflux disease)      Priority: Medium     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 Provider to review and confirm.    Overview:   Created by Conversion  LW Modifier:  Chemo q3 months  ; Leukemia Chronic Lymphocytic        Past Medical History:   Diagnosis Date     Basal cell carcinoma of face      CARDIOVASCULAR SCREENING; LDL GOAL LESS THAN 160 1/8/2014     CLL (chronic lymphocytic leukaemia)      Elevated PSA      GERD (gastroesophageal reflux disease)      Insomnia      Pleural effusion 7/10/2014     Past Surgical History:   Procedure Laterality Date     basal cell excision       CHOLECYSTECTOMY       LAPAROSCOPIC APPENDECTOMY       Current Outpatient Medications   Medication Sig Dispense Refill     acyclovir (ZOVIRAX) 400 MG tablet Take 400 mg by mouth       ASPIRIN NOT PRESCRIBED (INTENTIONAL) Please choose reason not prescribed, below 0 each 0     B Complex Vitamins (VITAMIN B COMPLEX PO) Take 1 tablet by mouth daily        ibrutinib (IMBRUVICA) 140 MG capsule Take by mouth daily       Multiple Vitamin (MULTIVITAMINS PO) Take 1 tablet by mouth daily        Multiple Vitamins-Minerals (PRESERVISION/LUTEIN) CAPS Take 1 capsule by mouth       tamsulosin (FLOMAX) 0.4 MG capsule Take 0.4 mg by mouth       traZODone (DESYREL) 50 MG tablet Take one or two at bedtime as needed for sleep 135 tablet 1     OTC products: no recent use of OTC ASA, NSAIDS or Steroids    Allergies   Allergen Reactions     Antihistamine [Altaryl]      Asa [Aspirin]      History of acute renal injury      Ethanolamine      PN: LW Reaction: Unknown Reaction      Latex Allergy: NO    Social History     Tobacco Use     Smoking status: Never Smoker     Smokeless tobacco: Never Used   Substance Use Topics     Alcohol use: Yes      Alcohol/week: 0.0 standard drinks     Comment: rare     History   Drug Use No       REVIEW OF SYSTEMS:   CONSTITUTIONAL: NEGATIVE for fever, chills, change in weight  INTEGUMENTARY/SKIN: NEGATIVE for worrisome rashes, moles or lesions  EYES: NEGATIVE for vision changes or irritation  ENT/MOUTH: NEGATIVE for ear, mouth and throat problems  RESP: NEGATIVE for significant cough or SOB  BREAST: NEGATIVE for masses, tenderness or discharge  CV: NEGATIVE for chest pain, palpitations or peripheral edema  GI: NEGATIVE for nausea, abdominal pain, heartburn, or change in bowel habits  :+ enlarged prostate, on Flomax  MUSCULOSKELETAL: + right knee pain  NEURO: NEGATIVE for weakness, dizziness or paresthesias  ENDOCRINE: NEGATIVE for temperature intolerance, skin/hair changes  HEME: NEGATIVE for bleeding problems  PSYCHIATRIC: NEGATIVE for changes in mood or affect    EXAM:   BP (!) 142/62 (BP Location: Right arm, Patient Position: Chair, Cuff Size: Adult Large)   Pulse 54   Temp 98  F (36.7  C) (Oral)   Resp 18   Ht 1.829 m (6')   Wt 100.2 kg (220 lb 14.4 oz)   SpO2 96%   BMI 29.96 kg/m      GENERAL APPEARANCE: healthy, alert and no distress     EYES: EOMI,  PERRL     HENT: ear canals and TM's normal and nose and mouth without ulcers or lesions     NECK: no adenopathy, no asymmetry, masses, or scars and thyroid normal to palpation     RESP: lungs clear to auscultation - no rales, rhonchi or wheezes     CV: regular rates and rhythm, normal S1 S2, no S3 or S4 and no murmur, click or rub     ABDOMEN:  soft, nontender, no HSM or masses and bowel sounds normal     MS: extremities normal- no gross deformities noted, no evidence of inflammation in joints, FROM in all extremities.     SKIN: no suspicious lesions or rashes     NEURO: Normal strength and tone, sensory exam grossly normal, mentation intact and speech normal     PSYCH: mentation appears normal. and affect normal/bright     LYMPHATICS: No cervical  adenopathy    DIAGNOSTICS:   EKG: NSR, bifascicular block    Recent Labs   Lab Test 09/11/18  1058 09/07/18  1224 01/09/18  0956 08/10/17  07/07/15  1140   HGB 14.7 14.7  --   --    < > 6.6*    321  --   --    < > 82*   INR  --   --   --   --   --  1.01   NA  --   --  140  --   --  140   POTASSIUM  --   --  4.6  --   --  4.0   CR  --   --  0.86 0.9   < > 0.99    < > = values in this interval not displayed.        IMPRESSION:   Reason for surgery/procedure: right knee partial medial meniscectomy    Diagnosis/reason for consult: right torn meniscus    The proposed surgical procedure is considered LOW risk.    REVISED CARDIAC RISK INDEX  The patient has the following serious cardiovascular risks for perioperative complications such as (MI, PE, VFib and 3  AV Block):  Coronary Artery Disease (MI, positive stress test, angina, Qs on EKG)  INTERPRETATION: 0 risks: Class II  low risk - 6% complication rate)    The patient has the following additional risks for perioperative complications:  No identified additional risks      ICD-10-CM    1. Preop general physical exam  Z01.818 EKG 12-lead complete w/read - Clinics   2. Tear of right meniscus as current injury, subsequent encounter  S83.206D    3. Mild persistent asthma without complication  J45.30    4. 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 History of non-ST elevation myocardial infarction (NSTEMI)  I25.2        RECOMMENDATIONS:     --Consult hospital rounder / IM to assist post-op medical management    --Patient is to SKIP all scheduled medications on the day of surgery EXCEPT for modifications listed below.  HOLD IMBRUVICA starting Saturday per Heme/Onc.    --Patient instructed to hold Aspirin, NSAIDs and Steroids 7 days prior to surgery, Tylenol okay for pain.    APPROVAL GIVEN to proceed with proposed procedure, without further diagnostic evaluation       Signed Electronically by: Diana Ibrahim PA-C    Copy of this evaluation report  is provided to requesting physician.    Neon Preop Guidelines    Revised Cardiac Risk Index

## 2020-05-21 ENCOUNTER — OFFICE VISIT (OUTPATIENT)
Dept: FAMILY MEDICINE | Facility: CLINIC | Age: 80
End: 2020-05-21
Payer: COMMERCIAL

## 2020-05-21 VITALS
WEIGHT: 220.9 LBS | HEIGHT: 72 IN | DIASTOLIC BLOOD PRESSURE: 62 MMHG | TEMPERATURE: 98 F | RESPIRATION RATE: 18 BRPM | OXYGEN SATURATION: 96 % | HEART RATE: 54 BPM | BODY MASS INDEX: 29.92 KG/M2 | SYSTOLIC BLOOD PRESSURE: 142 MMHG

## 2020-05-21 DIAGNOSIS — I25.2 HISTORY OF NON-ST ELEVATION MYOCARDIAL INFARCTION (NSTEMI): ICD-10-CM

## 2020-05-21 DIAGNOSIS — C91.11 CHRONIC LYMPHOID LEUKEMIA IN REMISSION (H): ICD-10-CM

## 2020-05-21 DIAGNOSIS — Z01.818 PREOP GENERAL PHYSICAL EXAM: Primary | ICD-10-CM

## 2020-05-21 DIAGNOSIS — J45.30 MILD PERSISTENT ASTHMA WITHOUT COMPLICATION: ICD-10-CM

## 2020-05-21 DIAGNOSIS — S83.206D TEAR OF RIGHT MENISCUS AS CURRENT INJURY, SUBSEQUENT ENCOUNTER: ICD-10-CM

## 2020-05-21 PROCEDURE — 93000 ELECTROCARDIOGRAM COMPLETE: CPT | Performed by: PHYSICIAN ASSISTANT

## 2020-05-21 PROCEDURE — 99215 OFFICE O/P EST HI 40 MIN: CPT | Performed by: PHYSICIAN ASSISTANT

## 2020-05-21 ASSESSMENT — MIFFLIN-ST. JEOR: SCORE: 1765

## 2020-12-20 ENCOUNTER — HEALTH MAINTENANCE LETTER (OUTPATIENT)
Age: 80
End: 2020-12-20

## 2021-01-13 ENCOUNTER — TELEPHONE (OUTPATIENT)
Dept: FAMILY MEDICINE | Facility: CLINIC | Age: 81
End: 2021-01-13

## 2021-01-13 ENCOUNTER — APPOINTMENT (OUTPATIENT)
Dept: GENERAL RADIOLOGY | Facility: CLINIC | Age: 81
End: 2021-01-13
Attending: EMERGENCY MEDICINE
Payer: COMMERCIAL

## 2021-01-13 ENCOUNTER — HOSPITAL ENCOUNTER (EMERGENCY)
Facility: CLINIC | Age: 81
Discharge: HOME | End: 2021-01-13
Attending: EMERGENCY MEDICINE | Admitting: EMERGENCY MEDICINE
Payer: COMMERCIAL

## 2021-01-13 ENCOUNTER — NURSE TRIAGE (OUTPATIENT)
Dept: NURSING | Facility: CLINIC | Age: 81
End: 2021-01-13

## 2021-01-13 VITALS
DIASTOLIC BLOOD PRESSURE: 79 MMHG | RESPIRATION RATE: 17 BRPM | OXYGEN SATURATION: 96 % | SYSTOLIC BLOOD PRESSURE: 121 MMHG | WEIGHT: 225.31 LBS | BODY MASS INDEX: 30.56 KG/M2 | HEART RATE: 55 BPM | TEMPERATURE: 98.4 F

## 2021-01-13 DIAGNOSIS — I48.92 ATRIAL FLUTTER WITH RAPID VENTRICULAR RESPONSE (H): ICD-10-CM

## 2021-01-13 LAB
ANION GAP SERPL CALCULATED.3IONS-SCNC: 4 MMOL/L (ref 3–14)
BUN SERPL-MCNC: 15 MG/DL (ref 7–30)
CALCIUM SERPL-MCNC: 9.3 MG/DL (ref 8.5–10.1)
CHLORIDE SERPL-SCNC: 106 MMOL/L (ref 94–109)
CO2 SERPL-SCNC: 28 MMOL/L (ref 20–32)
CREAT SERPL-MCNC: 0.87 MG/DL (ref 0.66–1.25)
ERYTHROCYTE [DISTWIDTH] IN BLOOD BY AUTOMATED COUNT: 14 % (ref 10–15)
GFR SERPL CREATININE-BSD FRML MDRD: 82 ML/MIN/{1.73_M2}
GLUCOSE SERPL-MCNC: 90 MG/DL (ref 70–99)
HCT VFR BLD AUTO: 51.6 % (ref 40–53)
HGB BLD-MCNC: 16.7 G/DL (ref 13.3–17.7)
INR PPP: 0.95 (ref 0.86–1.14)
MAGNESIUM SERPL-MCNC: 2.6 MG/DL (ref 1.6–2.3)
MCH RBC QN AUTO: 30 PG (ref 26.5–33)
MCHC RBC AUTO-ENTMCNC: 32.4 G/DL (ref 31.5–36.5)
MCV RBC AUTO: 93 FL (ref 78–100)
PLATELET # BLD AUTO: 267 10E9/L (ref 150–450)
POTASSIUM SERPL-SCNC: 3.8 MMOL/L (ref 3.4–5.3)
RBC # BLD AUTO: 5.57 10E12/L (ref 4.4–5.9)
SODIUM SERPL-SCNC: 138 MMOL/L (ref 133–144)
TROPONIN I SERPL-MCNC: <0.015 UG/L (ref 0–0.04)
TSH SERPL DL<=0.005 MIU/L-ACNC: 3.9 MU/L (ref 0.4–4)
WBC # BLD AUTO: 18.2 10E9/L (ref 4–11)

## 2021-01-13 PROCEDURE — 83735 ASSAY OF MAGNESIUM: CPT | Performed by: EMERGENCY MEDICINE

## 2021-01-13 PROCEDURE — 96376 TX/PRO/DX INJ SAME DRUG ADON: CPT

## 2021-01-13 PROCEDURE — 99291 CRITICAL CARE FIRST HOUR: CPT | Mod: 25

## 2021-01-13 PROCEDURE — 96372 THER/PROPH/DIAG INJ SC/IM: CPT | Performed by: EMERGENCY MEDICINE

## 2021-01-13 PROCEDURE — 250N000009 HC RX 250: Performed by: EMERGENCY MEDICINE

## 2021-01-13 PROCEDURE — 84484 ASSAY OF TROPONIN QUANT: CPT | Performed by: EMERGENCY MEDICINE

## 2021-01-13 PROCEDURE — 250N000011 HC RX IP 250 OP 636: Performed by: EMERGENCY MEDICINE

## 2021-01-13 PROCEDURE — 84443 ASSAY THYROID STIM HORMONE: CPT | Performed by: EMERGENCY MEDICINE

## 2021-01-13 PROCEDURE — 80048 BASIC METABOLIC PNL TOTAL CA: CPT | Performed by: EMERGENCY MEDICINE

## 2021-01-13 PROCEDURE — 71045 X-RAY EXAM CHEST 1 VIEW: CPT

## 2021-01-13 PROCEDURE — 258N000003 HC RX IP 258 OP 636: Performed by: EMERGENCY MEDICINE

## 2021-01-13 PROCEDURE — 96375 TX/PRO/DX INJ NEW DRUG ADDON: CPT

## 2021-01-13 PROCEDURE — 85610 PROTHROMBIN TIME: CPT | Performed by: EMERGENCY MEDICINE

## 2021-01-13 PROCEDURE — 93005 ELECTROCARDIOGRAM TRACING: CPT | Mod: 59

## 2021-01-13 PROCEDURE — 93005 ELECTROCARDIOGRAM TRACING: CPT | Mod: 76

## 2021-01-13 PROCEDURE — 85027 COMPLETE CBC AUTOMATED: CPT | Performed by: EMERGENCY MEDICINE

## 2021-01-13 PROCEDURE — 96365 THER/PROPH/DIAG IV INF INIT: CPT

## 2021-01-13 PROCEDURE — 250N000013 HC RX MED GY IP 250 OP 250 PS 637: Performed by: EMERGENCY MEDICINE

## 2021-01-13 RX ORDER — DILTIAZEM HYDROCHLORIDE 5 MG/ML
20 INJECTION INTRAVENOUS ONCE
Status: COMPLETED | OUTPATIENT
Start: 2021-01-13 | End: 2021-01-13

## 2021-01-13 RX ORDER — WARFARIN SODIUM 5 MG/1
5 TABLET ORAL ONCE
Status: COMPLETED | OUTPATIENT
Start: 2021-01-13 | End: 2021-01-13

## 2021-01-13 RX ORDER — ADENOSINE 3 MG/ML
6 INJECTION, SOLUTION INTRAVENOUS ONCE
Status: COMPLETED | OUTPATIENT
Start: 2021-01-13 | End: 2021-01-13

## 2021-01-13 RX ORDER — PROCAINAMIDE HYDROCHLORIDE 100 MG/ML
100 INJECTION INTRAMUSCULAR; INTRAVENOUS EVERY 5 MIN PRN
Status: DISCONTINUED | OUTPATIENT
Start: 2021-01-13 | End: 2021-01-13

## 2021-01-13 RX ORDER — METOPROLOL TARTRATE 50 MG
50 TABLET ORAL ONCE
Status: COMPLETED | OUTPATIENT
Start: 2021-01-13 | End: 2021-01-13

## 2021-01-13 RX ORDER — PROCAINAMIDE HYDROCHLORIDE 100 MG/ML
100 INJECTION INTRAMUSCULAR; INTRAVENOUS ONCE
Status: DISCONTINUED | OUTPATIENT
Start: 2021-01-13 | End: 2021-01-13

## 2021-01-13 RX ORDER — WARFARIN SODIUM 5 MG/1
5 TABLET ORAL DAILY
Qty: 14 TABLET | Refills: 0 | Status: SHIPPED | OUTPATIENT
Start: 2021-01-13 | End: 2021-01-22

## 2021-01-13 RX ADMIN — ADENOSINE 6 MG: 3 INJECTION, SOLUTION INTRAVENOUS at 19:21

## 2021-01-13 RX ADMIN — METOPROLOL TARTRATE 50 MG: 50 TABLET, FILM COATED ORAL at 19:33

## 2021-01-13 RX ADMIN — PROCAINAMIDE HYDROCHLORIDE 2 MG/MIN: 100 INJECTION INTRAMUSCULAR; INTRAVENOUS at 18:42

## 2021-01-13 RX ADMIN — ENOXAPARIN SODIUM 100 MG: 100 INJECTION SUBCUTANEOUS at 23:04

## 2021-01-13 RX ADMIN — Medication 1000 MG: at 18:10

## 2021-01-13 RX ADMIN — DILTIAZEM HYDROCHLORIDE 20 MG: 5 INJECTION INTRAVENOUS at 19:34

## 2021-01-13 RX ADMIN — WARFARIN SODIUM 5 MG: 5 TABLET ORAL at 23:00

## 2021-01-13 ASSESSMENT — ENCOUNTER SYMPTOMS
SHORTNESS OF BREATH: 0
NAUSEA: 0
PALPITATIONS: 0
DIZZINESS: 0
ARTHRALGIAS: 1

## 2021-01-13 NOTE — ED AVS SNAPSHOT
[facility]  [address]  [facility] [address]  Phone: [phone]  Fax: [phone]                                    Emory Clark   MRN: [id number]    Department: [facility]   Date of Visit: 1/13/2021           After Visit Summary Signature Page    I have received my discharge instructions, and my questions have been answered. I have discussed any challenges I see with this plan with the nurse or doctor.    ..........................................................................................................................................  Patient/Patient Representative Signature      ..........................................................................................................................................  Patient Representative Print Name and Relationship to Patient    ..................................................               ................................................  Date                                   Time    ..........................................................................................................................................  Reviewed by Signature/Title    ...................................................              ..............................................  Date                                               Time          22EPIC Rev 08/18

## 2021-01-13 NOTE — TELEPHONE ENCOUNTER
Called nurse triage //nurseline for racing heart, waxes and wanes,   Has immunotherapy and mulitple specialty meds some could be gi irritant: suggest uregent ER eval

## 2021-01-13 NOTE — ED NOTES
Pt with tachycardia off and on since December, states it started today around 3pm and hasn't subsided since. Denies dizziness, nausea, or SOB. ABC's intact, alert and oriented X3.

## 2021-01-13 NOTE — TELEPHONE ENCOUNTER
Checked with Dr. Humphreys for 2n level and then the  Patient has not seen recently. Dr. Humphreys did advise that he should go to ER as he has elevated heart rate without other reason. Advised Franko to go to ER and he agrees to the plan.   Reason for Disposition    Heart beating very rapidly (e.g., > 140 / minute) and present now (EXCEPTION: during exercise)    Additional Information    Negative: Difficulty breathing    Negative: Dizziness, lightheadedness, or weakness    Protocols used: HEART RATE AND HEARTBEAT [id number]

## 2021-01-13 NOTE — ED PROVIDER NOTES
History   Chief Complaint:  Tachycardia       HPI   Emory Clark is a 80 year old male with history of CLL, NSTEMI, cardiomyopathy on Imbruvica who presents with tachycardia. The patient says that he has been having intermittent tachycardia since December 2020 based on his smart watch alone.  There is often great variability in which his heart rate may be in the 50s and as high as 150.  He is asymptomatic with these episodes.  The patient says that his episode today started around 1500 and he only noticed because his watch and phone alerted him. The patient denies any chest pain, shortness of breath, nausea, palpitations, or dizziness. He endorses some left shoulder muscle soreness which worsens with movement but is not a novel discomfort for him.  He has no history of dysrhythmia.      Review of Systems   Respiratory: Negative for shortness of breath.    Cardiovascular: Negative for chest pain and palpitations.   Gastrointestinal: Negative for nausea.   Musculoskeletal: Positive for arthralgias.   Neurological: Negative for dizziness.   All other systems reviewed and are negative.    Allergies:  Antihistamine [Altaryl]  Asa [Aspirin]  Ethanolamine    Medications:  Desyrel  Imbruvica  Zovirax   Flomax  Desyrel  Acyclovir     Past Medical History:     Chronic lymphoid leukemia  GERD  Insomnia  Pneumonia   Pleural effusion  Cardiomyopathy  NSTEMI  Anemia  Immunosuppression  Asthma  Acute renal failure   Basal cell carcinoma     Past Surgical History:    Basal cell excision  Cholecystectomy  Laparoscopic appendectomy      Family History:    Parkinsons  CLL    Social History:  Patient presents to the ED alone.     Physical Exam     Patient Vitals for the past 24 hrs:   BP Temp Temp src Pulse Resp SpO2   01/13/21 2045 122/76 -- -- (!) 49 11 100 %   01/13/21 2040 122/76 -- -- 58 18 98 %   01/13/21 2035 -- -- -- 56 12 96 %   01/13/21 2030 110/66 -- -- 52 -- 99 %   01/13/21 2025 -- -- -- -- 13 99 %   01/13/21 2020  126/87 -- -- 58 16 99 %   01/13/21 2015 -- -- -- 64 19 98 %   01/13/21 2010 126/78 -- -- 53 8 100 %   01/13/21 2005 -- -- -- 59 16 99 %   01/13/21 2000 122/86 -- -- 75 (!) 0 95 %   01/13/21 1955 -- -- -- 92 17 98 %   01/13/21 1950 121/73 -- -- 78 8 98 %   01/13/21 1945 -- -- -- 71 (!) 6 96 %   01/13/21 1940 125/64 -- -- 95 20 99 %   01/13/21 1935 -- -- -- 141 18 96 %   01/13/21 1933 (!) 117/90 -- -- 141 -- --   01/13/21 1930 (!) 117/90 -- -- 141 (!) 7 95 %   01/13/21 1925 -- -- -- 141 11 96 %   01/13/21 1920 (!) 124/93 -- -- 140 10 --   01/13/21 1915 -- -- -- 140 (!) 46 95 %   01/13/21 1910 (!) 119/91 -- -- 140 (!) 7 94 %   01/13/21 1905 -- -- -- 138 9 95 %   01/13/21 1900 118/85 -- -- 137 11 94 %   01/13/21 1855 -- -- -- 137 17 93 %   01/13/21 1850 114/79 -- -- 134 (!) 7 95 %   01/13/21 1845 113/80 -- -- 131 9 98 %   01/13/21 1840 -- -- -- 128 9 94 %   01/13/21 1835 -- -- -- 129 (!) 7 96 %   01/13/21 1830 115/67 -- -- 130 15 94 %   01/13/21 1825 -- -- -- 133 9 98 %   01/13/21 1820 116/87 -- -- 128 14 95 %   01/13/21 1815 (!) 138/101 -- -- 140 10 95 %   01/13/21 1810 -- -- -- 146 16 96 %   01/13/21 1805 -- -- -- 147 13 95 %   01/13/21 1800 (!) 132/99 -- -- 147 12 95 %   01/13/21 1755 -- -- -- 147 9 98 %   01/13/21 1750 -- -- -- 147 17 100 %   01/13/21 1746 (!) 152/106 -- -- -- -- --   01/13/21 1745 (!) 152/106 98.4  F (36.9  C) Oral 148 18 98 %       Physical Exam      Eyes:    Conjunctiva normal  Neck:    Supple, no meningismus.     CV:     Tachycardic, regular rhythm.      No murmurs, rubs or gallops.       No unilateral leg swelling.       2+ radial pulses bilateral.       No lower extremity edema.  PULM:    Clear to auscultation bilateral.       No respiratory distress.      Good air exchange.     No rales or wheezing.     No stridor.  ABD:    Soft, non-tender, non-distended.       No pulsatile masses.       No rebound, guarding or rigidity.  MSK:     No gross deformity to all four extremities.   LYMPH:   No  cervical lymphadenopathy.  NEURO:   Alert. Good muscle tone, no atrophy.  Skin:    Warm, dry and intact.    Psych:    Mood is good and affect is appropriate.        Emergency Department Course     ECG:  ECG taken at 1740, ECG read at 1747  Wide QRS tachycardia  Right bundle branch block  Left anterior fascicular block  Bifascicular block  Moderate voltage criteria for LVH, may be normal variant  Cannot rule out septal infarct, age undetermined  Cannot rule out inferior infarct, age undetermined   Abnormal ECG  Rate 146 bpm. OK interval * ms. QRS duration 162 ms. QT/QTc 358/557 ms. P-R-T axes * -66 89.     ECG 2:  ECG taken at 1822, ECG read at 1826  Wide QRS tachycardia  Right bundle branch block  Left anterior fascicular block  Bifascicular block  Left ventricular hypertrophy with repolarization abnormality  Cannot rule out septal infarct, age undetermined  Abnormal ECG  Rate 133 bpm. OK interval * ms. QRS duration 154 ms. QT/QTc 380/565 ms. P-R-T axes * -61 83.     ECG 3:  ECG taken at 1837, ECG read at 1839  Sinus tachycardia  Right bundle branch block  Left anterior fascicular block  Bifascicular block  Left ventricular hypertrophy with repolarization abnormality  Cannot rule out septal infarct, age undetermined  Abnormal ECG  Rate 129 bpm. OK interval 112 ms. QRS duration 164 ms. QT/QTc 350/512 ms. P-R-T axes  96 -69 88    ECG 4:  ECG taken at 1854, ECG read at 1858  Sinus tachycardia  Right bundle branch block  Left anterior fascicular block  Bifascicular block  Left ventricular hypertrophy with repolarization abnormality  Cannot rule out septal infarct, age undetermined  Abnormal ECG  Rate 136 bpm. OK interval 118 ms. QRS duration 154 ms. QT/QTc 306/460 ms. P-R-T axes 91 -69 87.     ECG 5:  ECG taken at 1907, ECG read at 1916  Sinus tachycardia and atrial flutter   Right bundle branch block  Left anterior fascicular block  Bifascicular block  Left ventricular hypertrophy with repolarization  abnormality  Cannot rule out septal infarct, age undetermined  Abnormal ECG  Rate 137 bpm. NM interval 184 ms. QRS duration 152 ms. QT/QTc 334/504 ms. P-R-T axes 97 -67 78.     ECG 6:  ECG taken at 2010, ECG read at 2021  Sinus rhythm with marked sinus arrhythmia   Right bundle branch block  Left anterior fascicular block  Bifascicular block  Moderate voltage criteria for LVH, may be normal variant   Cannot rule out septal infarct, age undetermined  Abnormal ECG  Rate 62 bpm. NM interval 184 ms. QRS duration 160 ms. QT/QTc 442/448 ms. P-R-T axes 24 -58 30.     Imaging:  XR Chest Port 1 View  Negative chest.  Reading per radiology     Laboratory:    CBC: WBC 18.2 (H), HGB 16.7,   BMP: AWNL (Creatinine 0.87)  Troponin (Collected 1751): <0.015  TSH with free T4 reflex: 3.90  Magnesium: 2.6 (H)   INR: 0.95    Emergency Department Course:    Reviewed:  1747 I reviewed the patient's nursing notes, vitals, past medical records, Care Everywhere.        Assessments:  1748 I performed an exam of the patient as documented above.   1835 Patient rechecked and updated.    2112 Patient rechecked and updated.      Interventions:  1810 Pronestyl bolus 1000 mg IV  1921 Adenocard 6 mg IV  1933 Lopressor 50 mg IV   Cardizem 20 mg IV   2300 Coumadin 1 tablet Oral   2304 Lovenox 100 mg subcutaneous      Disposition:  The patient was discharged to home.       Impression & Plan       Medical Decision Making:    Emory Clark is a 80 year old male who presents to the emergency department today for evaluation of asymptomatic tachycardia.  EKG revealed a wide-complex regular tachycardia between 140-160.  Patient was hemodynamically stable and asymptomatic.  He does have a baseline right bundle branch block and LAFB but no history of dysrhythmia.  I was unable unable to rule out stable ventricular tachycardia although certainly could have represented atrial flutter with rapid ventricular response versus SVT with aberrancy.  Given his  age and medical problems, I opted to treat for stable ventricular tachycardia with procainamide.  I did not want to provide AV blanco blockade agents such as adenosine or metoprolol in the event that this represented ventricular tachycardia or SVT with accessory pathway which could lead to hemodynamic collapse.    Procainamide was infused in which serial EKGs were done to ensure there was no QRS prolongation.  Fortunately QRS did not prolong.  He did not convert with procainamide but his heart rate slowed down enough where serial EKGs revealed distinct P waves thus ruling out ventricular tachycardia.  At this point, this represented sinus tachycardia without obvious inciting source versus atrial flutter with rapid ventricular response.    Since VT had been ruled out, patient was then given adenosine in which we could visualize the atrial flutter waves and formal diagnosis of atrial flutter with rapid ventricular response.  Patient was given diltiazem with immediate rate control and eventually spontaneously converted to normal sinus rhythm revealing bifascicular block.    He is safe for discharge home.  His CHADs-Vasc score is 3 thus requires anticoagulation.  He is on a oral chemotherapeutic agent Imbruvica in which there is significant interaction with novel oral anticoagulants including Xarelto and Eliquis thus will need to be initiated on Lovenox bridging to Coumadin.  Patient given first dose in the ED.  Outpatient cardiology referral order placed.  Patient to urgently follow-up with cardiology and also update his primary care physician and oncologist given the findings today.    Total critical care time: 60 minutes         Diagnosis:    ICD-10-CM    1. Atrial fibrillation with rapid ventricular response (H)  I48.91        Discharge Medications:  Discharge Medication List as of 1/13/2021 11:10 PM      START taking these medications    Details   enoxaparin ANTICOAGULANT (LOVENOX) 100 MG/ML syringe Inject 1 mL (100  mg) Subcutaneous 2 times daily for 7 days, Disp-14 mL, R-0, Local Print      warfarin ANTICOAGULANT (COUMADIN) 5 MG tablet Take 1 tablet (5 mg) by mouth daily for 14 days, Disp-14 tablet, R-0, Local Print             Scribe Disclosure:  I, Akhil Hyatt, am serving as a scribe at 5:47 PM on 1/13/2021 to document services personally performed by Brendan Isbell MD based on my observations and the provider's statements to me.          Brendan Isbell MD  01/13/21 4052

## 2021-01-14 ENCOUNTER — TELEPHONE (OUTPATIENT)
Dept: FAMILY MEDICINE | Facility: CLINIC | Age: 81
End: 2021-01-14

## 2021-01-14 LAB
INTERPRETATION ECG - MUSE: NORMAL

## 2021-01-14 NOTE — ED NOTES
RN educated patient on how to administer Lovenox shots. Pt verbalized understanding and taught back how to administer. Pt took pictures of the administration education on his cellphone to reference at home

## 2021-01-14 NOTE — ED NOTES
[facility]  ED Nurse Handoff Report    Emory Clark is a 80 year old male   ED Chief complaint: Tachycardia  . ED Diagnosis:   Final diagnoses:   Atrial fibrillation with rapid ventricular response (H)     Allergies:   Allergies   Allergen Reactions     Antihistamine [Altaryl]      Asa [Aspirin]      History of acute renal injury      Ethanolamine      PN: LW Reaction: Unknown Reaction       Code Status: Full Code  Activity level - Baseline/Home:  Independent. Activity Level - Current:   Stand by Assist. Lift room needed: No. Bariatric: No   Needed: No   Isolation: none. Infection: Not Applicable.     Vital Signs:   Vitals:    01/13/21 1925 01/13/21 1930 01/13/21 1933 01/13/21 1935   BP:  (!) 117/90 (!) 117/90    Pulse: 141 141 141 141   Resp: 11   18   Temp:       TempSrc:       SpO2: 96% 95%  96%       Cardiac Rhythm:  ,      Pain level:    Patient confused: No. Patient Falls Risk: Yes.   Elimination Status: Has voided   Patient Report - Initial Complaint: tachycardia. Focused Assessment: Pt aox4, ABCs intact. Pt states that today he started having a rapid heart rate around 1500. Pt states that his watch alerted him. Pt states that this has been going on intermittently since December. Denies any chest pain, SOB, or dizziness.   Tests Performed: blood work, ekg. Abnormal Results:   Labs Ordered and Resulted from Time of ED Arrival Up to the Time of Departure from the ED   CBC WITH PLATELETS - Abnormal; Notable for the following components:       Result Value    WBC 18.2 (*)     All other components within normal limits   MAGNESIUM - Abnormal; Notable for the following components:    Magnesium 2.6 (*)     All other components within normal limits   BASIC METABOLIC PANEL   TROPONIN I   TSH WITH FREE T4 REFLEX   SARS-COV-2 (COVID-19) VIRUS RT-PCR   PERIPHERAL IV CATHETER   CARDIAC CONTINUOUS MONITORING   PULSE OXIMETRY NURSING     XR Chest Port 1 View    (Results Pending)     .    Treatments provided: fluids, cardizem, procainamide, adenosine  Family Comments: no family present  OBS brochure/video discussed/provided to patient:  N/A  ED Medications:   Medications   procainamide (PRONESTYL) 2 g in sodium chloride 0.9 % 250 mL (0 mg/min Intravenous Stopped 1/13/21 1911)   procainamide (PRONESTYL) bolus dose from infusion pump 1,000 mg (1,000 mg Intravenous Given 1/13/21 1810)   adenosine (ADENOCARD) injection 6 mg (6 mg Intravenous Given 1/13/21 1921)   diltiazem (CARDIZEM) injection 20 mg (20 mg Intravenous Given 1/13/21 1934)   metoprolol tartrate (LOPRESSOR) tablet 50 mg (50 mg Oral Given 1/13/21 1933)     Drips infusing:  No  For the majority of the shift, the patient's behavior Green. Interventions performed were frequent rounding and cardiac checks.    Sepsis treatment initiated: No     Patient tested for COVID 19 prior to admission: YES    ED Nurse Name/Phone Number: Magui Adam RN,   7:37 PM

## 2021-01-14 NOTE — TELEPHONE ENCOUNTER
Reason for call:  Other   Patient called regarding (reason for call): appointment  Additional comments: Per patient: I was seen at the hospital and was advised to have a follow up appt within the next two days, was there anyway I can be squeezed into your schedule this week? Or Monday? I'm scheduled for 1/20/21 at 9:30am    Phone number to reach patient:  Home number on file [phone] (home)    Best Time:  Anytime    Can we leave a detailed message on this number?  YES    Travel screening: Not Applicable

## 2021-01-14 NOTE — DISCHARGE INSTRUCTIONS
Today you were found to have a heart rhythm problem called atrial flutter.  You required medications to slow down your heart rate and ultimately went back to a normal rhythm.  Because of your underlying medical problems, you need to be placed on blood thinners to prevent stroke.    We are starting you on a medication called warfarin but it takes time for that medicine to take effect.  While awaiting for that medicine to take effect, you will need shots called Lovenox.    I have ordered outpatient follow-up with a cardiologist for further treatment of this heart rhythm issue.

## 2021-01-15 ENCOUNTER — NURSE TRIAGE (OUTPATIENT)
Dept: FAMILY MEDICINE | Facility: CLINIC | Age: 81
End: 2021-01-15

## 2021-01-15 ENCOUNTER — HOSPITAL ENCOUNTER (OUTPATIENT)
Facility: CLINIC | Age: 81
Setting detail: OBSERVATION
Discharge: HOME | End: 2021-01-16
Attending: EMERGENCY MEDICINE | Admitting: INTERNAL MEDICINE
Payer: COMMERCIAL

## 2021-01-15 DIAGNOSIS — I48.92 ATRIAL FLUTTER, PAROXYSMAL (H): Primary | ICD-10-CM

## 2021-01-15 DIAGNOSIS — J90 PLEURAL EFFUSION: ICD-10-CM

## 2021-01-15 DIAGNOSIS — I48.0 PAROXYSMAL ATRIAL FIBRILLATION (H): ICD-10-CM

## 2021-01-15 LAB
ANION GAP SERPL CALCULATED.3IONS-SCNC: 4 MMOL/L (ref 3–14)
BASOPHILS # BLD AUTO: 0.1 10E9/L (ref 0–0.2)
BASOPHILS NFR BLD AUTO: 0.7 %
BUN SERPL-MCNC: 18 MG/DL (ref 7–30)
CALCIUM SERPL-MCNC: 9.2 MG/DL (ref 8.5–10.1)
CHLORIDE SERPL-SCNC: 107 MMOL/L (ref 94–109)
CO2 SERPL-SCNC: 26 MMOL/L (ref 20–32)
CREAT SERPL-MCNC: 0.86 MG/DL (ref 0.66–1.25)
DIFFERENTIAL METHOD BLD: ABNORMAL
EOSINOPHIL # BLD AUTO: 0.3 10E9/L (ref 0–0.7)
EOSINOPHIL NFR BLD AUTO: 1.9 %
ERYTHROCYTE [DISTWIDTH] IN BLOOD BY AUTOMATED COUNT: 13.9 % (ref 10–15)
GFR SERPL CREATININE-BSD FRML MDRD: 83 ML/MIN/{1.73_M2}
GLUCOSE SERPL-MCNC: 91 MG/DL (ref 70–99)
HCT VFR BLD AUTO: 49.4 % (ref 40–53)
HGB BLD-MCNC: 16.5 G/DL (ref 13.3–17.7)
IMM GRANULOCYTES # BLD: 0.1 10E9/L (ref 0–0.4)
IMM GRANULOCYTES NFR BLD: 0.6 %
INR PPP: 1.1 (ref 0.86–1.14)
LABORATORY COMMENT REPORT: NORMAL
LYMPHOCYTES # BLD AUTO: 5.6 10E9/L (ref 0.8–5.3)
LYMPHOCYTES NFR BLD AUTO: 33.3 %
MCH RBC QN AUTO: 30.3 PG (ref 26.5–33)
MCHC RBC AUTO-ENTMCNC: 33.4 G/DL (ref 31.5–36.5)
MCV RBC AUTO: 91 FL (ref 78–100)
MONOCYTES # BLD AUTO: 1.2 10E9/L (ref 0–1.3)
MONOCYTES NFR BLD AUTO: 7.1 %
NEUTROPHILS # BLD AUTO: 9.4 10E9/L (ref 1.6–8.3)
NEUTROPHILS NFR BLD AUTO: 56.4 %
NRBC # BLD AUTO: 0 10*3/UL
NRBC BLD AUTO-RTO: 0 /100
NT-PROBNP SERPL-MCNC: 251 PG/ML (ref 0–1800)
PLATELET # BLD AUTO: 243 10E9/L (ref 150–450)
PLATELET # BLD EST: ABNORMAL 10*3/UL
POTASSIUM SERPL-SCNC: 4 MMOL/L (ref 3.4–5.3)
RBC # BLD AUTO: 5.44 10E12/L (ref 4.4–5.9)
RBC MORPH BLD: ABNORMAL
SARS-COV-2 RNA RESP QL NAA+PROBE: NEGATIVE
SODIUM SERPL-SCNC: 137 MMOL/L (ref 133–144)
SPECIMEN SOURCE: NORMAL
TROPONIN I SERPL-MCNC: <0.015 UG/L (ref 0–0.04)
VARIANT LYMPHS BLD QL SMEAR: PRESENT
WBC # BLD AUTO: 16.7 10E9/L (ref 4–11)

## 2021-01-15 PROCEDURE — C9803 HOPD COVID-19 SPEC COLLECT: HCPCS

## 2021-01-15 PROCEDURE — 84484 ASSAY OF TROPONIN QUANT: CPT | Performed by: EMERGENCY MEDICINE

## 2021-01-15 PROCEDURE — 96376 TX/PRO/DX INJ SAME DRUG ADON: CPT

## 2021-01-15 PROCEDURE — G0378 HOSPITAL OBSERVATION PER HR: HCPCS

## 2021-01-15 PROCEDURE — 250N000009 HC RX 250: Performed by: EMERGENCY MEDICINE

## 2021-01-15 PROCEDURE — 93005 ELECTROCARDIOGRAM TRACING: CPT

## 2021-01-15 PROCEDURE — 80048 BASIC METABOLIC PNL TOTAL CA: CPT | Performed by: EMERGENCY MEDICINE

## 2021-01-15 PROCEDURE — 96372 THER/PROPH/DIAG INJ SC/IM: CPT | Mod: XS | Performed by: INTERNAL MEDICINE

## 2021-01-15 PROCEDURE — 83880 ASSAY OF NATRIURETIC PEPTIDE: CPT | Performed by: EMERGENCY MEDICINE

## 2021-01-15 PROCEDURE — 250N000013 HC RX MED GY IP 250 OP 250 PS 637: Performed by: EMERGENCY MEDICINE

## 2021-01-15 PROCEDURE — 96374 THER/PROPH/DIAG INJ IV PUSH: CPT

## 2021-01-15 PROCEDURE — 99220 PR INITIAL OBSERVATION CARE,LEVEL III: CPT | Performed by: INTERNAL MEDICINE

## 2021-01-15 PROCEDURE — 85025 COMPLETE CBC W/AUTO DIFF WBC: CPT | Performed by: EMERGENCY MEDICINE

## 2021-01-15 PROCEDURE — 87635 SARS-COV-2 COVID-19 AMP PRB: CPT | Performed by: EMERGENCY MEDICINE

## 2021-01-15 PROCEDURE — 250N000013 HC RX MED GY IP 250 OP 250 PS 637: Performed by: INTERNAL MEDICINE

## 2021-01-15 PROCEDURE — 85610 PROTHROMBIN TIME: CPT | Performed by: EMERGENCY MEDICINE

## 2021-01-15 PROCEDURE — 99285 EMERGENCY DEPT VISIT HI MDM: CPT | Mod: 25

## 2021-01-15 PROCEDURE — 250N000011 HC RX IP 250 OP 636: Performed by: INTERNAL MEDICINE

## 2021-01-15 PROCEDURE — 93005 ELECTROCARDIOGRAM TRACING: CPT | Mod: 76

## 2021-01-15 RX ORDER — MULTIVIT WITH MINERALS/LUTEIN
1000 TABLET ORAL EVERY OTHER DAY
COMMUNITY

## 2021-01-15 RX ORDER — ACETAMINOPHEN 650 MG/1
650 SUPPOSITORY RECTAL EVERY 4 HOURS PRN
Status: DISCONTINUED | OUTPATIENT
Start: 2021-01-15 | End: 2021-01-16 | Stop reason: HOSPADM

## 2021-01-15 RX ORDER — DILTIAZEM HYDROCHLORIDE 5 MG/ML
10 INJECTION INTRAVENOUS ONCE
Status: DISCONTINUED | OUTPATIENT
Start: 2021-01-15 | End: 2021-01-16

## 2021-01-15 RX ORDER — AMOXICILLIN 250 MG
1 CAPSULE ORAL 2 TIMES DAILY PRN
Status: DISCONTINUED | OUTPATIENT
Start: 2021-01-15 | End: 2021-01-16 | Stop reason: HOSPADM

## 2021-01-15 RX ORDER — METOPROLOL TARTRATE 1 MG/ML
5 INJECTION, SOLUTION INTRAVENOUS ONCE
Status: COMPLETED | OUTPATIENT
Start: 2021-01-15 | End: 2021-01-15

## 2021-01-15 RX ORDER — VITAMIN B COMPLEX
50 TABLET ORAL EVERY EVENING
COMMUNITY

## 2021-01-15 RX ORDER — ONDANSETRON 2 MG/ML
4 INJECTION INTRAMUSCULAR; INTRAVENOUS EVERY 6 HOURS PRN
Status: DISCONTINUED | OUTPATIENT
Start: 2021-01-15 | End: 2021-01-16 | Stop reason: HOSPADM

## 2021-01-15 RX ORDER — ACETAMINOPHEN 325 MG/1
650 TABLET ORAL EVERY 4 HOURS PRN
Status: DISCONTINUED | OUTPATIENT
Start: 2021-01-15 | End: 2021-01-16 | Stop reason: HOSPADM

## 2021-01-15 RX ORDER — POLYETHYLENE GLYCOL 3350 17 G/17G
17 POWDER, FOR SOLUTION ORAL DAILY PRN
Status: DISCONTINUED | OUTPATIENT
Start: 2021-01-15 | End: 2021-01-16 | Stop reason: HOSPADM

## 2021-01-15 RX ORDER — METOPROLOL TARTRATE 50 MG
50 TABLET ORAL ONCE
Status: COMPLETED | OUTPATIENT
Start: 2021-01-15 | End: 2021-01-15

## 2021-01-15 RX ORDER — TRAZODONE HYDROCHLORIDE 50 MG/1
50 TABLET, FILM COATED ORAL AT BEDTIME
Status: DISCONTINUED | OUTPATIENT
Start: 2021-01-15 | End: 2021-01-16 | Stop reason: HOSPADM

## 2021-01-15 RX ORDER — ZINC GLUCONATE 50 MG
50 TABLET ORAL EVERY OTHER DAY
COMMUNITY

## 2021-01-15 RX ORDER — WARFARIN SODIUM 2.5 MG/1
2.5 TABLET ORAL ONCE
Status: COMPLETED | OUTPATIENT
Start: 2021-01-15 | End: 2021-01-15

## 2021-01-15 RX ORDER — METOPROLOL TARTRATE 1 MG/ML
2.5 INJECTION, SOLUTION INTRAVENOUS EVERY 4 HOURS PRN
Status: DISCONTINUED | OUTPATIENT
Start: 2021-01-15 | End: 2021-01-16 | Stop reason: HOSPADM

## 2021-01-15 RX ORDER — TRAZODONE HYDROCHLORIDE 50 MG/1
50 TABLET, FILM COATED ORAL AT BEDTIME
COMMUNITY

## 2021-01-15 RX ORDER — ONDANSETRON 4 MG/1
4 TABLET, ORALLY DISINTEGRATING ORAL EVERY 6 HOURS PRN
Status: DISCONTINUED | OUTPATIENT
Start: 2021-01-15 | End: 2021-01-16 | Stop reason: HOSPADM

## 2021-01-15 RX ORDER — AMOXICILLIN 250 MG
2 CAPSULE ORAL 2 TIMES DAILY PRN
Status: DISCONTINUED | OUTPATIENT
Start: 2021-01-15 | End: 2021-01-16 | Stop reason: HOSPADM

## 2021-01-15 RX ORDER — TAMSULOSIN HYDROCHLORIDE 0.4 MG/1
0.4 CAPSULE ORAL EVERY EVENING
COMMUNITY

## 2021-01-15 RX ADMIN — METOPROLOL TARTRATE 50 MG: 50 TABLET, FILM COATED ORAL at 18:18

## 2021-01-15 RX ADMIN — METOPROLOL TARTRATE 5 MG: 5 INJECTION INTRAVENOUS at 17:42

## 2021-01-15 RX ADMIN — ENOXAPARIN SODIUM 100 MG: 100 INJECTION SUBCUTANEOUS at 22:15

## 2021-01-15 RX ADMIN — TRAZODONE HYDROCHLORIDE 50 MG: 50 TABLET ORAL at 22:15

## 2021-01-15 RX ADMIN — WARFARIN SODIUM 2.5 MG: 2.5 TABLET ORAL at 22:15

## 2021-01-15 RX ADMIN — METOPROLOL TARTRATE 5 MG: 5 INJECTION INTRAVENOUS at 18:19

## 2021-01-15 ASSESSMENT — ENCOUNTER SYMPTOMS: PALPITATIONS: 0

## 2021-01-15 ASSESSMENT — MIFFLIN-ST. JEOR: SCORE: 1752.74

## 2021-01-15 NOTE — ED TRIAGE NOTES
"Seen here Wednesday for same thing. Fast heart rate and palpitations. Pt says they started his on Coumadin and \"something else I don't remember.\" HR 150s  "

## 2021-01-15 NOTE — TELEPHONE ENCOUNTER
Pt states his Heart rate is in the 130-155s.   He monitors with O2 saturation on finger, and uses his fitbit and phone ariana for pulse. For about the last 2 hours.   Oxygen is 96%.     Started warfarin and Lovenox a few days ago. In the ED. Has not heard back from Heart clinic yet for appt. Has appt for PCP next week.     No chest pain, no lightheadedness or dizziness.     Advised ED as he has new atrial flutter with rapid vent response per his last ED visit on 1/13.     He agrees and his wife will drive him.       Reason for Disposition    Heart beating very rapidly (e.g., > 140 / minute) and present now (EXCEPTION: during exercise)    Additional Information    Negative: Passed out (i.e., fainted, collapsed and was not responding)    Negative: Shock suspected (e.g., cold/pale/clammy skin, too weak to stand, low BP, rapid pulse)    Negative: Difficult to awaken or acting confused (e.g., disoriented, slurred speech)    Negative: Visible sweat on face or sweat dripping down face    Negative: Unable to walk, or can only walk with assistance (e.g., requires support)    Negative: Received SHOCK from implantable cardiac defibrillator and has persisting symptoms (i.e., palpitations, lightheadedness)    Negative: Sounds like a life-threatening emergency to the triager    Negative: Chest pain    Negative: Difficulty breathing    Negative: Dizziness, lightheadedness, or weakness    Protocols used: HEART RATE AND HEARTBEAT [id number]

## 2021-01-15 NOTE — ED PROVIDER NOTES
History   Chief Complaint:  Tachycardia       The history is provided by the patient and medical records.      Emory Clark is a 80 year old male with history of NSTEMI and cardiomyopathy who presents with tachycardia. The patient was seen on 1/13 for tachycardia as well with a similar presentation, see work-up below. At this ED visit he was started on Lovenox IM and Coumadin. His episodes are asymptomatic and he only catches this when he looks at his heart rate on his phone, pulse monitor, and watch. When he checked this today he noticed his heart rate was between 150-152. He denies chest pain or palpitations at this time. The patient denies taking Metoprolol.    Visit from 1/13/21:  Imaging:  XR Chest Port 1 View  Negative chest.  Reading per radiology      Laboratory:  CBC: WBC 18.2 (H), HGB 16.7,   BMP: AWNL (Creatinine 0.87)  Troponin (Collected 1751): <0.015  TSH with free T4 reflex: 3.90  Magnesium: 2.6 (H)   INR: 0.95    Review of Systems   Cardiovascular: Negative for chest pain and palpitations.   All other systems reviewed and are negative.      Allergies:  Antihistamine [Altaryl]  Asa [Aspirin]  Ethanolamine     Medications:  Desyrel  Imbruvica  Zovirax   Flomax  Desyrel  Acyclovir      Past Medical History:     Chronic lymphoid leukemia  GERD  Insomnia  Pneumonia   Pleural effusion  Cardiomyopathy  NSTEMI  Anemia  Immunosuppression  Asthma  Acute renal failure   Basal cell carcinoma      Past Surgical History:    Basal cell excision  Cholecystectomy  Laparoscopic appendectomy       Family History:    Parkinsons  CLL    Social History:  Presents to the ED alone.    Physical Exam     Patient Vitals for the past 24 hrs:   BP Temp Temp src Pulse Resp SpO2   01/15/21 1845 (!) 117/91 -- -- 105 20 95 %   01/15/21 1830 126/84 -- -- 94 12 95 %   01/15/21 1818 (!) 128/91 -- -- 105 -- --   01/15/21 1815 -- -- -- 105 15 97 %   01/15/21 1800 -- -- -- 108 21 --   01/15/21 1750 -- -- -- 108 13 96 %    01/15/21 1745 (!) 141/97 -- -- 122 26 94 %   01/15/21 1730 (!) 124/105 -- -- 117 14 95 %   01/15/21 1715 (!) 114/92 -- -- 129 18 96 %   01/15/21 1655 (!) 134/102 96.4  F (35.8  C) Temporal 154 18 96 %       Physical Exam  Vitals signs reviewed.   HENT:      Right Ear: Tympanic membrane normal.      Left Ear: Tympanic membrane normal.      Mouth/Throat:      Mouth: Mucous membranes are moist.   Eyes:      General: No scleral icterus.     Conjunctiva/sclera: Conjunctivae normal.   Cardiovascular:      Rate and Rhythm: Tachycardia present. Rhythm irregular.   Pulmonary:      Effort: Pulmonary effort is normal.   Abdominal:      General: Abdomen is flat.   Skin:     General: Skin is warm.      Capillary Refill: Capillary refill takes less than 2 seconds.   Neurological:      General: No focal deficit present.      Mental Status: He is alert.   Psychiatric:         Mood and Affect: Mood normal.         Emergency Department Course     ECG:  Indication: Tachycardia  Time: 1647  Vent. Rate 150 bpm. NY interval *. QRS duration 150. QT/QTc 346/546. P-R-T axis * -62 92. Wide QRS tachycardia. Right bundle branch block. Left anterior fascicular block. *Bifascicular block* Left ventricular hypertrophy with repolarization abnormality. Cannot rule out Septal infarct, age undetermined. Abnormal ECG. Read time: 1649    ECG #2:   Time: 1806  Vent. Rate 103 bpm. NY interval *. QRS duration 146. QT/QTc 370/484. P-R-T axis * -60 72. Atrial fibrillation with rapid ventricular response with premature ventricular or aberrantly conducted complexes. Right bundle branch block. Left anterior fascicular block. *Bifascicular block*. Minimal voltage criteria for LVH, may be normal variant. Abnormal ECG. Read time: 1811    Laboratory:   CBC: WBC: 16.7 (H), HGB: 16.5, PLT: 243    BMP: Glucose 91 o/w WNL (Creatinine: 0.86)    Troponin (1744): <0.015    INR: 1.10    BNP: 251    Asymptomatic COVID-19 PCR: Pending     Emergency Department  Course:    Reviewed:  I reviewed the patient's nursing notes, vitals, past medical records, Care Everywhere.     Assessments:  1704 Initial examination of the patient.     1904 Updated and rechecked the patient     Consults:   1859 Spoke with Dr. Moore, Hospitalist.     Interventions:  1742 Lopressor 5 mg IV  1818 Lopressor 50 mg PO  1819 Lopressor 5 mg IV    Disposition:  The patient was admitted to the hospital under the care of Dr. Moore.     Impression & Plan     Covid-19  Emory Clark was evaluated during a global COVID-19 pandemic, which necessitated consideration that the patient might be at risk for infection with the SARS-CoV-2 virus that causes COVID-19.   Applicable protocols for evaluation were followed during the patient's care.   COVID-19 was considered as part of the patient's evaluation. The plan for testing is:  a test was obtained during this visit.    Medical Decision Making:  Patient presents with rapid pulse on arrival patient is tachycardic and irregular.  Prior EKG suggests atrial flutter today's EKG suggest more A. fib.  Patient refuses Cardizem due to interactions with his chemotherapy medications IV beta-blockers were given with some improvement in heart rate.  Patient was offered cardioversion discharge and follow-up with cardiology as an outpatient or admission this is a patient second visit this week for ongoing irregular rhythm feel comfortable with observation admission for confirmation of rate control and likely cardiology consultation.  Care is discussed with the hospitalist will admit on observation.      Diagnosis:    ICD-10-CM    1. Paroxysmal atrial fibrillation (H)  I48.0        Scribe Disclosure:  I, Jacques Gonzalez, am serving as a scribe at 5:05 PM on 1/15/2021 to document services personally performed by Rony Dias MD based on my observations and the provider's statements to me.            Rony Dias MD  01/19/21 0012

## 2021-01-16 ENCOUNTER — APPOINTMENT (OUTPATIENT)
Dept: CARDIOLOGY | Facility: CLINIC | Age: 81
End: 2021-01-16
Attending: INTERNAL MEDICINE
Payer: COMMERCIAL

## 2021-01-16 VITALS
WEIGHT: 214.6 LBS | HEART RATE: 56 BPM | TEMPERATURE: 96.4 F | DIASTOLIC BLOOD PRESSURE: 64 MMHG | BODY MASS INDEX: 29.07 KG/M2 | SYSTOLIC BLOOD PRESSURE: 123 MMHG | OXYGEN SATURATION: 93 % | HEIGHT: 72 IN | RESPIRATION RATE: 18 BRPM

## 2021-01-16 PROBLEM — I48.92 ATRIAL FLUTTER, PAROXYSMAL (H): Status: ACTIVE | Noted: 2021-01-16

## 2021-01-16 LAB
ANION GAP SERPL CALCULATED.3IONS-SCNC: 5 MMOL/L (ref 3–14)
BUN SERPL-MCNC: 15 MG/DL (ref 7–30)
CALCIUM SERPL-MCNC: 8.7 MG/DL (ref 8.5–10.1)
CHLORIDE SERPL-SCNC: 109 MMOL/L (ref 94–109)
CO2 SERPL-SCNC: 25 MMOL/L (ref 20–32)
CREAT SERPL-MCNC: 0.74 MG/DL (ref 0.66–1.25)
ERYTHROCYTE [DISTWIDTH] IN BLOOD BY AUTOMATED COUNT: 14 % (ref 10–15)
GFR SERPL CREATININE-BSD FRML MDRD: 88 ML/MIN/{1.73_M2}
GLUCOSE SERPL-MCNC: 84 MG/DL (ref 70–99)
HCT VFR BLD AUTO: 48.2 % (ref 40–53)
HGB BLD-MCNC: 15.7 G/DL (ref 13.3–17.7)
INR PPP: 1.21 (ref 0.86–1.14)
INTERPRETATION ECG - MUSE: NORMAL
LACTATE BLD-SCNC: 0.7 MMOL/L (ref 0.7–2)
MCH RBC QN AUTO: 29.9 PG (ref 26.5–33)
MCHC RBC AUTO-ENTMCNC: 32.6 G/DL (ref 31.5–36.5)
MCV RBC AUTO: 92 FL (ref 78–100)
PLATELET # BLD AUTO: 199 10E9/L (ref 150–450)
POTASSIUM SERPL-SCNC: 3.7 MMOL/L (ref 3.4–5.3)
RBC # BLD AUTO: 5.25 10E12/L (ref 4.4–5.9)
SODIUM SERPL-SCNC: 139 MMOL/L (ref 133–144)
WBC # BLD AUTO: 13.1 10E9/L (ref 4–11)

## 2021-01-16 PROCEDURE — G0378 HOSPITAL OBSERVATION PER HR: HCPCS

## 2021-01-16 PROCEDURE — 93005 ELECTROCARDIOGRAM TRACING: CPT

## 2021-01-16 PROCEDURE — 250N000011 HC RX IP 250 OP 636: Performed by: INTERNAL MEDICINE

## 2021-01-16 PROCEDURE — 85027 COMPLETE CBC AUTOMATED: CPT | Performed by: INTERNAL MEDICINE

## 2021-01-16 PROCEDURE — 99204 OFFICE O/P NEW MOD 45 MIN: CPT | Mod: 25 | Performed by: INTERNAL MEDICINE

## 2021-01-16 PROCEDURE — 36415 COLL VENOUS BLD VENIPUNCTURE: CPT | Performed by: INTERNAL MEDICINE

## 2021-01-16 PROCEDURE — 99217 PR OBSERVATION CARE DISCHARGE: CPT | Performed by: PHYSICIAN ASSISTANT

## 2021-01-16 PROCEDURE — 93306 TTE W/DOPPLER COMPLETE: CPT | Mod: 26 | Performed by: INTERNAL MEDICINE

## 2021-01-16 PROCEDURE — 85610 PROTHROMBIN TIME: CPT | Performed by: INTERNAL MEDICINE

## 2021-01-16 PROCEDURE — 96372 THER/PROPH/DIAG INJ SC/IM: CPT | Mod: XS | Performed by: INTERNAL MEDICINE

## 2021-01-16 PROCEDURE — 80048 BASIC METABOLIC PNL TOTAL CA: CPT | Performed by: INTERNAL MEDICINE

## 2021-01-16 PROCEDURE — 999N000157 HC STATISTIC RCP TIME EA 10 MIN

## 2021-01-16 PROCEDURE — 93306 TTE W/DOPPLER COMPLETE: CPT

## 2021-01-16 PROCEDURE — 93010 ELECTROCARDIOGRAM REPORT: CPT | Performed by: INTERNAL MEDICINE

## 2021-01-16 PROCEDURE — 83605 ASSAY OF LACTIC ACID: CPT | Performed by: INTERNAL MEDICINE

## 2021-01-16 RX ORDER — ACYCLOVIR 400 MG/1
400 TABLET ORAL 2 TIMES DAILY
Status: DISCONTINUED | OUTPATIENT
Start: 2021-01-16 | End: 2021-01-16 | Stop reason: HOSPADM

## 2021-01-16 RX ORDER — METOPROLOL TARTRATE 25 MG/1
12.5 TABLET, FILM COATED ORAL 2 TIMES DAILY
Qty: 30 TABLET | Refills: 0 | Status: SHIPPED | OUTPATIENT
Start: 2021-01-16

## 2021-01-16 RX ORDER — TAMSULOSIN HYDROCHLORIDE 0.4 MG/1
0.4 CAPSULE ORAL EVERY EVENING
Status: DISCONTINUED | OUTPATIENT
Start: 2021-01-16 | End: 2021-01-16 | Stop reason: HOSPADM

## 2021-01-16 RX ORDER — WARFARIN SODIUM 5 MG/1
5 TABLET ORAL
Status: DISCONTINUED | OUTPATIENT
Start: 2021-01-16 | End: 2021-01-16 | Stop reason: HOSPADM

## 2021-01-16 RX ADMIN — ENOXAPARIN SODIUM 100 MG: 100 INJECTION SUBCUTANEOUS at 08:22

## 2021-01-16 ASSESSMENT — MIFFLIN-ST. JEOR
SCORE: 1731.42
SCORE: 1735.5

## 2021-01-16 NOTE — PHARMACY-ANTICOAGULATION SERVICE
Clinical Pharmacy- Warfarin Discharge Note    Indication: Atrial Fibrillation  Therapy Goal: INR 2-3  Warfarin Prior to Admission: Yes  Warfarin PTA Regimen: New start 1/13 5 mg daily    Anticoagulation Dose History     Recent Dosing and Labs Latest Ref Rng & Units 7/7/2015 1/13/2021 1/15/2021 1/16/2021    Warfarin 2.5 mg - - - 2.5 mg -    Warfarin 5 mg - - 5 mg - -    INR 0.86 - 1.14 1.01 0.95 1.10 1.21(H)        CONTINUE these medicines which have NOT CHANGED   warfarin ANTICOAGULANT 5 MG tablet  Commonly known as: COUMADIN    Dose: 5 mg  Take 1 tablet (5 mg) by mouth daily for 14 days  Quantity: 14 tablet     enoxaparin ANTICOAGULANT 100 MG/ML syringe  Commonly known as: LOVENOX    Dose: 100 mg  Inject 1 mL (100 mg) Subcutaneous 2 times daily for 7 days  Quantity: 14 mL  Refills: 0        Follow-up Appointments      Follow-up and recommended labs and tests       Please continue Lovenox and warfarin and follow up in clinic this week as you already have scheduled.  You will need an INR check.  Please return on 1/18/2021 for placement of a heart rate monitor.  Please follow up with Cardiology in clinic.             The discharge plan above seems reasonable.

## 2021-01-16 NOTE — PLAN OF CARE
PRIMARY DIAGNOSIS: PAROXSYMAL ATRIAL FIBRILLATION  OUTPATIENT/OBSERVATION GOALS TO BE MET BEFORE DISCHARGE:  1. Ruled out acute coronary syndrome (negative or stable Troponin):  Yes  2. Pain Status: Pain free.  3. Appropriate provocative testing performed: Yes  - Stress Test Procedure: Echo  - Interpretation of cardiac rhythm per telemetry tech: A FIB     4. Cleared by Consultants (if applicable):No  5. Return to near baseline physical activity: Yes  Discharge Planner Nurse   Safe discharge environment identified: Yes  Barriers to discharge: No       Entered by: Sarah Devi 01/16/2021 10:00 AM     Vitals: /78 (BP Location: Right arm)   Pulse 85   Temp 96.4  F (35.8  C) (Oral)   Resp 18   Ht 1.829 m (6')   Wt 97.3 kg (214 lb 9.6 oz)   SpO2 95%   BMI 29.10 kg/m    BMI= Body mass index is 29.1 kg/m .     AOx4.  VSS. Denies chest pain, dizziness, lightheadedness, SOB, & palpitations.  C/o of R shoulder pain, but refused ice or any medical interventions.  Ind. Reg diet.  SL.  Tele monitoring - A FIB .  Echo completed this am.  Cardiology & SW consulted.  Strict I & Os.  Will cont to monitor & provide cares.  Please review provider order for any additional goals.   Nurse to notify provider when observation goals have been met and patient is ready for discharge.

## 2021-01-16 NOTE — ED NOTES
[facility]  ED Nurse Handoff Report    Emory Clark is a 80 year old male   ED Chief complaint: Tachycardia  . ED Diagnosis:   Final diagnoses:   Paroxysmal atrial fibrillation (H)     Allergies:   Allergies   Allergen Reactions     Antihistamine [Altaryl]      Asa [Aspirin]      History of acute renal injury      Ethanolamine      PN: LW Reaction: Unknown Reaction       Code Status: Full Code  Activity level - Baseline/Home:  Independent. Activity Level - Current:   Assist X 1. Lift room needed: No. Bariatric: No   Needed: No   Isolation: No. Infection: Not Applicable.     Vital Signs:   Vitals:    01/15/21 1815 01/15/21 1818 01/15/21 1830 01/15/21 1845   BP:  (!) 128/91 126/84 (!) 117/91   Pulse: 105 105 94 105   Resp: 15  12 20   Temp:       TempSrc:       SpO2: 97%  95% 95%       Cardiac Rhythm:  ,   Cardiac  Cardiac Rhythm: Atrial flutter;Atrial fibrillation  Pain level:    Patient confused: No. Patient Falls Risk: Yes.   Elimination Status: Has voided   Patient Report - Initial Complaint: Tachycardia. Focused Assessment: Cardiac - Cardiac WDL: .WDL except (Pt denies any sx but has been experiencing rapid HR since mid-December. Evaluated in ED 2 days ago for same sx. No CP or SOB. Initial HR was 150's. )  Cardiac Monitoring - EKG Monitoring: Yes  Cardiac Regularity: Irregular  Cardiac Rhythm: Atrial flutter; Atrial fibrillation    Tests Performed: Labs. Abnormal Results:   Labs Ordered and Resulted from Time of ED Arrival Up to the Time of Departure from the ED   CBC WITH PLATELETS DIFFERENTIAL - Abnormal; Notable for the following components:       Result Value    WBC 16.7 (*)     Absolute Neutrophil 9.4 (*)     Absolute Lymphocytes 5.6 (*)     All other components within normal limits   BASIC METABOLIC PANEL   TROPONIN I   NT PROBNP INPATIENT   INR   SARS-COV-2 (COVID-19) VIRUS RT-PCR   PERIPHERAL IV CATHETER     No orders to display      Treatments provided: IV and PO  lopressor  Family Comments: N/A  OBS brochure/video discussed/provided to patient:  Yes  ED Medications:   Medications   diltiazem (CARDIZEM) injection 10 mg (10 mg Intravenous Not Given 1/15/21 1728)   metoprolol (LOPRESSOR) injection 5 mg (5 mg Intravenous Given 1/15/21 1742)   metoprolol (LOPRESSOR) injection 5 mg (5 mg Intravenous Given 1/15/21 1819)   metoprolol tartrate (LOPRESSOR) tablet 50 mg (50 mg Oral Given 1/15/21 1818)     Drips infusing:  No  For the majority of the shift, the patient's behavior Green. Interventions performed were N/A.    Sepsis treatment initiated: No     Patient tested for COVID 19 prior to admission: YES    ED Nurse Name/Phone Number: Jaylin Gu RN,   7:18 PM    RECEIVING UNIT ED HANDOFF REVIEW    Above ED Nurse Handoff Report was reviewed: Yes  Reviewed by: Karen M. Lesch, RN on January 15, 2021 at 8:29 PM

## 2021-01-16 NOTE — PLAN OF CARE
ROOM # 229    Living Situation lives independently in the community with spouse  Facility name:   NA  :  Mary Clark  [phone] (home)  [phone] (cell)          Activity level at baseline: indep  Activity level on admit: indep      Patient registered to observation; given Patient Bill of Rights; given the opportunity to ask questions about observation status and their plan of care.  Patient has been oriented to the observation room, bathroom and call light is in place.    Discussed discharge goals and expectations with patient/family.

## 2021-01-16 NOTE — PLAN OF CARE
PRIMARY DIAGNOSIS: PAROXYSMAL ATRIAL FIBRILLATION  OUTPATIENT/OBSERVATION GOALS TO BE MET BEFORE DISCHARGE:  1. Ruled out acute coronary syndrome (negative or stable Troponin):  Yes  2. Pain Status: Pain free.  3. Appropriate provocative testing performed: Yes  - Stress Test Procedure: Echo  - Interpretation of cardiac rhythm per telemetry tech: Sinus Thien 55    4. Cleared by Consultants (if applicable):No  5. Return to near baseline physical activity: Yes  Discharge Planner Nurse   Safe discharge environment identified: Yes  Barriers to discharge: Yes       Entered by: Sarah Devi 01/16/2021    Vitals: /61 (BP Location: Right arm)   Pulse 62   Temp 96.6  F (35.9  C) (Oral)   Resp 18   Ht 1.829 m (6')   Wt 97.3 kg (214 lb 9.6 oz)   SpO2 93%   BMI 29.10 kg/m    BMI= Body mass index is 29.1 kg/m .     AOx4.  VSS.  Denies pain.  Reg diet - adequate intake.  Tolerating PO meds.  Ind.  SL.  ECHO completed this am - A Fib w/ decreased EF 35% to 40%.  Cardiology consulted.  Tele monitoring.  Will cont to monitor & provide cares.      Please review provider order for any additional goals.   Nurse to notify provider when observation goals have been met and patient is ready for discharge.

## 2021-01-16 NOTE — PLAN OF CARE
PRIMARY DIAGNOSIS: PAROXYSMAL ATRIAL FIBRILLATION  OUTPATIENT/OBSERVATION GOALS TO BE MET BEFORE DISCHARGE:  1. Ruled out acute coronary syndrome (negative or stable Troponin):  Yes  2. Pain Status: Pain free.  3. Appropriate provocative testing performed: No  - Stress Test Procedure: Echocardiogram ordered for tomorrow AM  - Interpretation of cardiac rhythm per telemetry tech: atrial flutter RVR with HR 110s    4. Cleared by Consultants (if applicable):No, cardiology consulted   5. Return to near baseline physical activity: Yes  /83 (BP Location: Right arm)   Pulse 76   Temp 96.6  F (35.9  C) (Oral)   Resp 18   Ht 1.829 m (6')   Wt 97.8 kg (215 lb 8 oz)   SpO2 95%   BMI 29.23 kg/m       Discharge Planner Nurse   Safe discharge environment identified: Yes  Barriers to discharge: Yes       Entered by: Jyothi Warner 01/16/2021 4:59 AM     Please review provider order for any additional goals.   Nurse to notify provider when observation goals have been met and patient is ready for discharge.  Patient alert and oriented x4. Denies pain, lightheadedness, dizziness, SOB. Up independently in room. IV SL. Tolerating regular diet. Strict intake and output. Telemetry monitoring. Patient up to bathroom and HR increased to 140s, patient again denied SOB, chest pain, dizziness, lightheadedness. HR decreased to 100-120s once patient got back into bed. Currently atrial flutter RVR with -110s. Echocardiogram ordered for AM. Cardiology and SW consulted. Will continue with plan of care.

## 2021-01-16 NOTE — PLAN OF CARE
Patient's After Visit Summary was reviewed with patient   Patient verbalized understanding of After Visit Summary, recommended follow up and was given an opportunity to ask questions.   Discharge medications sent home with patient/family: Yes.  Filled prescription for [place]rol[place] given to patient  Discharged to main entrance to wait for pickup by spouse.      OBSERVATION patient END time: 16:05 PM

## 2021-01-16 NOTE — H&P
[facility]  History and Physical  Hospitalist       Date of Admission:  1/15/2021    Chief Complaint   High heart rate    History is obtained from the patient.    History of Present Illness   Emory Clark is a 80 year old male with past medical history of newly diagnosed paroxysmal atrial fibrillation currently transitioning from Lovenox to Coumadin, chronic lymphocytic leukemia on ibrutinib, cardiomyopathy, NSTEMI, GERD, insomnia, asthma, anemia, basal cell carcinoma who presents to the hospital with chief complaint of elevated heart rate.  Patient states he was seen in the emergency department on January 13 for tachycardia.  He was found at that time to be in atrial flutter with rate as high as 150.  At that time the patient was given Lopressor as well as Cardizem with improvement of his heart rate.  He was started on Lovenox with plan to transition to warfarin.  Today he was out doing some yard work when he came inside he noticed that his heart rate was in the 150s on his Fitbit.  He denies any shortness of breath, palpitations, lightheadedness or dizziness, chest pain/pressure, nausea or vomiting.  At that time he decided to come into the emergency department.  He does note that he sees an oncologist at the [facility] regarding his CLL and currently is on ibrutinib.    In the emergency department the patient was given IV Lopressor with improvement of his heart rate.  The patient was admitted to observation.    ASSESSMENT/PLAN    Atrial Flutter with RVR  - Continue Lopressor 2.5 mg IV prn HR > 120  - Check Echo  - Consult Cardiology  - Telemetry   - CHADsVASC = 4-5, continue lovenox for now as INR is subtherapeutic  - Pharmacy to dose coumadin    Leukocytosis  - Likely secondary to CLL  - Previously 18.2 on 1/13/2021  - Repeat CBC in AM    Chronic Lymphocytic Leukemia  - Continue Ibrutinib    Chronic Medical Problems:  NSTEMI  Cardiomyopathy  Mild Persistent Asthma  Elevated  PSA  GERD  Insomnia      DVT Prophylaxis: Lovenox/Warfarin  Code Status: Full Code  Discharge Plan: Expected discharge: Tomorrow, recommended to prior living arrangement once seen by cardiology.      Adam Moore, DO    Primary Care Physician   Fabrice Humphreys    -----------------------------------------------------------------------------------------------------------------------------------------------------------------------------------------------------    Past Medical History    I have reviewed this patient's medical history and updated it with pertinent information if needed.   Past Medical History:   Diagnosis Date     Basal cell carcinoma of face      CARDIOVASCULAR SCREENING; LDL GOAL LESS THAN 160 1/8/2014     CLL (chronic lymphocytic leukaemia)      Elevated PSA      GERD (gastroesophageal reflux disease)      Insomnia      Pleural effusion 7/10/2014       Past Surgical History   I have reviewed this patient's surgical history and updated it with pertinent information if needed.  Past Surgical History:   Procedure Laterality Date     basal cell excision       CHOLECYSTECTOMY       LAPAROSCOPIC APPENDECTOMY         Prior to Admission Medications   Prior to Admission Medications   Prescriptions Last Dose Informant Patient Reported? Taking?   B Complex Vitamins (VITAMIN B COMPLEX PO)   Yes No   Sig: Take 1 tablet by mouth daily    Multiple Vitamin (MULTIVITAMINS PO)   Yes No   Sig: Take 1 tablet by mouth daily    Multiple Vitamins-Minerals (PRESERVISION/LUTEIN) CAPS   Yes No   Sig: Take 1 capsule by mouth   acyclovir (ZOVIRAX) 400 MG tablet   Yes No   Sig: Take 400 mg by mouth   enoxaparin ANTICOAGULANT (LOVENOX) 100 MG/ML syringe   No No   Sig: Inject 1 mL (100 mg) Subcutaneous 2 times daily for 7 days   ibrutinib (IMBRUVICA) 140 MG capsule   Yes No   Sig: Take by mouth daily   traZODone (DESYREL) 50 MG tablet   No No   Sig: Take one or two at bedtime as needed for sleep   warfarin ANTICOAGULANT  (COUMADIN) 5 MG tablet   No No   Sig: Take 1 tablet (5 mg) by mouth daily for 14 days      Facility-Administered Medications: None     Allergies   Allergies   Allergen Reactions     Antihistamine [Altaryl]      Asa [Aspirin]      History of acute renal injury      Ethanolamine      PN: LW Reaction: Unknown Reaction       Social History   I have reviewed this patient's social history and updated it with pertinent information if needed. Emory Clark  reports that he has never smoked. He has never used smokeless tobacco. He reports current alcohol use. He reports that he does not use drugs.    Family History   I have reviewed this patient's family history and updated it with pertinent information if needed.   Family History   Problem Relation Age of Onset     Neurologic Disorder Mother         Parkinsons     Other Cancer Father         CLL       -----------------------------------------------------------------------------------------------------------------------------------------------------------------------------------------------------    Review of Systems   The 10 point Review of Systems is negative other than noted in the HPI or here.     Physical Exam   Temp: 96.4  F (35.8  C) Temp src: Temporal BP: (!) 117/91 Pulse: 105   Resp: 20 SpO2: 95 %      Vital Signs with Ranges  Temp:  [96.4  F (35.8  C)] 96.4  F (35.8  C)  Pulse:  [] 105  Resp:  [12-26] 20  BP: (114-141)/() 117/91  SpO2:  [94 %-97 %] 95 %  0 lbs 0 oz    Constitutional: Awake, alert, cooperative, no apparent distress.  Eyes: Conjunctiva and pupils examined and normal.  HEENT: Moist mucous membranes, normal dentition.  Respiratory: Clear to auscultation bilaterally, no crackles or wheezing.  Cardiovascular: Irregular, and no murmur noted.  GI: Soft, non-distended, non-tender, normal bowel sounds.  Lymph/Hematologic: No anterior cervical or supraclavicular adenopathy.  Skin: No rashes, no cyanosis, no edema.  Musculoskeletal: No joint  swelling, erythema or tenderness.  Neurologic: Cranial nerves 2-12 intact, normal strength and sensation.  Psychiatric: Alert, oriented to person, place and time, no obvious anxiety or depression.     Data     Recent Labs   Lab 01/15/21  1714 01/13/21  1751   WBC 16.7* 18.2*   HGB 16.5 16.7   MCV 91 93    267   INR 1.10 0.95    138   POTASSIUM 4.0 3.8   CHLORIDE 107 106   CO2 26 28   BUN 18 15   CR 0.86 0.87   ANIONGAP 4 4   MEHRAN 9.2 9.3   GLC 91 90   TROPI <0.015 <0.015       No results found for this or any previous visit (from the past 24 hour(s)).

## 2021-01-16 NOTE — PHARMACY-ANTICOAGULATION SERVICE
Clinical Pharmacy - Warfarin Dosing Consult     Pharmacy has been consulted to manage this patient s warfarin therapy.  Indication: Atrial Fibrillation  Therapy Goal: INR 2-3  Warfarin Prior to Admission: Yes  Warfarin PTA Regimen: New start 1/13 5 mg daily  Significant drug interactions: Lovenox  Dose Comments: Had 5 mg dose at noon today    INR   Date Value Ref Range Status   01/15/2021 1.10 0.86 - 1.14 Final   01/13/2021 0.95 0.86 - 1.14 Final       Recommend warfarin 2.5 mg today, in addition to 5 mg patient had earlier today.  By day 3 would expect INR to be higher than 1.1 so total 7.5 mg given today.  Currently also on Lovenox bridging.  Pharmacy will monitor Emory ADINA Deye daily and order warfarin doses to achieve specified goal.      Please contact pharmacy as soon as possible if the warfarin needs to be held for a procedure or if the warfarin goals change.

## 2021-01-16 NOTE — CONSULTS
[facility]    Cardiology Consultation     Date of Admission:  1/15/2021    Assessment & Plan     This is a 80 year old male with past medical history of newly diagnosed paroxysmal atrial fibrillation currently transitioning from Lovenox to Coumadin, chronic lymphocytic leukemia on ibrutinib, new onset cardiomyopathy (EF 55% in 2017), GERD, insomnia, asthma, anemia, basal cell carcinoma who presents after recent ER admission for recurrent rapid AFIB. He was seen January 13th and treated with IV diltiazem and lopressor and returns with again RVR to the 150s. He was not on any medications at home after his recent ER visit. He was admitted for management. His echocardiogram demonstrated a newly decreased LVEF to 35-40%, global hypokinesis. This morning converted to NSR, now rate in the 60s. No significant findings for heart failure on exam or by symptoms. CXR normal.     1. Noted to have 2:1 atrial flutter with RBBB and LAFB on EKG several days ago with  bpm, and today is Atrial fibrillation: discussed that AF is quite common in his age group but it is also listed as a side effect of his CLL regimen Ibrutinib  -CHADSVASC 5, lovenox bridge to warfarin INR goal 2-3 discussed without interrupted doses  -start metoprolol 12.5 mg bid (resting heart rate is only in the 60s  -Would recommend an outpatient ziopatch to evaluate for burden of AFIB/AFLUTTER and rate control   -dilitazem is contraindicated in heart failure, and also cannot be given with ibrutinib  -will place EP referral for rhythm control strategy considerations based on ziopatch results, may be eventual ablation candidate   -will need repeat echocardiogram eventually to see if AFIB control improves LVEF     2. Mild-moderate aortic stenosis:  -asymptomatic, continue surveillance with echocardiogram and outpatient cardiology     3. Reduced LVEF, new. EF 35-40%. 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 Reviewed cardiomyopathy incidence  with his CLL regimen which is not common.   -may recover with adequate AFIB control if this is tachy-mediated, will need repeat echo as above   -outpatient ischemic evaluation can be considered, no symptoms to suggest ischemia at this time so no aspirin needed  -lipid panel evaluation   -IVC normal and no PH on echo so do not suspect need for diuretic at this time but will need following     Will set patient up with outpatient ziopatch and EP follow up.     Thank you for allowing me to partake in the care of this patient.     Carmelita Rashid MD MSC  [facility]     Carmelita Rashid MD    Code Status    Full Code    Reason for Consult     Atrial fibrillation     Primary Care Physician   *Fabrice uHmphreys    Chief Complaint   Palpitations     History of Present Illness     This is a 80 year old male with past medical history of newly diagnosed paroxysmal atrial fibrillation currently transitioning from Lovenox to Coumadin, chronic lymphocytic leukemia on ibrutinib, cardiomyopathy, NSTEMI, GERD, insomnia, asthma, anemia, basal cell carcinoma who presents to the hospital with chief complaint of elevated heart rate. He was seen in ER on Jan 13th and was noted to be in atrial flutter. It converted with dilitazem and lopressor and he was sent home without any medications. He was started on lovenox at that time with goal bridge to INR 2-3 on warfarin. He presents again on this admission with palpitations when out doing yard work. He denied chest pain, LE edema, SOB, nausea/vomiting or abdominal pain. No history of syncope. He does note that he sees an oncologist at the [facility] regarding his CLL and currently is on ibrutinib.       Past Medical History   I have reviewed this patient's medical history and updated it with pertinent information if needed.   Past Medical History:   Diagnosis Date     Basal cell carcinoma of face      CARDIOVASCULAR SCREENING; LDL GOAL LESS THAN 160 1/8/2014     CLL (chronic  lymphocytic leukaemia)      Elevated PSA      GERD (gastroesophageal reflux disease)      Insomnia      Pleural effusion 7/10/2014       Past Surgical History   I have reviewed this patient's surgical history and updated it with pertinent information if needed.  Past Surgical History:   Procedure Laterality Date     basal cell excision       CHOLECYSTECTOMY       LAPAROSCOPIC APPENDECTOMY         Prior to Admission Medications   Prior to Admission Medications   Prescriptions Last Dose Informant Patient Reported? Taking?   B Complex Vitamins (VITAMIN B COMPLEX PO) 1/15/2021 at Unknown time  Yes Yes   Sig: Take 1 tablet by mouth daily    Multiple Vitamin (MULTIVITAMINS PO) 1/15/2021 at Unknown time  Yes Yes   Sig: Take 1 tablet by mouth daily    Multiple Vitamins-Minerals (PRESERVISION/LUTEIN) CAPS 1/15/2021 at x1  Yes Yes   Sig: Take 1 capsule by mouth 2 times daily    Vitamin D3 (CHOLECALCIFEROL) 25 mcg (1000 units) tablet 1/14/2021 at Unknown time  Yes Yes   Sig: Take 50 mcg by mouth every evening   acyclovir (ZOVIRAX) 400 MG tablet 1/15/2021 at x1  Yes Yes   Sig: Take 400 mg by mouth 2 times daily    enoxaparin ANTICOAGULANT (LOVENOX) 100 MG/ML syringe 1/15/2021 at x1   No Yes   Sig: Inject 1 mL (100 mg) Subcutaneous 2 times daily for 7 days   ibrutinib (IMBRUVICA) 140 MG capsule 1/15/2021 at Unknown time  Yes Yes   Sig: Take 280 mg by mouth daily    tamsulosin (FLOMAX) 0.4 MG capsule 1/14/2021 at Unknown time  Yes Yes   Sig: Take 0.4 mg by mouth every evening   traZODone (DESYREL) 50 MG tablet 1/14/2021 at Unknown time  Yes Yes   Sig: Take 50 mg by mouth At Bedtime   vitamin C (ASCORBIC ACID) 1000 MG TABS 1/14/2021 at pm  Yes Yes   Sig: Take 1,000 mg by mouth every other day   warfarin ANTICOAGULANT (COUMADIN) 5 MG tablet 1/15/2021 at Noon  No Yes   Sig: Take 1 tablet (5 mg) by mouth daily for 14 days   zinc gluconate 50 MG tablet 1/14/2021 at pm   Yes Yes   Sig: Take 50 mg by mouth every other day       Facility-Administered Medications: None     Allergies   Allergies   Allergen Reactions     Antihistamine [Altaryl]      Asa [Aspirin]      History of acute renal injury      Ethanolamine      PN: LW Reaction: Unknown Reaction       Social History   I have reviewed this patient's social history and updated it with pertinent information if needed. Emory Clark  reports that he has never smoked. He has never used smokeless tobacco. He reports current alcohol use. He reports that he does not use drugs.    Family History   I have reviewed this patient's family history and updated it with pertinent information if needed.   Family History   Problem Relation Age of Onset     Neurologic Disorder Mother         Parkinsons     Other Cancer Father         CLL       Review of Systems   The 10 point Review of Systems is negative other than noted in the HPI or here.     Physical Exam   Temp: 96.4  F (35.8  C) Temp src: Oral BP: 125/78 Pulse: 85   Resp: 18 SpO2: 95 % O2 Device: None (Room air)    Vital Signs with Ranges  Temp:  [96.4  F (35.8  C)-97.4  F (36.3  C)] 96.4  F (35.8  C)  Pulse:  [] 85  Resp:  [12-26] 18  BP: (104-141)/() 125/78  SpO2:  [93 %-97 %] 95 %  214 lbs 9.6 oz    Constitutional: Awake, alert, cooperative, no apparent distress.  Eyes: Conjunctiva and pupils examined and normal.  HEENT: Moist mucous membranes, normal dentition.  Respiratory: Clear to auscultation bilaterally, no crackles or wheezing.  Cardiovascular: Regular rate and rhythm, normal S1 and S2, and no murmur noted.  GI: Soft, non-distended, non-tender, normal bowel sounds.  Lymph/Hematologic: No anterior cervical or supraclavicular adenopathy.  Skin: No rashes, no cyanosis, no edema.  Musculoskeletal: No joint swelling, erythema or tenderness.  Neurologic: Cranial nerves 2-12 intact, normal strength and sensation.  Psychiatric: Alert, oriented to person, place and time, no obvious anxiety or depression.     Data   Results for  orders placed or performed during the hospital encounter of 01/15/21 (from the past 24 hour(s))   EKG 12 lead   Result Value Ref Range    Interpretation ECG Click View Image link to view waveform and result    CBC with platelets differential   Result Value Ref Range    WBC 16.7 (H) 4.0 - 11.0 10e9/L    RBC Count 5.44 4.4 - 5.9 10e12/L    Hemoglobin 16.5 13.3 - 17.7 g/dL    Hematocrit 49.4 40.0 - 53.0 %    MCV 91 78 - 100 fl    MCH 30.3 26.5 - 33.0 pg    MCHC 33.4 31.5 - 36.5 g/dL    RDW 13.9 10.0 - 15.0 %    Platelet Count 243 150 - 450 10e9/L    Diff Method Automated Method     % Neutrophils 56.4 %    % Lymphocytes 33.3 %    % Monocytes 7.1 %    % Eosinophils 1.9 %    % Basophils 0.7 %    % Immature Granulocytes 0.6 %    Nucleated RBCs 0 0 /100    Absolute Neutrophil 9.4 (H) 1.6 - 8.3 10e9/L    Absolute Lymphocytes 5.6 (H) 0.8 - 5.3 10e9/L    Absolute Monocytes 1.2 0.0 - 1.3 10e9/L    Absolute Eosinophils 0.3 0.0 - 0.7 10e9/L    Absolute Basophils 0.1 0.0 - 0.2 10e9/L    Abs Immature Granulocytes 0.1 0 - 0.4 10e9/L    Absolute Nucleated RBC 0.0     Reactive Lymphs Present     RBC Morphology Consistent with reported results     Platelet Estimate       Automated count confirmed.  Platelet morphology is normal.   Basic metabolic panel   Result Value Ref Range    Sodium 137 133 - 144 mmol/L    Potassium 4.0 3.4 - 5.3 mmol/L    Chloride 107 94 - 109 mmol/L    Carbon Dioxide 26 20 - 32 mmol/L    Anion Gap 4 3 - 14 mmol/L    Glucose 91 70 - 99 mg/dL    Urea Nitrogen 18 7 - 30 mg/dL    Creatinine 0.86 0.66 - 1.25 mg/dL    GFR Estimate 83 >60 mL/min/[1.73_m2]    GFR Estimate If Black >90 >60 mL/min/[1.73_m2]    Calcium 9.2 8.5 - 10.1 mg/dL   Troponin I   Result Value Ref Range    Troponin I ES <0.015 0.000 - 0.045 ug/L   Nt probnp inpatient (BNP)   Result Value Ref Range    N-Terminal Pro BNP Inpatient 251 0 - 1,800 pg/mL   INR   Result Value Ref Range    INR 1.10 0.86 - 1.14   EKG 12 lead   Result Value Ref Range     Interpretation ECG Click View Image link to view waveform and result    EKG 12 lead   Result Value Ref Range    Interpretation ECG Click View Image link to view waveform and result    Asymptomatic SARS-CoV-2 COVID-19 Virus (Coronavirus) by PCR    Specimen: Nasopharyngeal   Result Value Ref Range    SARS-CoV-2 Virus Specimen Source Nasopharyngeal     SARS-CoV-2 PCR Result NEGATIVE     SARS-CoV-2 PCR Comment (Note)    Lactic acid level STAT   Result Value Ref Range    Lactate for Sepsis Protocol 0.7 0.7 - 2.0 mmol/L   Basic metabolic panel   Result Value Ref Range    Sodium 139 133 - 144 mmol/L    Potassium 3.7 3.4 - 5.3 mmol/L    Chloride 109 94 - 109 mmol/L    Carbon Dioxide 25 20 - 32 mmol/L    Anion Gap 5 3 - 14 mmol/L    Glucose 84 70 - 99 mg/dL    Urea Nitrogen 15 7 - 30 mg/dL    Creatinine 0.74 0.66 - 1.25 mg/dL    GFR Estimate 88 >60 mL/min/[1.73_m2]    GFR Estimate If Black >90 >60 mL/min/[1.73_m2]    Calcium 8.7 8.5 - 10.1 mg/dL   CBC with platelets   Result Value Ref Range    WBC 13.1 (H) 4.0 - 11.0 10e9/L    RBC Count 5.25 4.4 - 5.9 10e12/L    Hemoglobin 15.7 13.3 - 17.7 g/dL    Hematocrit 48.2 40.0 - 53.0 %    MCV 92 78 - 100 fl    MCH 29.9 26.5 - 33.0 pg    MCHC 32.6 31.5 - 36.5 g/dL    RDW 14.0 10.0 - 15.0 %    Platelet Count 199 150 - 450 10e9/L   INR   Result Value Ref Range    INR 1.21 (H) 0.86 - 1.14   Echocardiogram Complete    Narrative    [id number]  [id number]  [id number]  [id number]^PARMINDER^WILLIAM^[facility]  Echocardiography Laboratory  [address] [place] [address]        Name: MARIVEL IGNACIO  MRN: [id number]  : 1940  Study Date: 2021 07:40 AM  Age: 80 yrs  Gender: Male  Patient Location: [facility]  Reason For Study: Atrial Fibrillation  Ordering Physician: WILLIAM ZURITA  Referring Physician: Fabrice Humphreys  Performed By: Saulo Reed RDCS     BSA: 2.2 m2  Height: 72 in  Weight: 210 lb  HR: 95  BP: 122/81  "mmHg  _____________________________________________________________________________  __        Procedure  Complete Portable Echo Adult.  _____________________________________________________________________________  __        Interpretation Summary     Rhythm is atrial fibrillation.  Moderately decreased left ventricular systolic function  The visual ejection fraction is estimated at 35-40%.  Proximal septal thickening is noted.  Septal motion is consistent with conduction abnormality.  Mildly reduced RV function.  Mild-moderate aortic stenosis. Vmax 2.1 m/s, mean gradient averaged 11 mmHg,  THIAGO by continuity 1.6 cm2. DVI of 0.43. Gradient may be underestimated in  setting of \"low flow\" (Stroke volume index 27 ml/m2).  No pulmonary hypertension.  The inferior vena cava is normal.  There is no pericardial effusion.     Compared to prior study dated 6/7/2017, left ventricular function has  decreased and mild-moderate aortic stenosis is present. Rhythm is atrial  fibrillation.  _____________________________________________________________________________  __        Left Ventricle  The left ventricle is normal in size. Proximal septal thickening is noted.  Moderately decreased left ventricular systolic function. The visual ejection  fraction is estimated at 35-40%. LVEF 39% based on biplane 2D tracing.  Diastolic function not assessed due to atrial fibrillation. Septal motion is  consistent with conduction abnormality.     Right Ventricle  The right ventricle is normal size. Mildly decreased right ventricular  systolic function.     Atria  Normal left atrial size. Right atrial size is normal.     Mitral Valve  There is mild mitral annular calcification. There is trace mitral  regurgitation.        Tricuspid Valve  The tricuspid valve is normal in structure and function. The right ventricular  systolic pressure is approximated at 20.1 mmHg plus the right atrial pressure.  Right ventricular systolic pressure is normal. " "There is mild (1+) tricuspid  regurgitation.     Aortic Valve  There is moderate trileaflet aortic sclerosis. Mild-moderate aortic stenosis.  Vmax 2.1 m/s, mean gradient averaged 11 mmHg, THIAGO by continuity 1.6 cm2. DVI  of 0.43. Gradient may be underestimated in setting of \"low flow\" (Stroke  volume index 27 ml/m2).     Pulmonic Valve  The pulmonic valve is normal in structure and function. There is mild (1+)  pulmonic valvular regurgitation.     Vessels  The aortic root is normal size. The ascending aorta is Mildly dilated.  Ascending aorta measures 3.9 cm which is borderline mildly dilated when  indexed to BSA. The inferior vena cava is normal.     Pericardium  There is no pericardial effusion.        Rhythm  The rhythm was atrial fibrillation.  _____________________________________________________________________________  __  MMode/2D Measurements & Calculations  IVSd: 0.93 cm     LVIDd: 3.9 cm  LVIDs: 2.9 cm  LVPWd: 1.1 cm  FS: 24.6 %  LV mass(C)d: 125.8 grams  LV mass(C)dI: 57.8 grams/m2  Ao root diam: 4.0 cm  LA dimension: 3.8 cm  asc Aorta Diam: 3.9 cm  LA/Ao: 0.95  LVOT diam: 2.2 cm  LVOT area: 3.9 cm2  LA Volume (BP): 51.0 ml  LA Volume Index (BP): 23.4 ml/m2  RWT: 0.56           Doppler Measurements & Calculations  MV E max erlin: 104.6 cm/sec  MV dec time: 0.19 sec  Ao V2 max: 220.1 cm/sec  Ao max P.0 mmHg  Ao V2 mean: 159.2 cm/sec  Ao mean P.4 mmHg  Ao V2 VTI: 36.9 cm  THIAGO(I,D): 1.7 cm2  THIAGO(V,D): 1.6 cm2  LV V1 max PG: 3.4 mmHg  LV V1 max: 92.1 cm/sec  LV V1 VTI: 16.1 cm  SV(LVOT): 63.3 ml  SI(LVOT): 29.1 ml/m2  TR max erlin: 224.0 cm/sec  TR max P.1 mmHg  AV Erlin Ratio (DI): 0.42  THIAGO Index (cm2/m2): 0.79  E/E' av.3  Lateral E/e': 9.9  Medial E/e': 14.7              _____________________________________________________________________________  __        Report approved by: Sam Bustos 2021 08:59 AM                    "

## 2021-01-16 NOTE — PHARMACY-ADMISSION MEDICATION HISTORY
Admission medication history interview status for this patient is complete. See [facility] admission navigator for allergy information, prior to admission medications and immunization status.     Medication history interview done via telephone during Covid-19 pandemic, indicate source(s): Patient  Medication history resources (including written lists, pill bottles, clinic record):None  Pharmacy: -    Changes made to PTA medication list:  Added: Zn, flomax, vit C, vit D  Deleted: -  Changed: -    Actions taken by pharmacist (provider contacted, etc):called pt for mr     Additional medication history information:None    Medication reconciliation/reorder completed by provider prior to medication history?  no   (Y/N)     For patients on insulin therapy:   Do you use sliding scale insulin based on blood sugars?   What is your pre-meal insulin coverage?    Do you typically eat three meals a day?   How many times do you check your blood glucose per day?   How many episodes of hypoglycemia do you typically have per month?   Do you have a Continuous Glucose Monitor (CGM)?      Prior to Admission medications    Medication Sig Last Dose Taking? Auth Provider   acyclovir (ZOVIRAX) 400 MG tablet Take 400 mg by mouth 2 times daily  1/15/2021 at x1 Yes Reported, Patient   B Complex Vitamins (VITAMIN B COMPLEX PO) Take 1 tablet by mouth daily  1/15/2021 at Unknown time Yes Reported, Patient   enoxaparin ANTICOAGULANT (LOVENOX) 100 MG/ML syringe Inject 1 mL (100 mg) Subcutaneous 2 times daily for 7 days 1/15/2021 at x1  Yes Brendan sIbell MD   ibrutinib (IMBRUVICA) 140 MG capsule Take 280 mg by mouth daily  1/15/2021 at Unknown time Yes Reported, Patient   Multiple Vitamin (MULTIVITAMINS PO) Take 1 tablet by mouth daily  1/15/2021 at Unknown time Yes Reported, Patient   Multiple Vitamins-Minerals (PRESERVISION/LUTEIN) CAPS Take 1 capsule by mouth 2 times daily  1/15/2021 at x1 Yes Reported, Patient   tamsulosin (FLOMAX) 0.4 MG  capsule Take 0.4 mg by mouth every evening 1/14/2021 at Unknown time Yes Unknown, Entered By History   traZODone (DESYREL) 50 MG tablet Take 50 mg by mouth At Bedtime 1/14/2021 at Unknown time Yes Unknown, Entered By History   vitamin C (ASCORBIC ACID) 1000 MG TABS Take 1,000 mg by mouth every other day 1/14/2021 at pm Yes Unknown, Entered By History   Vitamin D3 (CHOLECALCIFEROL) 25 mcg (1000 units) tablet Take 50 mcg by mouth every evening 1/14/2021 at Unknown time Yes Unknown, Entered By History   warfarin ANTICOAGULANT (COUMADIN) 5 MG tablet Take 1 tablet (5 mg) by mouth daily for 14 days 1/15/2021 at Noon Yes Brendan Isbell MD   zinc gluconate 50 MG tablet Take 50 mg by mouth every other day 1/14/2021 at pm  Yes Unknown, Entered By History

## 2021-01-16 NOTE — DISCHARGE SUMMARY
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 In brief, this is a 80 year old male with a history of Chronic Lymphocytic Leukeami, GERD, Insomnia, Asthma, Anemia, NSTEMI, Atrial Fibrillation who presented to the ED on 1/15/2021 with an elevated HR.     Atrial Fibrillation/Flutter with RVR - pt newly diagnosed with atrial flutter after presenting to the ED on 1/13 with elevated heart rates he noticed on his fit bit since the end of December.  He converted to NSR at that time after treatment with Lopressor and Diltiazem IV and discharged home on Lovenox bridging to Coumadin.  Pt presented back to the ED on 1/15 with persistent tachycardia.  EKG revealed atrial fibrillation.    - telemetry overnight atrial flutter with rates as high as 140s now with conversion to NSR this morning  - echocardiogram revealed atrial fibrillation with decreased EF 35-40% which is likely due to tachycardia  - patient not on any rate controlling medications pta; Cardiology consulted for assistance (pt reports he is unable to take Diltiazem or Verapamil due to his CLL medication interaction).  Cardiology recommended to start Metoprolol 12.5mg bid  - continue pta Lovenox/Warfarin bridge with INR goal 2-3.  Patient to follow up with PCP next week for INR check as scheduled.  - outpatient Zio patch ordered  - patient to follow up with Cardiology in  clinic.  He will need a repeat echocardiogram and an EP referral was placed.     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  EF 35-40% down from 55-60% in 2017.  Last NM Lexiscan  was normal.  No significant fluid overload on exam.  CXR on  negative. Cardiology consulted for medication management as above.  No diuresis was recommended.  Follow up with Cardiology in clinic and will need a repeat echocardiogram     Aortic Stenosis - mild/mod noted on echocardiogram.  Will need ongoing monitoring       CLL - diagnosed .  Followed by the [facility].  Currently on Ibrutinib and Acyclovir.    Procedures/Imaging:     Results for orders placed or performed during the hospital encounter of 01/15/21   Echocardiogram Complete    Narrative    [id number]  [id number]  [id number]  [id number]^PARMINDER^WILLIAM^[facility]  Echocardiography Laboratory  [address] [place] [address]        Name: MARIVEL IGNACIO  MRN: [id number]  : 1940  Study Date: 2021 07:40 AM  Age: 80 yrs  Gender: Male  Patient Location: [facility]  Reason For Study: Atrial Fibrillation  Ordering Physician: WILLIAM ZURITA  Referring Physician: Fabrice Humphreys  Performed By: Saulo Reed RDCS     BSA: 2.2 m2  Height: 72 in  Weight: 210 lb  HR: 95  BP: 122/81 mmHg  _____________________________________________________________________________  __        Procedure  Complete Portable Echo Adult.  _____________________________________________________________________________  __        Interpretation Summary     Rhythm is atrial fibrillation.  Moderately decreased left ventricular systolic function  The visual ejection fraction is estimated at 35-40%.  Proximal septal thickening is noted.  Septal motion is consistent with conduction abnormality.  Mildly reduced RV function.  Mild-moderate aortic stenosis. Vmax 2.1 m/s, mean gradient averaged 11 mmHg,  THIAGO by continuity 1.6 cm2. DVI of 0.43.  "Gradient may be underestimated in  setting of \"low flow\" (Stroke volume index 27 ml/m2).  No pulmonary hypertension.  The inferior vena cava is normal.  There is no pericardial effusion.     Compared to prior study dated 6/7/2017, left ventricular function has  decreased and mild-moderate aortic stenosis is present. Rhythm is atrial  fibrillation.  _____________________________________________________________________________  __        Left Ventricle  The left ventricle is normal in size. Proximal septal thickening is noted.  Moderately decreased left ventricular systolic function. The visual ejection  fraction is estimated at 35-40%. LVEF 39% based on biplane 2D tracing.  Diastolic function not assessed due to atrial fibrillation. Septal motion is  consistent with conduction abnormality.     Right Ventricle  The right ventricle is normal size. Mildly decreased right ventricular  systolic function.     Atria  Normal left atrial size. Right atrial size is normal.     Mitral Valve  There is mild mitral annular calcification. There is trace mitral  regurgitation.        Tricuspid Valve  The tricuspid valve is normal in structure and function. The right ventricular  systolic pressure is approximated at 20.1 mmHg plus the right atrial pressure.  Right ventricular systolic pressure is normal. There is mild (1+) tricuspid  regurgitation.     Aortic Valve  There is moderate trileaflet aortic sclerosis. Mild-moderate aortic stenosis.  Vmax 2.1 m/s, mean gradient averaged 11 mmHg, THIAGO by continuity 1.6 cm2. DVI  of 0.43. Gradient may be underestimated in setting of \"low flow\" (Stroke  volume index 27 ml/m2).     Pulmonic Valve  The pulmonic valve is normal in structure and function. There is mild (1+)  pulmonic valvular regurgitation.     Vessels  The aortic root is normal size. The ascending aorta is Mildly dilated.  Ascending aorta measures 3.9 cm which is borderline mildly dilated when  indexed to BSA. The inferior vena " cava is normal.     Pericardium  There is no pericardial effusion.        Rhythm  The rhythm was atrial fibrillation.  _____________________________________________________________________________  __  MMode/2D Measurements & Calculations  IVSd: 0.93 cm     LVIDd: 3.9 cm  LVIDs: 2.9 cm  LVPWd: 1.1 cm  FS: 24.6 %  LV mass(C)d: 125.8 grams  LV mass(C)dI: 57.8 grams/m2  Ao root diam: 4.0 cm  LA dimension: 3.8 cm  asc Aorta Diam: 3.9 cm  LA/Ao: 0.95  LVOT diam: 2.2 cm  LVOT area: 3.9 cm2  LA Volume (BP): 51.0 ml  LA Volume Index (BP): 23.4 ml/m2  RWT: 0.56           Doppler Measurements & Calculations  MV E max erlin: 104.6 cm/sec  MV dec time: 0.19 sec  Ao V2 max: 220.1 cm/sec  Ao max P.0 mmHg  Ao V2 mean: 159.2 cm/sec  Ao mean P.4 mmHg  Ao V2 VTI: 36.9 cm  THIAGO(I,D): 1.7 cm2  THIAGO(V,D): 1.6 cm2  LV V1 max PG: 3.4 mmHg  LV V1 max: 92.1 cm/sec  LV V1 VTI: 16.1 cm  SV(LVOT): 63.3 ml  SI(LVOT): 29.1 ml/m2  TR max erlin: 224.0 cm/sec  TR max P.1 mmHg  AV Erlin Ratio (DI): 0.42  THIAGO Index (cm2/m2): 0.79  E/E' av.3  Lateral E/e': 9.9  Medial E/e': 14.7              _____________________________________________________________________________  __        Report approved by: Sam Bustos 2021 08:59 AM        Consultations:   Cardiology    Allergies:     Allergies   Allergen Reactions     Antihistamine [Altaryl]      Asa [Aspirin]      History of acute renal injury      Ethanolamine      PN: LW Reaction: Unknown Reaction        Subjective:   Pt reports tachycardia noted on his fit bit yesterday after working outside.  He then felt fatigued, but denies any chest pain or shortness of breath.  Denies abdominal pain, swelling.  Tolerating po well.    Physical Exam:   Blood pressure 127/61, pulse 62, temperature 96.6  F (35.9  C), temperature source Oral, resp. rate 18, height 1.829 m (6'), weight 97.3 kg (214 lb 9.6 oz), SpO2 93 %.  General: Alert, interactive, NAD, lying in bed, pleasant and  cooperative.  HEENT: AT/NC  Resp: clear to auscultation bilaterally, no crackles or wheezes  Cardiac: irregular rate and rhythm, no murmur  Abdomen: Soft, nontender, nondistended. +BS  Extremities: No LE edema  Skin: Warm and dry, no jaundice or rash  Neuro: Alert & oriented x 3, Cns 2-12 intact, moves all extremities equally   Discharge Medicatios:        Discharge Medication List as of 1/16/2021  3:37 PM      START taking these medications    Details   metoprolol tartrate (LOPRESSOR) 25 MG tablet Take 0.5 tablets (12.5 mg) by mouth 2 times daily Hold for heart rate <60, Disp-30 tablet, R-0, E-Prescribe         CONTINUE these medications which have NOT CHANGED    Details   acyclovir (ZOVIRAX) 400 MG tablet Take 400 mg by mouth 2 times daily , Historical      B Complex Vitamins (VITAMIN B COMPLEX PO) Take 1 tablet by mouth daily , Historical      enoxaparin ANTICOAGULANT (LOVENOX) 100 MG/ML syringe Inject 1 mL (100 mg) Subcutaneous 2 times daily for 7 days, Disp-14 mL, R-0, Local Print      ibrutinib (IMBRUVICA) 140 MG capsule Take 280 mg by mouth daily , Historical      Multiple Vitamin (MULTIVITAMINS PO) Take 1 tablet by mouth daily , Historical      Multiple Vitamins-Minerals (PRESERVISION/LUTEIN) CAPS Take 1 capsule by mouth 2 times daily , Historical      tamsulosin (FLOMAX) 0.4 MG capsule Take 0.4 mg by mouth every evening, Historical      traZODone (DESYREL) 50 MG tablet Take 50 mg by mouth At Bedtime, Historical      vitamin C (ASCORBIC ACID) 1000 MG TABS Take 1,000 mg by mouth every other day, Historical      Vitamin D3 (CHOLECALCIFEROL) 25 mcg (1000 units) tablet Take 50 mcg by mouth every evening, Historical      warfarin ANTICOAGULANT (COUMADIN) 5 MG tablet Take 1 tablet (5 mg) by mouth daily for 14 days, Disp-14 tablet, R-0, Local Print      zinc gluconate 50 MG tablet Take 50 mg by mouth every other day, Historical             Instructions Given to Patient as Discharge:     Discharge Procedure Orders    CARDIOLOGY EVAL ADULT REFERRAL   Standing Status: Future   Referral Priority: Routine Referral Type: CV Cardio consult   Requested Specialty: Cardiovascular Disease   Number of Visits Requested: 1     Follow-Up with Electrophysiologist   Standing Status: Future Standing Exp. Date: 01/16/22   Referral Priority: Routine   Number of Visits Requested: 1     Reason for your hospital stay   Order Comments: You were hospitalized due to an elevated heart rate and new heart failure due to tachycardia-mediated cardiomyopathy.     Follow-up and recommended labs and tests    Order Comments: Please start the new blood pressure medication which helps control your heart rate.  Please continue Lovenox and warfarin and follow up in clinic this week as you already have scheduled.  You will need an INR check.  Please return on 1/18/2021 for placement of a heart rate monitor.  Please follow up with Cardiology in clinic.     Activity   Order Comments: Your activity upon discharge: activity as tolerated     Order Specific Question Answer Comments   Is discharge order? Yes      When to contact your care team   Order Comments: Notify for fever >101.5; black or bloody stools; severe, persistent, or worsening nausea, vomiting, abdominal pain or distention, diarrhea, constipation; chest pain, difficulty breathing, swelling; dizziness, weakness, lightheadedness, fainting.  Proceed to the nearest emergency room for any urgent concerns.     Leadless EKG Monitor 3 to 7 Days   Standing Status: Future Standing Exp. Date: 03/02/21     Order Specific Question Answer Comments   Patient should wear the monitor for 7 days    Schedule hook-up appt at [facility] [8]      Leadless EKG Monitor 3 to 14 Days   Standing Status: Future Standing Exp. Date: 01/16/22     Order Specific Question Answer Comments   Patient should wear the monitor for 7 days    Schedule hook-up appt at [facility] [8]      Diet   Order Comments: Follow this diet upon discharge: Orders Placed  This Encounter      Regular Diet Adult     Order Specific Question Answer Comments   Is discharge order? Yes        Pending Tests at Discharge:   none    Discharge Disposition:   Discharged to home     Jaylin LOVEC  Hospitalist Service  Pager [phone]    >30 minutes was spent in discharge planning, care coordination, physical examination and medication reconciliation on the date of discharge, 1/16/2021

## 2021-01-16 NOTE — PLAN OF CARE
PRIMARY DIAGNOSIS: PAROXYSMAL ATRIAL FIBRILLATION  OUTPATIENT/OBSERVATION GOALS TO BE MET BEFORE DISCHARGE:  1. Ruled out acute coronary syndrome (negative or stable Troponin):  Yes  2. Pain Status: Pain free.  3. Appropriate provocative testing performed: No  - Stress Test Procedure: Echocardiogram ordered for tomorrow AM  - Interpretation of cardiac rhythm per telemetry tech: atrial flutter with HR 110s    4. Cleared by Consultants (if applicable):No, cardiology consulted   5. Return to near baseline physical activity: Yes  /75 (BP Location: Right arm)   Pulse 68   Temp 97.3  F (36.3  C) (Oral)   Resp 18   Ht 1.829 m (6')   Wt 99.5 kg (219 lb 4.8 oz)   SpO2 93%   BMI 29.74 kg/m    Discharge Planner Nurse   Safe discharge environment identified: Yes  Barriers to discharge: Yes       Entered by: Jyothi Warner 01/16/2021 1:07 AM     Please review provider order for any additional goals.   Nurse to notify provider when observation goals have been met and patient is ready for discharge.  Patient alert and oriented x4. Denies pain, lightheadedness, dizziness, SOB. Up independently in room. IV SL. Tolerating regular diet. Strict intake and output. Telemetry monitoring. Echocardiogram ordered for tomorrow AM. Cardiology and SW consulted. Will continue with plan of care.

## 2021-01-16 NOTE — PROGRESS NOTES
[facility]  Internal Medicine  Progress Note    Date of Service: 1/16/2021    Patient: Emory Clark  MRN: [id number]  Admission Date: 1/15/2021  Hospital Day # 2    Assessment & Plan: Emory Clark is a 80 year old male with a history of Chronic Lymphocytic Leukeami, GERD, Insomnia, Asthma, Anemia, NSTEMI, Atrial Fibrillation who presented to the ED on 1/15/2021 with an elevated HR.    Atrial Fibrillation/Flutter with RVR - pt newly diagnosed with atrial flutter after presenting to the ED on 1/13 with elevated heart rates he noticed on his fit bit since the end of December.  He converted to NSR at that time after treatment with Lopressor and Diltiazem IV and discharged home on Lovenox bridging to Coumadin.  Pt presented back to the ED on 1/15 with persistent tachycardia.  EKG revealed atrial fibrillation.  - telemetry overnight atrial flutter with rates as high as 140s now with conversion to NSR this morning  - echocardiogram reveals atrial fibrillation with decreased EF 35-40% which is likely due to tachycardia  - last NM Lexiscan 2014 was normal  - patient not on any rate controlling medications pta; Cardiology consulted for assistance (pt reports he is unable to take Diltiazem or Verapamil due to his CLL medication interaction)  - continue pta Lovenox/Warfarin bridge with INR goal 2-3.  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  EF 35-40% down from 55-60% in 2017.  No significant fluid overload on exam.  CXR on 1/13 negative.   - Cardiology consulted    Aortic Stenosis - mild/mod noted on echocardiogram.  Will need ongoing monitoring      CLL - diagnosed 2004.  Followed by the [facility].  Currently on Ibrutinib and Acyclovir.    BPH - continue pta Flomax    Insomnia - continue pta Trazodone    CODE: full  DVT: warfarin  Diet/fluids: regular  Disposition: pending improvement in rate control and Cardiology consultation    Jaylin  Bay QUEEN PA-C  Hospitalist Physician Assistant  [facility]  Pager: [phone]      Subjective & Interval Hx:    Pt reports tachycardia noted on his fit bit yesterday after working outside.  He then felt fatigued, but denies any chest pain or shortness of breath.  Denies abdominal pain, swelling.  Tolerating po well.    Last 24 hr care team notes reviewed.   ROS:  4 point ROS including Respiratory, CV, GI and , other than that noted in the HPI, is negative.    Physical Exam:    Blood pressure 127/61, pulse 62, temperature 96.6  F (35.9  C), temperature source Oral, resp. rate 18, height 1.829 m (6'), weight 97.3 kg (214 lb 9.6 oz), SpO2 93 %.  General: Alert, interactive, NAD, lying in bed, pleasant and cooperative.  HEENT: AT/NC  Resp: clear to auscultation bilaterally, no crackles or wheezes  Cardiac: irregular rate and rhythm, no murmur  Abdomen: Soft, nontender, nondistended. +BS  Extremities: No LE edema  Skin: Warm and dry, no jaundice or rash  Neuro: Alert & oriented x 3, Cns 2-12 intact, moves all extremities equally    Labs & Images:  Reviewed in Epic   Medications:    Current Facility-Administered Medications   Medication     acetaminophen (TYLENOL) Suppository 650 mg     acetaminophen (TYLENOL) tablet 650 mg     enoxaparin ANTICOAGULANT (LOVENOX) injection 100 mg     ibrutinib (IMBRUVICA) capsule 140 mg     melatonin tablet 1 mg     metoprolol (LOPRESSOR) injection 2.5 mg     ondansetron (ZOFRAN-ODT) ODT tab 4 mg    Or     ondansetron (ZOFRAN) injection 4 mg     polyethylene glycol (MIRALAX) Packet 17 g     senna-docusate (SENOKOT-S/PERICOLACE) 8.6-50 MG per tablet 1 tablet    Or     senna-docusate (SENOKOT-S/PERICOLACE) 8.6-50 MG per tablet 2 tablet     tamsulosin (FLOMAX) capsule 0.4 mg     traZODone (DESYREL) tablet 50 mg     Warfarin Therapy Reminder (Check START DATE - warfarin may be starting in the FUTURE)

## 2021-01-18 ENCOUNTER — TELEPHONE (OUTPATIENT)
Dept: PHARMACY | Facility: CLINIC | Age: 81
End: 2021-01-18

## 2021-01-18 ENCOUNTER — DOCUMENTATION ONLY (OUTPATIENT)
Dept: NURSING | Facility: CLINIC | Age: 81
End: 2021-01-18

## 2021-01-18 DIAGNOSIS — I48.0 PAROXYSMAL ATRIAL FIBRILLATION (H): ICD-10-CM

## 2021-01-18 DIAGNOSIS — I48.0 PAROXYSMAL ATRIAL FIBRILLATION (H): Primary | ICD-10-CM

## 2021-01-18 LAB
INR PPP: 1.9 (ref 0.9–1.1)
INTERPRETATION ECG - MUSE: NORMAL

## 2021-01-18 NOTE — TELEPHONE ENCOUNTER
This patient was told to follow up with inr as scheduled but he is unclear when and where his inr is being taken.  Is it possible to reach out to patient with instructions?  Thanks so much    Breana Richardson [facility]   on behalf of [facility]

## 2021-01-18 NOTE — TELEPHONE ENCOUNTER
Spoke with this patient over the weekend - he is unclear as to when to get his inr taken and who he should contact.  I was unsure if this should be done with primary physician or other.  Is it possible to reach out to pt to advise him?  Thanks for your help    Breana Richardson [facility]   on behalf of [facility]

## 2021-01-18 NOTE — PROGRESS NOTES
ANTICOAGULATION MANAGEMENT      Emory ADINA Eduardo due for new referral for anticoagulation monitoring. Order pended for your review and signature.      ANTICOAGULATION SUMMARY      Warfarin indication(s)     Atrial fibrillation    Heart valve present?  NO       Current goal range   INR: 2.0-3.0     Goal appropriate for indication? Yes, INR 2-3 appropriate for hx of DVT, PE, hypercoagulable state, Afib, LVAD, or bileaflet AVR without risk factors     Current duration of therapy Indefinite/long term therapy   Time in Therapeutic Range (TTR)  (Goal > 60%) New patient      Office visit with referring provider's group within last year No--patient scheduled to see Dr. Humphreys on 01/20/2021       Sonya Reyna RN

## 2021-01-18 NOTE — TELEPHONE ENCOUNTER
Left  to call Day with transfer to Sonya at: [phone]    [facility] did not place INR referral so his chart was not flagged for us to call him.  INR referral routed to PCP.    Sonya Reyna RN

## 2021-01-18 NOTE — TELEPHONE ENCOUNTER
ANTICOAGULATION  MANAGEMENT: Discharge Review    Emory Clark chart reviewed for anticoagulation continuity of care    Hospital Admission on 01/15-01/16/21 for Atrial Fibrillation and ER visit on 01/13/21 for atrial flutter with RVR and ER visit at [facility] on 01/17, kept for observation overnight.    Discharge disposition: Home    Results:    Recent labs: (last 7 days)     01/13/21  1751 01/15/21  1714 01/16/21  0533   INR 0.95 1.10 1.21*     Anticoagulation inpatient management:     new start on 01/13/21    Anticoagulation discharge instructions:     Warfarin dosing: continue 5mg daily   Bridging: pt was bridged with Lovenox, it was discontinued by [facility] on 01/17/21 with a 1.9 INR   INR goal change: Yes: 2.0-3.0, new start      Medication changes affecting anticoagulation: Yes: Ibrutinib  Concurrent use of IBRUTINIB and ANTICOAGULANTS, ANTIPLATELETS may result in increased risk of bleeding.       Additional factors affecting anticoagulation: Yes: new start, CLL patient (chronic lymphoid leukemia)f    Plan     No adjustment to anticoagulation plan needed    Spoke with Franko, he will need warfarin refill in the next week    Anticoagulation Calendar updated    Sonya Reyna RN

## 2021-01-19 NOTE — PROGRESS NOTES
"Pre-Visit Planning   Next 5 appointments (look out 90 days)    Jan 20, 2021  9:30 AM  (Arrive by 9:10 AM)  Office Visit with Fabrice Humphreys MD  [facility] ([facility]) [address] [place] [id number] [phone]        Appointment Notes for this encounter:      GIVE PT WARFARIN BOOKLET AT   Per patient: hospital follow up AND POC INR    Questionnaires Reviewed/Assigned  Additional questionnaires assigned - PHQ-2, Fall Risk    Patient preferred phone number: [phone]    Chart Review:  Has not seen JM since 08/2018 05/21/2020 OV w/ K.O. for pre-op - R meniscus repair  07/09/2020 OV w/ Ortho for f/u on R knee arthroscopy  08/14/2020 OV w/ Hematology for CLL  01/13/2021 Nurse Triage note: WILLIAM advised patient go to ER due to elevated heart rate   Chest x-ray: IMPRESSION: Negative chest.   01/13/2021 ED visit for atrial flutter w/ rapid ventricular response  01/15/2021 - 01/16/2021 Hospital admission for atrial flutter   Echo: Interpretation Summary: Rhythm is atrial fibrillation.  Moderately decreased left ventricular systolic function  The visual ejection fraction is estimated at 35-40%.  Proximal septal thickening is noted.  Septal motion is consistent with conduction abnormality.  Mildly reduced RV function.  Mild-moderate aortic stenosis. Vmax 2.1 m/s, mean gradient averaged 11 mmHg,  THIAGO by continuity 1.6 cm2. DVI of 0.43. Gradient may be underestimated in  setting of \"low flow\" (Stroke volume index 27 ml/m2).  No pulmonary hypertension.  The inferior vena cava is normal.  There is no pericardial effusion.     Compared to prior study dated 6/7/2017, left ventricular function has  decreased and mild-moderate aortic stenosis is present. Rhythm is atrial  fibrillation.  01/17/2021 - 01/18/2021 [facility] ED visit for atrial flutter    PVP:   Spoke to patient via phone. Are there any additional questions or concerns " "you'd like to review with your provider during your visit? Yes: Follow-up from hospital. Due to see cardiologist at [place] in 4 weeks or sooner if needed.     Patient does not have additional questions or concerns.        Visit is not preventive.    Meds  Is there anything on your medication list that needs to be updated? Yes: There have been issues with his med list - certain drugs he cannot have due to concurrent prescription of ibrutinib.     Current Outpatient Medications   Medication     acyclovir (ZOVIRAX) 400 MG tablet     B Complex Vitamins (VITAMIN B COMPLEX PO)     enoxaparin ANTICOAGULANT (LOVENOX) 100 MG/ML syringe     ibrutinib (IMBRUVICA) 140 MG capsule     metoprolol tartrate (LOPRESSOR) 25 MG tablet     Multiple Vitamin (MULTIVITAMINS PO)     Multiple Vitamins-Minerals (PRESERVISION/LUTEIN) CAPS     tamsulosin (FLOMAX) 0.4 MG capsule     traZODone (DESYREL) 50 MG tablet     vitamin C (ASCORBIC ACID) 1000 MG TABS     Vitamin D3 (CHOLECALCIFEROL) 25 mcg (1000 units) tablet     warfarin ANTICOAGULANT (COUMADIN) 5 MG tablet     zinc gluconate 50 MG tablet     No current facility-administered medications for this visit.      Which pharmacy do you prefer to use for medications during this visit if needed? [facility] [place]     Do you need refills on any of your medications? No    Health Maintenance Due   Topic Date Due     ANNUAL REVIEW OF HM ORDERS  1940     HEPATITIS C SCREENING  09/05/1960     ASTHMA ACTION PLAN  05/15/2018     MEDICARE ANNUAL WELLNESS VISIT  01/09/2019     ASTHMA CONTROL TEST  02/16/2020     FALL RISK ASSESSMENT  08/16/2020     PHQ-2  01/01/2021     Patient is due for:  Medicare Annual Wellness Initial visit    [employer]  Patient is active on [employer].    Questionnaire Review   Advised patient to arrive early in order to complete questionnaires.    Call Summary  \"Thank you for your time today.  If anything comes up before your appointment, please feel free to contact us at " "[phone].\"    Kodi ORTIZ RN    "

## 2021-01-20 ENCOUNTER — OFFICE VISIT (OUTPATIENT)
Dept: FAMILY MEDICINE | Facility: CLINIC | Age: 81
End: 2021-01-20
Payer: COMMERCIAL

## 2021-01-20 ENCOUNTER — ANTICOAGULATION THERAPY VISIT (OUTPATIENT)
Dept: ANTICOAGULATION | Facility: CLINIC | Age: 81
End: 2021-01-20

## 2021-01-20 VITALS
SYSTOLIC BLOOD PRESSURE: 104 MMHG | BODY MASS INDEX: 29.34 KG/M2 | OXYGEN SATURATION: 97 % | DIASTOLIC BLOOD PRESSURE: 60 MMHG | WEIGHT: 216.3 LBS | HEART RATE: 50 BPM | TEMPERATURE: 97.8 F

## 2021-01-20 DIAGNOSIS — C91.11 CHRONIC LYMPHOID LEUKEMIA IN REMISSION (H): Primary | ICD-10-CM

## 2021-01-20 DIAGNOSIS — I48.0 PAROXYSMAL ATRIAL FIBRILLATION (H): ICD-10-CM

## 2021-01-20 DIAGNOSIS — D84.9 IMMUNOSUPPRESSION (H): ICD-10-CM

## 2021-01-20 DIAGNOSIS — I48.19 PERSISTENT ATRIAL FIBRILLATION (H): ICD-10-CM

## 2021-01-20 DIAGNOSIS — I42.9 CARDIOMYOPATHY, UNSPECIFIED TYPE (H): ICD-10-CM

## 2021-01-20 LAB
CAPILLARY BLOOD COLLECTION: NORMAL
INR PPP: 1.8 (ref 0.86–1.14)

## 2021-01-20 PROCEDURE — 36416 COLLJ CAPILLARY BLOOD SPEC: CPT | Performed by: FAMILY MEDICINE

## 2021-01-20 PROCEDURE — 85610 PROTHROMBIN TIME: CPT | Performed by: FAMILY MEDICINE

## 2021-01-20 PROCEDURE — 99214 OFFICE O/P EST MOD 30 MIN: CPT | Performed by: FAMILY MEDICINE

## 2021-01-20 PROCEDURE — 99207 PR NO CHARGE NURSE ONLY: CPT

## 2021-01-20 NOTE — PROGRESS NOTES
Follow up post hospital atrial fibrilation     Subjective     Franko is a 80 year old who presents to clinic today for the following health issues     HPI       ED/UC Followup:    Facility:  [facility] Emergency [facility]  Date of visit: 1/17/2021  Reason for visit: atrial flutter  Current Status: feeling okay           Review of Systems   Constitutional, HEENT, cardiovascular, pulmonary, GI, , musculoskeletal, neuro, skin, endocrine and psych systems are negative, except as otherwise noted.      Objective    He's on warfatin now with INR team     GENERAL: healthy, alert and no distress  EYES: Eyes grossly normal to inspection, PERRL and conjunctivae and sclerae normal  HENT: ear canals and TM's normal, nose and mouth without ulcers or lesions  NECK: no adenopathy, no asymmetry, masses, or scars and thyroid normal to palpation  RESP: lungs clear to auscultation - no rales, rhonchi or wheezes  CV: regular rate and rhythm, normal S1 S2, no S3 or S4, no murmur, click or rub, no peripheral edema and peripheral pulses strong  ABDOMEN: soft, nontender, no hepatosplenomegaly, no masses and bowel sounds normal  MS: no gross musculoskeletal defects noted, no edema  SKIN: no suspicious lesions or rashes  NEURO: Normal strength and tone, mentation intact and speech normal  PSYCH: mentation appears normal, affect normal/bright    (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)  (primary encounter diagnosis)  Comment:   Plan: INR, Capillary Blood Collection            (I48.19) Persistent atrial fibrillation (H)  Comment:   Plan: INR, CANCELED: INR point of care            (I42.9) Cardiomyopathy, unspecified type (H)  Comment:   Plan:     (D84.9) Immunosuppression (H)  Comment:   Plan:   Current Outpatient Medications   Medication     acyclovir (ZOVIRAX) 400 MG tablet     B Complex Vitamins (VITAMIN B COMPLEX PO)     enoxaparin ANTICOAGULANT (LOVENOX) 100 MG/ML syringe     ibrutinib (IMBRUVICA) 140 MG capsule      metoprolol tartrate (LOPRESSOR) 25 MG tablet     Multiple Vitamin (MULTIVITAMINS PO)     Multiple Vitamins-Minerals (PRESERVISION/LUTEIN) CAPS     tamsulosin (FLOMAX) 0.4 MG capsule     traZODone (DESYREL) 50 MG tablet     vitamin C (ASCORBIC ACID) 1000 MG TABS     Vitamin D3 (CHOLECALCIFEROL) 25 mcg (1000 units) tablet     warfarin ANTICOAGULANT (COUMADIN) 5 MG tablet     zinc gluconate 50 MG tablet     No current facility-administered medications for this visit.      Follow up INR five days,  [facility] [place]

## 2021-01-20 NOTE — PROGRESS NOTES
ANTICOAGULATION INITIAL CLINIC VISIT    Patient Name:  Emory Clark  Date:  2021  Referred by: Fabrice Humphreys MD  Contact Type:  Telephone/ Called patient, he reports medication change, no Lovenox. Questions were answered that the patient had regarding diet. Orders discussed with the patient, he agrees with the plan. Lab INR appointment scheduled on 21.    SUBJECTIVE:  Coumadin education was completed today.  Topics covered include:  -Introduction to coumadin  -Proper Administration  -INR Testing  -Sign/Symptoms of Bleeding  -Signs/Symptoms of Clot Formation or Stroke  -Dietary Intake of Vitamin K  -Drug Interactions  -Anticoagulation Identification (bracelet, necklace or wallet card)  -Future Surgery  -Effects of Alcohol, Tobacco, and Exercise on Coumadin    Coumadin Education Booklet and Coumadin Identification Wallet Card were given to the patient.    Patient Findings     Positives:  Change in medications (Patient reports he has been off Lovenox since 21 ED visit, ), Change in diet/appetite (Patient reports he drinks cranberry juice every day. )    Comments:  Called patient, he reports no Lovenox since discharged from ED on 21. Vitamin K foods list mailed to patient.  Dosing instruction was discussed with Debra Ngo RPH        Clinical Outcomes     Comments:  Called patient, he reports no Lovenox since discharged from ED on 21. Vitamin K foods list mailed to patient.  Dosing instruction was discussed with Debra Ngo RPH          OBJECTIVE    Recent labs: (last 7 days)     21  0956   INR 1.80*       ASSESSMENT / PLAN  INR assessment SUB    Recheck INR In: 2 DAYS    INR Location Outside lab      Anticoagulation Summary  As of 2021    INR goal:  2.0-3.0   TTR:  --   INR used for dosin.80 (2021)   Warfarin maintenance plan:  7.5 mg (5 mg x 1.5) every Wed; 5 mg (5 mg x 1) all other days   Full warfarin instructions:  7.5 mg every Wed; 5 mg all other days    Weekly warfarin total:  37.5 mg   Plan last modified:  Debra Ngo RP (1/20/2021)   Next INR check:  1/22/2021   [place] end date:  Indefinite    Indications    Paroxysmal atrial fibrillation (H) [I48.0]             Anticoagulation Episode Summary     INR check location:      Preferred lab:      Send INR reminders to:  [facility] [place]    Comments:        Anticoagulation Care Providers     Provider Role Specialty Phone number    Fabrice Humphreys MD Referring Family Medicine [phone]            See the Encounter Report to view Anticoagulation Flowsheet and Dosing Calendar (Go to Encounters tab in chart review, and find the Anticoagulation Therapy Visit)    Dosage adjustment made based on physician directed care plan.    Olga Olvera RN

## 2021-01-20 NOTE — PROGRESS NOTES
Anticoagulation Management    Reviewed inpatient dosing, INRs, and discharge dosing. Drug-drug interaction of importance to note in this patient is ibrutinib; the interaction with warfarin increases the risk of major hemorrhage. Given this factor will look to keep INR on the lower end of therapeutic goal. Patient cotinines on enoxaparin bridge until INR > 2.     Plan for slight increase (7.1%) of maintenance dose today within protocol to achieve target INR > 2.     Debra Ngo, [facility]

## 2021-01-21 ENCOUNTER — DOCUMENTATION ONLY (OUTPATIENT)
Dept: LAB | Facility: CLINIC | Age: 81
End: 2021-01-21

## 2021-01-21 DIAGNOSIS — I48.0 PAROXYSMAL ATRIAL FIBRILLATION (H): Primary | ICD-10-CM

## 2021-01-21 NOTE — PROGRESS NOTES
This patient is starting INR's and an order is needed. [place] INR nurses have been asked. If this is something you can do , thanks Domenica in lab at [place]

## 2021-01-22 ENCOUNTER — ANTICOAGULATION THERAPY VISIT (OUTPATIENT)
Dept: NURSING | Facility: CLINIC | Age: 81
End: 2021-01-22

## 2021-01-22 DIAGNOSIS — I48.0 PAROXYSMAL ATRIAL FIBRILLATION (H): ICD-10-CM

## 2021-01-22 LAB
CAPILLARY BLOOD COLLECTION: NORMAL
INR PPP: 1.7 (ref 0.86–1.14)

## 2021-01-22 PROCEDURE — 85610 PROTHROMBIN TIME: CPT | Performed by: FAMILY MEDICINE

## 2021-01-22 PROCEDURE — 99207 PR NO CHARGE NURSE ONLY: CPT

## 2021-01-22 PROCEDURE — 36416 COLLJ CAPILLARY BLOOD SPEC: CPT | Performed by: FAMILY MEDICINE

## 2021-01-22 RX ORDER — WARFARIN SODIUM 5 MG/1
TABLET ORAL
Qty: 40 TABLET | Refills: 1 | Status: SHIPPED | OUTPATIENT
Start: 2021-01-22 | End: 2021-02-10

## 2021-01-22 NOTE — PROGRESS NOTES
Anticoagulation Management    Unable to reach Franko today.    Today's INR result of 1.7 is subtherapeutic (goal INR of 2.0-3.0).  Result received from: Clinic Lab    Follow up required to confirm warfarin dose taken and assess for changes    No instructions provided. Unable to leave voicemail.   Will call again later today      Anticoagulation clinic to follow up    Sonya Reyna RN    ANTICOAGULATION FOLLOW-UP CLINIC VISIT    Patient Name:  Emory Clark  Date:  2021  Contact Type:  Telephone--spoke to pt    SUBJECTIVE:  Patient Findings     Positives:  Change in health (Intermittent feeling of light-headedness.  Pt states his b/p was recently lower than usual (114/68).  I informed pt to make sure he is staying well hydrated and moving slowly with position changes.)    Comments:  Warfarin maintenance dose was increased 7% on  and INR dropped so I increased 13%.  Plan discussed with ACC Debra Kelly, since 13% is outside protocol recommendation of 0-10%.  Patient did not receive the vitamin K information sheet so I mailed that and an ID card to him (patient has the Guide to Warfarin booklet).          Clinical Outcomes     Negatives:  Major bleeding event, Thromboembolic event, Anticoagulation-related hospital admission, Anticoagulation-related ED visit, Anticoagulation-related fatality    Comments:  Warfarin maintenance dose was increased 7% on  and INR dropped so I increased 13%.  Plan discussed with ACC Debra Kelly, since 13% is outside protocol recommendation of 0-10%.  Patient did not receive the vitamin K information sheet so I mailed that and an ID card to him (patient has the Guide to Warfarin booklet).             OBJECTIVE    Recent labs: (last 7 days)     21  1005   INR 1.70*       ASSESSMENT / PLAN  INR assessment SUB    Recheck INR In: 4 DAYS    INR Location Clinic      Anticoagulation Summary  As of 2021    INR goal:  2.0-3.0   TTR:  --   INR used for dosin.70 (2021)    Warfarin maintenance plan:  7.5 mg (5 mg x 1.5) every Mon, Wed, Fri; 5 mg (5 mg x 1) all other days   Full warfarin instructions:  7.5 mg every Mon, Wed, Fri; 5 mg all other days   Weekly warfarin total:  42.5 mg   Plan last modified:  Sonya Reyna RN (1/22/2021)   Next INR check:  1/26/2021   Priority:  High   Target end date:  Indefinite    Indications    Paroxysmal atrial fibrillation (H) [I48.0]             Anticoagulation Episode Summary     INR check location:      Preferred lab:      Send INR reminders to:  [facility] [employer] [place]    Comments:        Anticoagulation Care Providers     Provider Role Specialty Phone number    Fabrice Humphreys MD Referring Family Medicine [phone]            See the Encounter Report to view Anticoagulation Flowsheet and Dosing Calendar (Go to Encounters tab in chart review, and find the Anticoagulation Therapy Visit)        Sonya Reyna RN

## 2021-01-26 ENCOUNTER — ANTICOAGULATION THERAPY VISIT (OUTPATIENT)
Dept: NURSING | Facility: CLINIC | Age: 81
End: 2021-01-26

## 2021-01-26 DIAGNOSIS — I48.0 PAROXYSMAL ATRIAL FIBRILLATION (H): ICD-10-CM

## 2021-01-26 LAB
CAPILLARY BLOOD COLLECTION: NORMAL
INR PPP: 2.2 (ref 0.86–1.14)

## 2021-01-26 PROCEDURE — 85610 PROTHROMBIN TIME: CPT | Performed by: FAMILY MEDICINE

## 2021-01-26 PROCEDURE — 99207 PR NO CHARGE NURSE ONLY: CPT

## 2021-01-26 PROCEDURE — 36416 COLLJ CAPILLARY BLOOD SPEC: CPT | Performed by: FAMILY MEDICINE

## 2021-01-26 NOTE — PROGRESS NOTES
ANTICOAGULATION FOLLOW-UP CLINIC VISIT    Patient Name:  Emory Clark  Date:  2021  Contact Type:  Telephone    SUBJECTIVE:  Patient Findings     Positives:  Change in health (Pulse at 47 but with moving around while talking on the phone, yves >60.  Pt has been taking too much Metoprolol, will hold dose tonight and start 12.5mg BID dosing on 21.), Change in medications (Pt forgot to decrease Metoprolol dose to 12.5mg BID, he's been taking 25mg BID.  Per current med list and recent hospitalization, pt is to take 12.5mg BID but HOLD dose if pulse is <60.)        Clinical Outcomes     Negatives:  Major bleeding event, Thromboembolic event, Anticoagulation-related hospital admission, Anticoagulation-related ED visit, Anticoagulation-related fatality           OBJECTIVE    Recent labs: (last 7 days)     21  1439   INR 2.20*       ASSESSMENT / PLAN  INR assessment THER    Recheck INR In: 3 DAYS    INR Location Clinic      Anticoagulation Summary  As of 2021    INR goal:  2.0-3.0   TTR:  --   INR used for dosin.20 (2021)   Warfarin maintenance plan:  7.5 mg (5 mg x 1.5) every Mon, Wed, Fri; 5 mg (5 mg x 1) all other days   Full warfarin instructions:  7.5 mg every Mon, Wed, Fri; 5 mg all other days   Weekly warfarin total:  42.5 mg   Plan last modified:  Sonya Reyna RN (2021)   Next INR check:  2021   Priority:  High   Target end date:  Indefinite    Indications    Paroxysmal atrial fibrillation (H) [I48.0]             Anticoagulation Episode Summary     INR check location:      Preferred lab:      Send INR reminders to:  [facility] [place]    Comments:        Anticoagulation Care Providers     Provider Role Specialty Phone number    Fabrice Humphreys MD Referring Family Medicine [phone]            See the Encounter Report to view Anticoagulation Flowsheet and Dosing Calendar (Go to Encounters tab in chart review, and find the Anticoagulation Therapy  Visit)        Sonya Reyna RN

## 2021-01-29 ENCOUNTER — ANTICOAGULATION THERAPY VISIT (OUTPATIENT)
Dept: NURSING | Facility: CLINIC | Age: 81
End: 2021-01-29

## 2021-01-29 DIAGNOSIS — I48.0 PAROXYSMAL ATRIAL FIBRILLATION (H): ICD-10-CM

## 2021-01-29 LAB
CAPILLARY BLOOD COLLECTION: NORMAL
INR PPP: 2.3 (ref 0.86–1.14)

## 2021-01-29 PROCEDURE — 85610 PROTHROMBIN TIME: CPT | Performed by: FAMILY MEDICINE

## 2021-01-29 PROCEDURE — 99207 PR NO CHARGE LOS: CPT

## 2021-01-29 PROCEDURE — 36416 COLLJ CAPILLARY BLOOD SPEC: CPT | Performed by: FAMILY MEDICINE

## 2021-01-29 NOTE — PROGRESS NOTES
ANTICOAGULATION FOLLOW-UP     Patient Name:  Emory Clark  Date:  2021  Contact Type:  Telephone    SUBJECTIVE:  Patient Findings     Comments:  The patient was assessed for   diet, medication,   missed or extra doses,   bruising or bleeding,   with no problem findings.  Reviewed previous warfarin dosing with patient.  He took warfarin as instructed.    INR is therapeutic today.   Patient will continue same maintenance dose.   Follow up in 4 days.    He is getting his first Covid vaccine on Sat 21.        Clinical Outcomes     Comments:  The patient was assessed for   diet, medication,   missed or extra doses,   bruising or bleeding,   with no problem findings.  Reviewed previous warfarin dosing with patient.  He took warfarin as instructed.    INR is therapeutic today.   Patient will continue same maintenance dose.   Follow up in 4 days.    He is getting his first Covid vaccine on Sat 21.           OBJECTIVE    Recent labs: (last 7 days)     21  1117   INR 2.30*       ASSESSMENT / PLAN  INR assessment THER    Recheck INR In: 4 DAYS    INR Location Clinic lab     Anticoagulation Summary  As of 2021    INR goal:  2.0-3.0   TTR:  100.0 % (1 d)   INR used for dosin.30 (2021)   Warfarin maintenance plan:  7.5 mg (5 mg x 1.5) every Mon, Wed, Fri; 5 mg (5 mg x 1) all other days   Full warfarin instructions:  7.5 mg every Mon, Wed, Fri; 5 mg all other days   Weekly warfarin total:  42.5 mg   No change documented:  Anaid Corral RN   Plan last modified:  Sonya Reyna RN (2021)   Next INR check:  2021   Priority:  High   Target end date:  Indefinite    Indications    Paroxysmal atrial fibrillation (H) [I48.0]             Anticoagulation Episode Summary     INR check location:      Preferred lab:      Send INR reminders to:  [facility] [place]    Comments:  He would like to go to the  lab - closer to his home      Anticoagulation Care Providers     Provider Role  Specialty Phone number    Fabrice Humphreys MD Referring Family Medicine [phone]            See the Encounter Report to view Anticoagulation Flowsheet and Dosing Calendar (Go to Encounters tab in chart review, and find the Anticoagulation Therapy Visit)        Anaid Corral RN

## 2021-01-30 ENCOUNTER — IMMUNIZATION (OUTPATIENT)
Dept: NURSING | Facility: CLINIC | Age: 81
End: 2021-01-30
Payer: COMMERCIAL

## 2021-01-30 PROCEDURE — 91300 PR COVID VAC PFIZER DIL RECON 30 MCG/0.3 ML IM: CPT

## 2021-01-30 PROCEDURE — 0001A PR COVID VAC PFIZER DIL RECON 30 MCG/0.3 ML IM: CPT

## 2021-02-02 ENCOUNTER — ANTICOAGULATION THERAPY VISIT (OUTPATIENT)
Dept: ANTICOAGULATION | Facility: CLINIC | Age: 81
End: 2021-02-02

## 2021-02-02 DIAGNOSIS — I48.0 PAROXYSMAL ATRIAL FIBRILLATION (H): ICD-10-CM

## 2021-02-02 LAB
CAPILLARY BLOOD COLLECTION: NORMAL
INR PPP: 2.3 (ref 0.86–1.14)

## 2021-02-02 PROCEDURE — 36416 COLLJ CAPILLARY BLOOD SPEC: CPT | Performed by: FAMILY MEDICINE

## 2021-02-02 PROCEDURE — 85610 PROTHROMBIN TIME: CPT | Performed by: FAMILY MEDICINE

## 2021-02-02 PROCEDURE — 99207 PR NO CHARGE NURSE ONLY: CPT

## 2021-02-02 NOTE — PROGRESS NOTES
ANTICOAGULATION FOLLOW-UP CLINIC VISIT    Patient Name:  Emory Clark  Date:  2021  Contact Type:  Telephone/ discussed with patient    SUBJECTIVE:  Patient Findings     Positives:  Change in diet/appetite (When warfarin was initiated, patient did cut back on his green veggie intake.  Encouraged him to try to slowly add in more green veggies to his diet (about 1 extra helping a week).)    Comments:  The patient was assessed for medication, and activity level changes, missed or extra doses, bruising or bleeding, with no problem findings.          Clinical Outcomes     Negatives:  Major bleeding event, Thromboembolic event, Anticoagulation-related hospital admission, Anticoagulation-related ED visit, Anticoagulation-related fatality    Comments:  The patient was assessed for medication, and activity level changes, missed or extra doses, bruising or bleeding, with no problem findings.             OBJECTIVE    Recent labs: (last 7 days)     21  0837   INR 2.30*       ASSESSMENT / PLAN  INR assessment THER    Recheck INR In: 1 WEEK    INR Location Clinic      Anticoagulation Summary  As of 2021    INR goal:  2.0-3.0   TTR:  100.0 % (5 d)   INR used for dosin.30 (2021)   Warfarin maintenance plan:  7.5 mg (5 mg x 1.5) every Mon, Wed, Fri; 5 mg (5 mg x 1) all other days   Full warfarin instructions:  7.5 mg every Mon, Wed, Fri; 5 mg all other days   Weekly warfarin total:  42.5 mg   No change documented:  Doris Cantrell RN   Plan last modified:  Sonya Reyna RN (2021)   Next INR check:  2021   Priority:  High   Target end date:  Indefinite    Indications    Paroxysmal atrial fibrillation (H) [I48.0]             Anticoagulation Episode Summary     INR check location:      Preferred lab:      Send INR reminders to:  [facility] [place]    Comments:  He would like to go to the  lab - closer to his home      Anticoagulation Care Providers     Provider Role Specialty Phone number     Fabrice Humphreys MD Referring Family Medicine [phone]            See the Encounter Report to view Anticoagulation Flowsheet and Dosing Calendar (Go to Encounters tab in chart review, and find the Anticoagulation Therapy Visit)    Dosage adjustment made based on physician directed care plan.    Doris Cantrell RN

## 2021-02-09 ENCOUNTER — ANTICOAGULATION THERAPY VISIT (OUTPATIENT)
Dept: NURSING | Facility: CLINIC | Age: 81
End: 2021-02-09

## 2021-02-09 ENCOUNTER — TELEPHONE (OUTPATIENT)
Dept: FAMILY MEDICINE | Facility: CLINIC | Age: 81
End: 2021-02-09

## 2021-02-09 DIAGNOSIS — I48.0 PAROXYSMAL ATRIAL FIBRILLATION (H): ICD-10-CM

## 2021-02-09 LAB
CAPILLARY BLOOD COLLECTION: NORMAL
INR PPP: 2.2 (ref 0.86–1.14)

## 2021-02-09 PROCEDURE — 36416 COLLJ CAPILLARY BLOOD SPEC: CPT | Performed by: FAMILY MEDICINE

## 2021-02-09 PROCEDURE — 99207 PR NO CHARGE NURSE ONLY: CPT

## 2021-02-09 PROCEDURE — 85610 PROTHROMBIN TIME: CPT | Performed by: FAMILY MEDICINE

## 2021-02-09 NOTE — PROGRESS NOTES
ANTICOAGULATION FOLLOW-UP CLINIC VISIT    Patient Name:  Emory Clark  Date:  2021  Contact Type:  Telephone    SUBJECTIVE:  Patient Findings     Positives:  Change in health (Pulse running 80-90's, was 144 on  but came down <100. I will send note to PCP as pt has been self-dosing his Metoprolol.), Change in medications (Pt recently increased Metoprolol on his own to 25mg in a.m. and 12.5mg in p.m. (was recenlty taking 50mg QD then decreased to 12.5mg BID x 1 day--I will send note to PCP to clarify,.), Change in diet/appetite (Ate 2 small servings of green beans and peas, he states he will average 3 small servings of greens per week.)    Comments:  Patient has been taking the 7.5mg warfarin in split doses (5mg in a.m., 2.5mg in p.m.), I informed patient that he must take the 7.5mg in full at the same time everyday, patient verbalized understanding.        Clinical Outcomes     Negatives:  Major bleeding event, Thromboembolic event, Anticoagulation-related hospital admission, Anticoagulation-related ED visit, Anticoagulation-related fatality    Comments:  Patient has been taking the 7.5mg warfarin in split doses (5mg in a.m., 2.5mg in p.m.), I informed patient that he must take the 7.5mg in full at the same time everyday, patient verbalized understanding.           OBJECTIVE    Recent labs: (last 7 days)     21  1111   INR 2.20*       ASSESSMENT / PLAN  INR assessment THER    Recheck INR In: 10 DAYS    INR Location Clinic      Anticoagulation Summary  As of 2021    INR goal:  2.0-3.0   TTR:  100.0 % (1.7 wk)   INR used for dosin.20 (2021)   Warfarin maintenance plan:  7.5 mg (5 mg x 1.5) every Mon, Wed, Fri; 5 mg (5 mg x 1) all other days   Full warfarin instructions:  7.5 mg every Mon, Wed, Fri; 5 mg all other days   Weekly warfarin total:  42.5 mg   Plan last modified:  Sonya Reyna RN (2021)   Next INR check:  2021   Priority:  High   Target end date:  Indefinite     Indications    Paroxysmal atrial fibrillation (H) [I48.0]             Anticoagulation Episode Summary     INR check location:      Preferred lab:      Send INR reminders to:  [facility] [place]    Comments:  He would like to go to the  lab - closer to his home      Anticoagulation Care Providers     Provider Role Specialty Phone number    Fabrice Humphreys MD Referring Family Medicine [phone]            See the Encounter Report to view Anticoagulation Flowsheet and Dosing Calendar (Go to Encounters tab in chart review, and find the Anticoagulation Therapy Visit)        Sonya Reyna RN

## 2021-02-09 NOTE — TELEPHONE ENCOUNTER
Shivani Humphreys,    Patient has been changing his Metoprolol dose without MD instruction to do so. He had been taking 50mg every day then was advised by [place] to change to 12.5mg BID (due to low pulse rate of 40-50), patient only followed that dosing x 1 week.  Patient states his recent B/P's have been 130's/70's and   Pulse 80-95 so he changed his dose to 25mg in the a.m. and 12.5mg in the p.m. He also got 1 reading of 144.  Patient states [place] advised him to get instruction/advice from his PCP.    Please route call back note to triage.    Thank you,  Sonya Reyna RN

## 2021-02-10 ENCOUNTER — TELEPHONE (OUTPATIENT)
Dept: FAMILY MEDICINE | Facility: CLINIC | Age: 81
End: 2021-02-10

## 2021-02-10 DIAGNOSIS — I48.0 PAROXYSMAL ATRIAL FIBRILLATION (H): ICD-10-CM

## 2021-02-10 RX ORDER — WARFARIN SODIUM 5 MG/1
TABLET ORAL
Qty: 108 TABLET | Refills: 0 | Status: SHIPPED | OUTPATIENT
Start: 2021-02-10 | End: 2024-08-04 | Stop reason: ALTCHOICE

## 2021-02-10 RX ORDER — METOPROLOL TARTRATE 25 MG/1
12.5 TABLET, FILM COATED ORAL 2 TIMES DAILY
Qty: 30 TABLET | Refills: 0 | Status: CANCELLED | OUTPATIENT
Start: 2021-02-10

## 2021-02-10 RX ORDER — METOPROLOL TARTRATE 25 MG/1
TABLET, FILM COATED ORAL
Qty: 30 TABLET | Refills: 0 | Status: CANCELLED | OUTPATIENT
Start: 2021-02-10

## 2021-02-10 NOTE — TELEPHONE ENCOUNTER
Routed back to INR pool, see pt request now for warfarin refill, started new encounter for metoprolol issue  Alie Wick RN, BSN  Message handled by CLINIC NURSE.

## 2021-02-10 NOTE — TELEPHONE ENCOUNTER
"Called pt to discuss,     ~see note from pt and INR, discussed at length, pt has multiple questions and concerns re: work up, metoprolol dosing, BP and pulse verifying, pt has cardiac tests scheduled with [facility] on 2/25/21 and follow up appointment at [facility] with cardiologist on 3/4/2021 to discuss    ~pt has self adjusted metoprolol dose, now taking 25 mg qam and 12.5 mg every pm, will need new prescription sent  ~encouraged pt to contact [facility] cardiology department as they are managing at this time  ~informs P was elevated 3 weeks ago, 144, also had lows but now averaging 60-70    **PT WANTS FERNANDEZ BENTLEY MD TO ADVISE ON THIS, ROUTED TO FERNANDEZ BENTLEY MD, PLEASE CONFIRM, VIRTUAL OR PHONE VISIT TO SORT OUT?, ROUTE TO INFORM PT OF PLAN      \"Shivani Bentley,     Patient has been changing his Metoprolol dose without MD instruction to do so. He had been taking 50mg every day then was advised by [facility] to change to 12.5mg BID (due to low pulse rate of 40-50), patient only followed that dosing x 1 week.  Patient states his recent B/P's have been 130's/70's and   Pulse 80-95 so he changed his dose to 25mg in the a.m. and 12.5mg in the p.m. He also got 1 reading of 144.  Patient states [facility] advised him to get instruction/advice from his PCP.     Please route call back note to triage.     Thank you,  Sonya Reyna RN\"   Alie Wick RN, BSN  Message handled by CLINIC NURSE.    "

## 2021-02-10 NOTE — TELEPHONE ENCOUNTER
Discussed with Fabrice Humphreys MD, agrees for pt to contact [facility] cardiologist for input and have them direct plan and metoprolol dose moving forward, called pt, informed, agrees, FYI to MD Alie WHITMAN, RN, BSN  Message handled by Nurse Triage with [facility] - provider name: WILLIAM.

## 2021-02-19 ENCOUNTER — ANTICOAGULATION THERAPY VISIT (OUTPATIENT)
Dept: NURSING | Facility: CLINIC | Age: 81
End: 2021-02-19

## 2021-02-19 DIAGNOSIS — I48.0 PAROXYSMAL ATRIAL FIBRILLATION (H): ICD-10-CM

## 2021-02-19 LAB
CAPILLARY BLOOD COLLECTION: NORMAL
INR PPP: 3.5 (ref 0.86–1.14)

## 2021-02-19 PROCEDURE — 99207 PR NO CHARGE NURSE ONLY: CPT

## 2021-02-19 PROCEDURE — 85610 PROTHROMBIN TIME: CPT | Performed by: FAMILY MEDICINE

## 2021-02-19 PROCEDURE — 36416 COLLJ CAPILLARY BLOOD SPEC: CPT | Performed by: FAMILY MEDICINE

## 2021-02-19 NOTE — PROGRESS NOTES
ANTICOAGULATION FOLLOW-UP CLINIC VISIT    Patient Name:  Emory Clark  Date:  2/19/2021  Contact Type:  Telephone/ with pt and wife, Mary    SUBJECTIVE:  Patient Findings     Positives:  Change in activity (walking more on the treadmill and sometimes outside when it's not too cold out.), Change in diet/appetite (Had 2 servings of greens in the past week, pt had been averaging 3 servings per week.  Pt also states he cut back on portion size because he thought he was supposed to.  We have had several, lengthy discussions on vitamin K intake.)    Comments:  List of vitamin K foods was mailed to patient after last INR visit, patient's wife also joined our conversation today.  Patient will proceed with 3 servings of greens per week, has been eating butter lettuce but may switch to mixed greens and possibly green beans and broccoli.  No change in warfarin maintenance dose today since greens have been inconsistent and last 4 INRs have been therapeutic in low end of range.        Clinical Outcomes     Negatives:  Major bleeding event, Thromboembolic event, Anticoagulation-related hospital admission, Anticoagulation-related ED visit, Anticoagulation-related fatality    Comments:  List of vitamin K foods was mailed to patient after last INR visit, patient's wife also joined our conversation today.  Patient will proceed with 3 servings of greens per week, has been eating butter lettuce but may switch to mixed greens and possibly green beans and broccoli.  No change in warfarin maintenance dose today since greens have been inconsistent and last 4 INRs have been therapeutic in low end of range.           OBJECTIVE    Recent labs: (last 7 days)     02/19/21  1108   INR 3.50*       ASSESSMENT / PLAN  INR assessment SUPRA    Recheck INR In: 10 DAYS    INR Location Clinic      Anticoagulation Summary  As of 2/19/2021    INR goal:  2.0-3.0   TTR:  82.9 % (3.1 wk)   INR used for dosing:  3.50 (2/19/2021)   Warfarin maintenance  plan:  7.5 mg (5 mg x 1.5) every Mon, Wed, Fri; 5 mg (5 mg x 1) all other days   Full warfarin instructions:  2/19: 5 mg; Otherwise 7.5 mg every Mon, Wed, Fri; 5 mg all other days   Weekly warfarin total:  42.5 mg   Plan last modified:  Sonya Reyna RN (2/19/2021)   Next INR check:  3/1/2021   Priority:  High   Target end date:  Indefinite    Indications    Paroxysmal atrial fibrillation (H) [I48.0]             Anticoagulation Episode Summary     INR check location:      Preferred lab:      Send INR reminders to:  [facility]    Comments:  He would like to go to the  lab - closer to his home      Anticoagulation Care Providers     Provider Role Specialty Phone number    Fabrice Humphreys MD Referring Family Medicine [phone]            See the Encounter Report to view Anticoagulation Flowsheet and Dosing Calendar (Go to Encounters tab in chart review, and find the Anticoagulation Therapy Visit)        Sonya Reyna RN

## 2021-02-20 ENCOUNTER — IMMUNIZATION (OUTPATIENT)
Dept: NURSING | Facility: CLINIC | Age: 81
End: 2021-02-20
Payer: COMMERCIAL

## 2021-02-20 PROCEDURE — 91300 PR COVID VAC PFIZER DIL RECON 30 MCG/0.3 ML IM: CPT

## 2021-02-20 PROCEDURE — 0002A PR COVID VAC PFIZER DIL RECON 30 MCG/0.3 ML IM: CPT

## 2021-03-01 ENCOUNTER — ANTICOAGULATION THERAPY VISIT (OUTPATIENT)
Dept: ANTICOAGULATION | Facility: CLINIC | Age: 81
End: 2021-03-01

## 2021-03-01 DIAGNOSIS — I48.0 PAROXYSMAL ATRIAL FIBRILLATION (H): ICD-10-CM

## 2021-03-01 LAB
CAPILLARY BLOOD COLLECTION: NORMAL
INR PPP: 1.9 (ref 0.86–1.14)

## 2021-03-01 PROCEDURE — 85610 PROTHROMBIN TIME: CPT | Performed by: FAMILY MEDICINE

## 2021-03-01 PROCEDURE — 36416 COLLJ CAPILLARY BLOOD SPEC: CPT | Performed by: FAMILY MEDICINE

## 2021-03-01 PROCEDURE — 99207 PR NO CHARGE NURSE ONLY: CPT

## 2021-03-01 NOTE — PROGRESS NOTES
ANTICOAGULATION FOLLOW-UP CLINIC VISIT    Patient Name:  Emory Clark  Date:  3/1/2021  Contact Type:  Telephone    SUBJECTIVE:  Patient Findings     Comments:  The patient was assessed for diet, medication, and activity level changes, missed or extra doses, bruising or bleeding, with no problem findings.          Clinical Outcomes     Comments:  The patient was assessed for diet, medication, and activity level changes, missed or extra doses, bruising or bleeding, with no problem findings.             OBJECTIVE    Recent labs: (last 7 days)     21  1335   INR 1.90*       ASSESSMENT / PLAN  INR assessment SUB    Recheck INR In: 2 WEEKS    INR Location Clinic      Anticoagulation Summary  As of 3/1/2021    INR goal:  2.0-3.0   TTR:  76.6 % (1.1 mo)   INR used for dosin.90 (3/1/2021)   Warfarin maintenance plan:  7.5 mg (5 mg x 1.5) every Mon, Wed, Fri; 5 mg (5 mg x 1) all other days   Full warfarin instructions:  3/1: 10 mg; Otherwise 7.5 mg every Mon, Wed, Fri; 5 mg all other days   Weekly warfarin total:  42.5 mg   Plan last modified:  Sonya Reyna RN (2021)   Next INR check:  3/15/2021   Priority:  High   Target end date:  Indefinite    Indications    Paroxysmal atrial fibrillation (H) [I48.0]             Anticoagulation Episode Summary     INR check location:      Preferred lab:      Send INR reminders to:  [facility]    Comments:  He would like to go to the  lab - closer to his home      Anticoagulation Care Providers     Provider Role Specialty Phone number    Fabrice Humphreys MD Referring Family Medicine [phone]            See the Encounter Report to view Anticoagulation Flowsheet and Dosing Calendar (Go to Encounters tab in chart review, and find the Anticoagulation Therapy Visit)        Lola De La Trore, RN

## 2021-03-22 ENCOUNTER — DOCUMENTATION ONLY (OUTPATIENT)
Dept: NURSING | Facility: CLINIC | Age: 81
End: 2021-03-22

## 2021-03-22 ENCOUNTER — TELEPHONE (OUTPATIENT)
Dept: NURSING | Facility: CLINIC | Age: 81
End: 2021-03-22

## 2021-03-22 NOTE — PROGRESS NOTES
ANTICOAGULATION  MANAGEMENT    Emory Clark is being discharged from the [facility] Anticoagulation Management Program ([facility]).    Reason for discharge: warfarin replaced by alternate therapy, Switched to Eliquis on 03/04/21 per [facility]    Anticoagulation episode resolved, [facility] referral closed and INR Standing order discontinued    If patient needs warfarin management in the future, please send a new referral     Sonya Reyna RN

## 2021-03-22 NOTE — TELEPHONE ENCOUNTER
Noted    ANTICOAGULATION  MANAGEMENT    Emory Clark is being discharged from the [facility] ([facility]).    Reason for discharge: warfarin replaced by alternate therapy, Eliquis started 03/04/21 per [facility]    [facility] referral closed and INR Standing order discontinued    If patient needs warfarin management in the future, please send a new referral   Sonya Reyna RN

## 2021-03-22 NOTE — TELEPHONE ENCOUNTER
ANTICOAGULATION     Emory Clark is overdue for INR check.      Left message  reminding patient to check INR with their home meter and call results to the home monitoring company as soon as possible. Patient cancelled his 3/15/21 INR appt.    Sonya Reyna RN

## 2021-03-22 NOTE — TELEPHONE ENCOUNTER
Patient calls,    States he started Eliquis on March 4th, and is no longer taking Warfarin , patient states [facility] told him he no longer needs INR draws, routing to Sonya Reyna RN to inform    Christopher Valencia RN

## 2021-04-24 ENCOUNTER — HEALTH MAINTENANCE LETTER (OUTPATIENT)
Age: 81
End: 2021-04-24

## 2021-08-13 ENCOUNTER — THERAPY VISIT (OUTPATIENT)
Dept: PHYSICAL THERAPY | Facility: CLINIC | Age: 81
End: 2021-08-13
Payer: COMMERCIAL

## 2021-08-13 DIAGNOSIS — M25.562 LEFT KNEE PAIN: ICD-10-CM

## 2021-08-13 PROCEDURE — 97140 MANUAL THERAPY 1/> REGIONS: CPT | Mod: GP | Performed by: PHYSICAL THERAPIST

## 2021-08-13 PROCEDURE — 97162 PT EVAL MOD COMPLEX 30 MIN: CPT | Mod: GP | Performed by: PHYSICAL THERAPIST

## 2021-08-13 PROCEDURE — 97110 THERAPEUTIC EXERCISES: CPT | Mod: GP | Performed by: PHYSICAL THERAPIST

## 2021-08-13 ASSESSMENT — ACTIVITIES OF DAILY LIVING (ADL)
RISE FROM A CHAIR: ACTIVITY IS NOT DIFFICULT
RAW_SCORE: 49
GIVING WAY, BUCKLING OR SHIFTING OF KNEE: I DO NOT HAVE THE SYMPTOM
WEAKNESS: THE SYMPTOM AFFECTS MY ACTIVITY MODERATELY
KNEEL ON THE FRONT OF YOUR KNEE: ACTIVITY IS VERY DIFFICULT
SIT WITH YOUR KNEE BENT: ACTIVITY IS NOT DIFFICULT
KNEE_ACTIVITY_OF_DAILY_LIVING_SUM: 49
KNEE_ACTIVITY_OF_DAILY_LIVING_SCORE: 70
STIFFNESS: THE SYMPTOM AFFECTS MY ACTIVITY SLIGHTLY
AS_A_RESULT_OF_YOUR_KNEE_INJURY,_HOW_WOULD_YOU_RATE_YOUR_CURRENT_LEVEL_OF_DAILY_ACTIVITY?: NEARLY NORMAL
GO UP STAIRS: ACTIVITY IS SOMEWHAT DIFFICULT
GO DOWN STAIRS: ACTIVITY IS SOMEWHAT DIFFICULT
WALK: ACTIVITY IS MINIMALLY DIFFICULT
STAND: ACTIVITY IS NOT DIFFICULT
LIMPING: THE SYMPTOM AFFECTS MY ACTIVITY SLIGHTLY
SQUAT: ACTIVITY IS FAIRLY DIFFICULT
HOW_WOULD_YOU_RATE_THE_OVERALL_FUNCTION_OF_YOUR_KNEE_DURING_YOUR_USUAL_DAILY_ACTIVITIES?: ABNORMAL
SWELLING: I DO NOT HAVE THE SYMPTOM
PAIN: THE SYMPTOM AFFECTS MY ACTIVITY SLIGHTLY
HOW_WOULD_YOU_RATE_THE_CURRENT_FUNCTION_OF_YOUR_KNEE_DURING_YOUR_USUAL_DAILY_ACTIVITIES_ON_A_SCALE_FROM_0_TO_100_WITH_100_BEING_YOUR_LEVEL_OF_KNEE_FUNCTION_PRIOR_TO_YOUR_INJURY_AND_0_BEING_THE_INABILITY_TO_PERFORM_ANY_OF_YOUR_USUAL_DAILY_ACTIVITIES?: 75

## 2021-08-13 NOTE — PROGRESS NOTES
Physical Therapy Initial Evaluation  Subjective:  Patient is referred with a 6 day hx of L knee pain. On 8/7/21, he was going into his home when he twisted his L knee and felt a sharp pain. X- rays were negative. Noting minor effusion and stiffness. Sxs are worse with stairs, squatting and prolonged walking. Using SE cane and knee sleeve intermittently. 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    The history is provided by the patient.   Therapist Generated HPI Evaluation         Type of problem:  Left knee.    This is a new condition.  Condition occurred with:  A twist.  Where condition occurred: at home.    Pain is described as aching and is intermittent.  Pain is worse during the day.  Since onset symptoms are unchanged.  Associated symptoms:  Loss of motion/stiffness, edema and loss of strength. Symptoms are exacerbated by ascending stairs, descending stairs, bending/squatting and walking  and relieved by bracing/immobilizing and ice.  Special tests included:  X-ray.    Barriers include:  None as reported by patient.    Patient Health History  Emory Clark being seen for L knee pain.          Pain is reported as 2/10 on pain scale.  General health as reported by patient is good.  Pertinent medical history includes: cancer and heart problems.   Red flags:  None as reported by patient.         Current medications:  Sleep medication and cardiac medication.                                           Objective:    Gait:    Gait Type:  Antalgic   Assistive Devices:  Cane  Deviations:  Knee:  Knee extension decr L                                                      Knee Evaluation:  ROM:      PROM      Extension: Left: 13    Right:  8  Flexion: Left: 140    Right:  140      Strength:     Extension:  Left: 4/5    Pain:+      Right: 5-/5   Pain:  Flexion:  Left: 4/5    Pain:+      Right: 5/5   Pain:    Quad Set Left:  Good    Pain: ++   Quad Set Right: WNL    Pain:  Ligament  Testing:  Normal                Special Tests: Not Assessed      Palpation:    Left knee tenderness present at:  Medial Joint Line    Edema:  Edema of the knee: slight effusion and warmth noted in L knee.    Mobility Testing:  Normal            Functional Testing:  not assessed                  General     ROS    Assessment/Plan:    Patient is a 80 year old male with left side knee complaints.    Patient has the following significant findings with corresponding treatment plan.                Diagnosis 1:  Left knee pain  Pain -  hot/cold therapy, manual therapy, splint/taping/bracing/orthotics, self management, education and home program  Decreased joint mobility - manual therapy, therapeutic exercise and home program  Decreased strength - therapeutic exercise, therapeutic activities and home program  Impaired gait - gait training and home program  Impaired muscle performance - neuro re-education and home program  Decreased function - therapeutic activities and home program    Therapy Evaluation Codes:   1) History comprised of:   Personal factors that impact the plan of care:      None.    Comorbidity factors that impact the plan of care are:      Cancer and Heart problems.     Medications impacting care: Cardiac and Pain.  2) Examination of Body Systems comprised of:   Body structures and functions that impact the plan of care:      Knee.   Activity limitations that impact the plan of care are:      Squatting/kneeling, Stairs and Walking.  3) Clinical presentation characteristics are:   Evolving/Changing.  4) Decision-Making    Moderate complexity using standardized patient assessment instrument and/or measureable assessment of functional outcome.  Cumulative Therapy Evaluation is: Moderate complexity.    Previous and current functional limitations:  (See Goal Flow Sheet for this information)    Short term and Long term goals: (See Goal Flow Sheet for this information)     Communication ability:  Patient appears  to be able to clearly communicate and understand verbal and written communication and follow directions correctly.  Treatment Explanation - The following has been discussed with the patient:   RX ordered/plan of care  Anticipated outcomes  Possible risks and side effects  This patient would benefit from PT intervention to resume normal activities.   Rehab potential is good.    Frequency:  1 X week, once daily  Duration:  for 4 weeks  Discharge Plan:  Achieve all LTG.  Independent in home treatment program.  Reach maximal therapeutic benefit.    Please refer to the daily flowsheet for treatment today, total treatment time and time spent performing 1:1 timed codes.

## 2021-08-16 ENCOUNTER — THERAPY VISIT (OUTPATIENT)
Dept: PHYSICAL THERAPY | Facility: CLINIC | Age: 81
End: 2021-08-16
Payer: COMMERCIAL

## 2021-08-16 DIAGNOSIS — M25.562 ACUTE PAIN OF LEFT KNEE: ICD-10-CM

## 2021-08-16 PROCEDURE — 97110 THERAPEUTIC EXERCISES: CPT | Mod: GP | Performed by: PHYSICAL THERAPIST

## 2021-08-16 PROCEDURE — 97112 NEUROMUSCULAR REEDUCATION: CPT | Mod: GP | Performed by: PHYSICAL THERAPIST

## 2021-08-16 NOTE — PROGRESS NOTES
PROGRESS  REPORT    Progress reporting period is from eval to 8/16/21.       SUBJECTIVE  Subjective changes noted by patient:  .  Subjective: Better.  Improved ROM and ability to walk.    Current pain level is  Current Pain level: 1/10.     Previous pain level was   Initial Pain level: 2/10.   Changes in function:  Yes (See Goal flowsheet attached for changes in current functional level)  Adverse reaction to treatment or activity: None    OBJECTIVE  Changes noted in objective findings:  Yes,   Objective: ROM 11-0-140,  strength 3+/5 on left quad and hamstring     ASSESSMENT/PLAN  Updated problem list and treatment plan: Diagnosis 1:  L knee pain  Pain -  self management, education, directional preference exercise and home program  Decreased ROM/flexibility - manual therapy and therapeutic exercise  Decreased strength - therapeutic exercise and therapeutic activities  Impaired muscle performance - neuro re-education  Decreased function - therapeutic activities  STG/LTGs have been met or progress has been made towards goals:  Yes (See Goal flow sheet completed today.)  Assessment of Progress: The patient's condition is improving.  The patient's condition has potential to improve.  Self Management Plans:  Patient has been instructed in a home treatment program.  Patient is independent in a home treatment program.  I have re-evaluated this patient and find that the nature, scope, duration and intensity of the therapy is appropriate for the medical condition of the patient.  Emory continues to require the following intervention to meet STG and LTG's:  PT    Recommendations:  This patient is ready to be discharged from therapy and continue their home treatment program.    Please refer to the daily flowsheet for treatment today, total treatment time and time spent performing 1:1 timed codes.

## 2021-10-03 ENCOUNTER — HEALTH MAINTENANCE LETTER (OUTPATIENT)
Age: 81
End: 2021-10-03

## 2021-12-13 ENCOUNTER — THERAPY VISIT (OUTPATIENT)
Dept: PHYSICAL THERAPY | Facility: CLINIC | Age: 81
End: 2021-12-13
Payer: COMMERCIAL

## 2021-12-13 DIAGNOSIS — M25.512 CHRONIC PAIN OF BOTH SHOULDERS: ICD-10-CM

## 2021-12-13 DIAGNOSIS — M25.511 CHRONIC PAIN OF BOTH SHOULDERS: ICD-10-CM

## 2021-12-13 DIAGNOSIS — G89.29 CHRONIC PAIN OF BOTH SHOULDERS: ICD-10-CM

## 2021-12-13 PROCEDURE — 97161 PT EVAL LOW COMPLEX 20 MIN: CPT | Mod: GP | Performed by: PHYSICAL THERAPIST

## 2021-12-13 PROCEDURE — 97110 THERAPEUTIC EXERCISES: CPT | Mod: GP | Performed by: PHYSICAL THERAPIST

## 2021-12-13 NOTE — PROGRESS NOTES
[facility]    OUTPATIENT Physical Therapy ORTHOPEDIC EVALUATION  PLAN OF TREATMENT FOR OUTPATIENT REHABILITATION  (COMPLETE FOR INITIAL CLAIMS ONLY)  Patient's Last Name, First Name, M.I.  YOB: 1940  Emory Clark    Provider s Name:  [facility]   Medical Record No.  [id number]   Start of Care Date:  12/13/21   Onset Date:   11/30/21 (MD appt )   Type:     _X__PT   ___OT Medical Diagnosis:    Encounter Diagnosis   Name Primary?     Chronic pain of both shoulders         Treatment Diagnosis:  B shoulder pain        Goals:     12/13/21 0500   Body Part   Goals listed below are for B shoulder pain   Goal #1   Goal #1 reaching   Previous Functional Level No restrictions   Current Functional Level Can reach ;to shoulder level   Performance level pain 6/10   STG Target Performance Reach ;overhead   Performance level pain 3/10   Rationale for independent personal hygiene;for dressing;for bathing   Due date 01/03/22   LTG Target Performance Reach;overhead   Performance Level pain 2/10   Rationale for independent personal hygiene;for dressing;for bathing   Due date 01/24/22       Therapy Frequency:  1 x week  Predicted Duration of Therapy Intervention:  6 weeks    Fabrice Lorenz, PT                 I CERTIFY THE NEED FOR THESE SERVICES FURNISHED UNDER        THIS PLAN OF TREATMENT AND WHILE UNDER MY CARE .             Physician Signature               Date    X_____________________________________________________                             Certification Date From:  12/13/21   Certification Date To:  03/12/22    Referring Provider:  Etelvina Shen    Initial Assessment        See Epic Evaluation SOC Date: 12/13/21

## 2021-12-13 NOTE — PROGRESS NOTES
Physical Therapy Initial Evaluation  Subjective:  The history is provided by the patient. No  was used.   Patient Health History  Emory Clark being seen for B shoulder pain.     Date of Onset: over a year ago.   Problem occurred: unknown   Pain is reported as 6/10 on pain scale.  General health as reported by patient is good.  Pertinent medical history includes: cancer and heart problems.   Red flags:  None as reported by patient.  Medical allergies: See EPIC.   Surgeries include:  None.    Current medications:  Cardiac medication (See EPIC).    Current occupation is retired.                     Therapist Generated HPI Evaluation  Problem details: Pt. complains of bilateral shoulder pain and decreased ROM that has been present for over 1 year.  No known trauma.  PT order dated 11/30/21.      .         Type of problem:  Bilateral shoulders.    This is a chronic condition.  Condition occurred with:  Unknown cause.  Where condition occurred: for unknown reasons.  Patient reports pain:  In the joint.  Pain is described as aching and is intermittent.  Pain radiates to:  Shoulder. Pain is worse during the day.  Since onset symptoms are unchanged.  Associated symptoms:  Loss of motion/stiffness. Symptoms are exacerbated by using arm at shoulder level, using arm overhead and using arm behind back  and relieved by rest.      Barriers include:  None as reported by patient.                        Objective:        Flexibility/Screens:   Positive screens:  Shoulder                             Shoulder Evaluation:  ROM:  AROM:    Flexion:  Left:  90    Right:  95    Abduction:  Left: 85   Right:  90                      PROM:    Flexion:  Left:  90    Right: 95      Abduction:  Left:  85    Right:  90    Internal Rotation:  Left:  50    Right:  55  External Rotation:  Left:  10    Right:  30                    Strength:  normal                          Palpation:  normal      Mobility Tests:  Mobility  wnl shoulder: capsular patterm present bilaterally.                                                 General     ROS    Assessment/Plan:    Patient is a 81 year old male with both sides shoulder complaints.    Patient has the following significant findings with corresponding treatment plan.                Diagnosis 1:  B shoulder pain  Pain -  self management, education, directional preference exercise and home program  Decreased ROM/flexibility - manual therapy and therapeutic exercise  Decreased joint mobility - manual therapy and therapeutic exercise  Impaired muscle performance - neuro re-education  Decreased function - therapeutic activities    Therapy Evaluation Codes:   1) Clinical presentation characteristics are:   Stable/Uncomplicated.  2) Decision-Making    Low complexity using standardized patient assessment instrument and/or measureable assessment of functional outcome.  Cumulative Therapy Evaluation is: Low complexity.    Previous and current functional limitations:  (See Goal Flow Sheet for this information)    Short term and Long term goals: (See Goal Flow Sheet for this information)     Communication ability:  Patient appears to be able to clearly communicate and understand verbal and written communication and follow directions correctly.  Treatment Explanation - The following has been discussed with the patient:   RX ordered/plan of care  Anticipated outcomes  Possible risks and side effects  This patient would benefit from PT intervention to resume normal activities.   Rehab potential is fair.    Frequency:  1 X week, once daily  Duration:  for 6 weeks  Discharge Plan:  Achieve all LTG.  Independent in home treatment program.  Reach maximal therapeutic benefit.    Please refer to the daily flowsheet for treatment today, total treatment time and time spent performing 1:1 timed codes.

## 2021-12-20 ENCOUNTER — THERAPY VISIT (OUTPATIENT)
Dept: PHYSICAL THERAPY | Facility: CLINIC | Age: 81
End: 2021-12-20
Payer: COMMERCIAL

## 2021-12-20 DIAGNOSIS — G89.29 CHRONIC PAIN OF BOTH SHOULDERS: ICD-10-CM

## 2021-12-20 DIAGNOSIS — M25.511 CHRONIC PAIN OF BOTH SHOULDERS: ICD-10-CM

## 2021-12-20 DIAGNOSIS — M25.512 CHRONIC PAIN OF BOTH SHOULDERS: ICD-10-CM

## 2021-12-20 PROCEDURE — 97110 THERAPEUTIC EXERCISES: CPT | Mod: GP | Performed by: PHYSICAL THERAPIST

## 2022-02-10 PROBLEM — G89.29 CHRONIC PAIN OF BOTH SHOULDERS: Status: RESOLVED | Noted: 2021-12-13 | Resolved: 2022-02-10

## 2022-02-10 PROBLEM — M25.512 CHRONIC PAIN OF BOTH SHOULDERS: Status: RESOLVED | Noted: 2021-12-13 | Resolved: 2022-02-10

## 2022-02-10 PROBLEM — M25.511 CHRONIC PAIN OF BOTH SHOULDERS: Status: RESOLVED | Noted: 2021-12-13 | Resolved: 2022-02-10

## 2022-05-15 ENCOUNTER — HEALTH MAINTENANCE LETTER (OUTPATIENT)
Age: 82
End: 2022-05-15

## 2022-06-23 ENCOUNTER — LAB (OUTPATIENT)
Dept: LAB | Facility: CLINIC | Age: 82
End: 2022-06-23
Payer: COMMERCIAL

## 2022-06-23 DIAGNOSIS — Z20.822 CLOSE EXPOSURE TO 2019 NOVEL CORONAVIRUS: ICD-10-CM

## 2022-06-23 PROCEDURE — U0005 INFEC AGEN DETEC AMPLI PROBE: HCPCS

## 2022-06-23 PROCEDURE — U0003 INFECTIOUS AGENT DETECTION BY NUCLEIC ACID (DNA OR RNA); SEVERE ACUTE RESPIRATORY SYNDROME CORONAVIRUS 2 (SARS-COV-2) (CORONAVIRUS DISEASE [COVID-19]), AMPLIFIED PROBE TECHNIQUE, MAKING USE OF HIGH THROUGHPUT TECHNOLOGIES AS DESCRIBED BY CMS-2020-01-R: HCPCS

## 2022-06-24 LAB — SARS-COV-2 RNA RESP QL NAA+PROBE: NEGATIVE

## 2022-06-25 RX ORDER — COVID-19 ANTIGEN TEST
1 KIT MISCELLANEOUS DAILY
Qty: 5 KIT | Refills: 0 | Status: SHIPPED | OUTPATIENT
Start: 2022-06-25

## 2022-09-10 ENCOUNTER — HEALTH MAINTENANCE LETTER (OUTPATIENT)
Age: 82
End: 2022-09-10

## 2023-06-03 ENCOUNTER — HEALTH MAINTENANCE LETTER (OUTPATIENT)
Age: 83
End: 2023-06-03

## 2024-02-14 ENCOUNTER — OFFICE VISIT (OUTPATIENT)
Dept: URGENT CARE | Facility: URGENT CARE | Age: 84
End: 2024-02-14
Payer: COMMERCIAL

## 2024-02-14 VITALS
HEART RATE: 116 BPM | TEMPERATURE: 97.7 F | DIASTOLIC BLOOD PRESSURE: 70 MMHG | SYSTOLIC BLOOD PRESSURE: 130 MMHG | BODY MASS INDEX: 29.29 KG/M2 | WEIGHT: 216 LBS | OXYGEN SATURATION: 96 %

## 2024-02-14 DIAGNOSIS — Z20.818 EXPOSURE TO STREP THROAT: ICD-10-CM

## 2024-02-14 DIAGNOSIS — J20.9 ACUTE BRONCHITIS WITH COEXISTING CONDITION REQUIRING PROPHYLACTIC TREATMENT: Primary | ICD-10-CM

## 2024-02-14 DIAGNOSIS — D84.9 IMMUNOSUPPRESSION (H): ICD-10-CM

## 2024-02-14 LAB
DEPRECATED S PYO AG THROAT QL EIA: NEGATIVE
GROUP A STREP BY PCR: NOT DETECTED

## 2024-02-14 PROCEDURE — 99203 OFFICE O/P NEW LOW 30 MIN: CPT | Performed by: PHYSICIAN ASSISTANT

## 2024-02-14 PROCEDURE — 87651 STREP A DNA AMP PROBE: CPT | Performed by: PHYSICIAN ASSISTANT

## 2024-02-14 RX ORDER — APIXABAN 5 MG/1
1 TABLET, FILM COATED ORAL 2 TIMES DAILY
COMMUNITY
Start: 2023-05-18

## 2024-02-14 RX ORDER — DOXYCYCLINE 100 MG/1
100 CAPSULE ORAL 2 TIMES DAILY
Qty: 20 CAPSULE | Refills: 0 | Status: SHIPPED | OUTPATIENT
Start: 2024-02-14 | End: 2024-02-24

## 2024-02-14 RX ORDER — LOSARTAN POTASSIUM 25 MG/1
25 TABLET ORAL DAILY
COMMUNITY
Start: 2024-01-11

## 2024-02-14 RX ORDER — METOPROLOL SUCCINATE 25 MG/1
TABLET, EXTENDED RELEASE ORAL
COMMUNITY
Start: 2023-04-06

## 2024-02-14 RX ORDER — ALBUTEROL SULFATE 90 UG/1
2 AEROSOL, METERED RESPIRATORY (INHALATION) EVERY 6 HOURS PRN
Qty: 8.5 G | Refills: 0 | Status: SHIPPED | OUTPATIENT
Start: 2024-02-14

## 2024-02-14 RX ORDER — TRAMADOL HYDROCHLORIDE 50 MG/1
50 TABLET ORAL EVERY 6 HOURS PRN
COMMUNITY

## 2024-02-14 NOTE — PROGRESS NOTES
Assessment & Plan     Acute bronchitis with coexisting condition requiring prophylactic treatment  Patient with immunosuppressed status.  Doxycycline is prescribed.  Albuterol inhaler Rx as needed for chest tightness/shortness of breath.  On exam he is in no acute respiratory distress.  Vitals are stable.  Patient educational information provided regarding course of symptoms.  Advised to keep monitoring symptoms.  Follow-up if any worsening symptoms.  Patient agrees.  - doxycycline hyclate (VIBRAMYCIN) 100 MG capsule  Dispense: 20 capsule; Refill: 0  - albuterol (PROAIR HFA/PROVENTIL HFA/VENTOLIN HFA) 108 (90 Base) MCG/ACT inhaler  Dispense: 8.5 g; Refill: 0    Immunosuppression (H24)  Please see above recommendations.  - doxycycline hyclate (VIBRAMYCIN) 100 MG capsule  Dispense: 20 capsule; Refill: 0    Exposure to strep throat  Rapid strep test is negative today.  Throat culture is pending.  - Streptococcus A Rapid Screen w/Reflex to PCR - Clinic Collect  - Group A Streptococcus PCR Throat Swab       Return in about 1 week (around 2/21/2024) for Symptoms failing to improve.    Jadyn Cornejo PA-C  [facility] [place]    Kerry Abdul is a 83 year old male who presents to clinic today for the following health issues:  Chief Complaint   Patient presents with    Urgent Care     X4 Days cough, sob, productive cough, headache, fatigued, nasal congestion,   Covid test negative at home this am, exposed to strep last Tuesday   Taking nyquil and dayquil , strep test requested      HPI  Patient with medical history significant for A-fib, on Eliquis, CLL with immunosuppressed status, diverticulosis, NSTEMI,  among others presenting to urgent care today with a complaint of a productive cough.  Onset of symptoms 4 days ago.  Patient notes symptoms are worsening.  Patient notes he feels fatigued, has a mild headache.  No fever. Reports chest tightness and mild shortness of breath.  He reports a  negative home COVID test this morning.  Exposure to strep a week ago, he would like to have a strep test today. No ST.  Patient is concerned about pneumonia.  Treatment tried: NyQuil, DayQuil.      Review of Systems  Constitutional, HEENT, cardiovascular, pulmonary, GI, , musculoskeletal, neuro, skin, endocrine and psych systems are negative, except as otherwise noted.      Objective    /70   Pulse 116   Temp 97.7  F (36.5  C) (Tympanic)   Wt 98 kg (216 lb)   SpO2 96%   BMI 29.29 kg/m    Physical Exam   GENERAL: alert and no distress  EYES: Eyes grossly normal to inspection, conjunctivae and sclerae normal  HENT: ear canals and TM's normal, mouth without ulcers or lesions, throat is moist and pink, uvula is midline  RESP: lungs with coarse breath sounds over the lung fields, no wheezing  CV: regular rate and rhythm, normal S1 S2  MS: no gross musculoskeletal defects noted, no edema  SKIN: no suspicious lesions or rashes    Results for orders placed or performed in visit on 02/14/24 (from the past 24 hour(s))   Streptococcus A Rapid Screen w/Reflex to PCR - Clinic Collect    Specimen: Throat; Swab   Result Value Ref Range    Group A Strep antigen Negative Negative

## 2024-06-17 PROBLEM — Z71.89 ADVANCED DIRECTIVES, COUNSELING/DISCUSSION: Status: RESOLVED | Noted: 2017-09-21 | Resolved: 2024-06-17

## 2024-07-07 ENCOUNTER — HEALTH MAINTENANCE LETTER (OUTPATIENT)
Age: 84
End: 2024-07-07

## 2024-08-04 ENCOUNTER — OFFICE VISIT (OUTPATIENT)
Dept: URGENT CARE | Facility: URGENT CARE | Age: 84
End: 2024-08-04
Payer: COMMERCIAL

## 2024-08-04 VITALS
OXYGEN SATURATION: 97 % | HEART RATE: 73 BPM | SYSTOLIC BLOOD PRESSURE: 117 MMHG | TEMPERATURE: 98.5 F | RESPIRATION RATE: 20 BRPM | DIASTOLIC BLOOD PRESSURE: 73 MMHG

## 2024-08-04 DIAGNOSIS — D84.9 IMMUNOSUPPRESSION (H): ICD-10-CM

## 2024-08-04 DIAGNOSIS — B37.0 ORAL THRUSH: Primary | ICD-10-CM

## 2024-08-04 PROCEDURE — 99213 OFFICE O/P EST LOW 20 MIN: CPT | Performed by: PHYSICIAN ASSISTANT

## 2024-08-04 RX ORDER — ROSUVASTATIN CALCIUM 5 MG/1
5 TABLET, COATED ORAL
COMMUNITY
Start: 2024-07-03

## 2024-08-04 RX ORDER — NYSTATIN 100000/ML
500000 SUSPENSION, ORAL (FINAL DOSE FORM) ORAL 4 TIMES DAILY
Qty: 280 ML | Refills: 0 | Status: SHIPPED | OUTPATIENT
Start: 2024-08-04 | End: 2024-08-18

## 2024-08-04 NOTE — PROGRESS NOTES
Assessment & Plan     Oral thrush  Nystatin suspension is prescribed.  Patient educational information provided regarding course of symptoms.  Avoid acidic foods.  Soft and cold foods are recommended.  Please keep monitoring symptoms.  Follow-up if any worsening symptoms.  Patient agrees with the plan.  - nystatin (MYCOSTATIN) [id number] UNIT/ML suspension  Dispense: 280 mL; Refill: 0    Immunosuppression (H24)  Suspect oral thrush in the setting of suppressed immune system.  Please see above recommendations.       Return in about 1 week (around 8/11/2024) for Symptoms failing to improve.    Jadyn Cornejo PA-C  [facility] [place] is [place] 83 year old male who presents to clinic today for the following health issues:  Chief Complaint   Patient presents with    Mouth Problem     Sore tongue with some white coating for two days         8/4/2024    10:13 AM   Additional Questions   Accompanied by spouse     HPI    Patient with medical history significant for A-fib on Eliquis, CLL with immunosuppressed status, diverticulosis, NSTEMI, among others presenting to urgent care today with a complaint of  whitish lesions on his tongue and tongue burning sensation.  Onset of symptoms 2 days ago.  He notes he has been using his albuterol inhaler as needed in the past few days.  Has also been using a mouthwash with hydrogen peroxide. No fever/chills.      Review of Systems  Constitutional, HEENT, cardiovascular, pulmonary, GI, , musculoskeletal, neuro, skin, endocrine and psych systems are negative, except as otherwise noted.      Objective    /73 (BP Location: Right arm, Patient Position: Sitting, Cuff Size: Adult Large)   Pulse 73   Temp 98.5  F (36.9  C) (Oral)   Resp 20   SpO2 97%   Physical Exam   GENERAL: alert and no distress  HENT: Whitish lesions noted on his tongue and posterior pharynx consistent with thrush, there is mild posterior pharynx erythema, uvula is  midline  RESP: lungs clear to auscultation - no rales, rhonchi or wheezes  CV: regular rate and rhythm, normal S1 S2  MS: no gross musculoskeletal defects noted, no edema

## 2024-08-14 ENCOUNTER — OFFICE VISIT (OUTPATIENT)
Dept: URGENT CARE | Facility: URGENT CARE | Age: 84
End: 2024-08-14
Payer: COMMERCIAL

## 2024-08-14 VITALS
DIASTOLIC BLOOD PRESSURE: 62 MMHG | WEIGHT: 216 LBS | SYSTOLIC BLOOD PRESSURE: 118 MMHG | RESPIRATION RATE: 18 BRPM | OXYGEN SATURATION: 96 % | HEART RATE: 55 BPM | BODY MASS INDEX: 29.29 KG/M2 | TEMPERATURE: 97.6 F

## 2024-08-14 DIAGNOSIS — B37.0 THRUSH: Primary | ICD-10-CM

## 2024-08-14 PROCEDURE — 99213 OFFICE O/P EST LOW 20 MIN: CPT | Performed by: FAMILY MEDICINE

## 2024-08-14 RX ORDER — FLUCONAZOLE 100 MG/1
100 TABLET ORAL DAILY
Qty: 14 TABLET | Refills: 0 | Status: SHIPPED | OUTPATIENT
Start: 2024-08-14 | End: 2024-08-28

## 2024-08-14 NOTE — PATIENT INSTRUCTIONS
Start fluconazole 100 mg daily for the next 14 days to treat the thrush infection.  You can finish up the last few days of nystatin as well for extra antifungal coverage.    If you are not seeing any improvement within a week, please discuss with your primary care provider.

## 2024-08-14 NOTE — PROGRESS NOTES
ICD-10-CM    1. Thrush  B37.0 fluconazole (DIFLUCAN) 100 MG tablet        Nystatin provided temporary improvement but not complete clearance.  Will switch to fluconazole.    PLAN:  Patient Instructions   Start fluconazole 100 mg daily for the next 14 days to treat the thrush infection.  You can finish up the last few days of nystatin as well for extra antifungal coverage.    If you are not seeing any improvement within a week, please discuss with your primary care provider.    SUBJECTIVE:  Emory Clark is a 83 year old male who presents to  today with reappearance of thrush-like white patches on his tongue.  Yesterday felt mildly ill while out golfing and noticed that the single white patch he had noticed had multiplied.  Started nystatin 4x per day on 8/4 and has a small amount of medication left still.    OBJECTIVE:  /62   Pulse 55   Temp 97.6  F (36.4  C)   Resp 18   Wt 98 kg (216 lb)   SpO2 96%   BMI 29.29 kg/m    GEN: well-appearing, in NAD  ENT: oral MMM, multiple small white adherent patches on the tongue, pharynx appears normal, uvula midline

## 2025-03-30 ENCOUNTER — HEALTH MAINTENANCE LETTER (OUTPATIENT)
Age: 85
End: 2025-03-30

## 2025-05-12 ENCOUNTER — OFFICE VISIT (OUTPATIENT)
Dept: URGENT CARE | Facility: URGENT CARE | Age: 85
End: 2025-05-12
Payer: COMMERCIAL

## 2025-05-12 VITALS
HEIGHT: 72 IN | SYSTOLIC BLOOD PRESSURE: 159 MMHG | TEMPERATURE: 98 F | RESPIRATION RATE: 18 BRPM | DIASTOLIC BLOOD PRESSURE: 72 MMHG | HEART RATE: 84 BPM | BODY MASS INDEX: 27.5 KG/M2 | OXYGEN SATURATION: 97 % | WEIGHT: 203 LBS

## 2025-05-12 DIAGNOSIS — J01.00 ACUTE NON-RECURRENT MAXILLARY SINUSITIS: Primary | ICD-10-CM

## 2025-05-12 PROCEDURE — 3077F SYST BP >= 140 MM HG: CPT | Performed by: PHYSICIAN ASSISTANT

## 2025-05-12 PROCEDURE — 99213 OFFICE O/P EST LOW 20 MIN: CPT | Performed by: PHYSICIAN ASSISTANT

## 2025-05-12 PROCEDURE — 3078F DIAST BP <80 MM HG: CPT | Performed by: PHYSICIAN ASSISTANT

## 2025-05-12 RX ORDER — AMOXICILLIN 500 MG/1
2000 CAPSULE ORAL
COMMUNITY
Start: 2025-01-18

## 2025-05-12 RX ORDER — BISOPROLOL FUMARATE 5 MG/1
5 TABLET, FILM COATED ORAL
COMMUNITY
Start: 2025-03-24

## 2025-05-12 RX ORDER — ACYCLOVIR 400 MG/1
400 TABLET ORAL
COMMUNITY
Start: 2025-03-24

## 2025-05-12 NOTE — PROGRESS NOTES
URGENT CARE VISIT:    SUBJECTIVE:   Emory Clark is a 84 year old male presenting with a chief complaint of stuffy nose and facial pain/pressure.  Onset was 1.5 week(s) ago. Sinus pressure started 4 days ago.  He denies the following symptoms: cough - productive and shortness of breath  Course of illness is same.    Treatment measures tried include Mucinex tried with no relief of symptoms.  Predisposing factors include None.    PMH:   Past Medical History:   Diagnosis Date    Basal cell carcinoma of face     CARDIOVASCULAR SCREENING; LDL GOAL LESS THAN 160 1/8/2014    CLL (chronic lymphocytic leukaemia)     Elevated PSA     GERD (gastroesophageal reflux disease)     Insomnia     Pleural effusion 7/10/2014     Allergies: Antihistamine [diphenhydramine hcl], Asa [aspirin], Ethanolamine, and Pseudoephedrine   Medications:   Current Outpatient Medications   Medication Sig Dispense Refill    acyclovir (ZOVIRAX) 400 MG tablet Take 400 mg by mouth.      amoxicillin (AMOXIL) 500 MG capsule Take 2,000 mg by mouth.      amoxicillin-clavulanate (AUGMENTIN) 875-125 MG tablet Take 1 tablet by mouth 2 times daily for 7 days. 14 tablet 0    B Complex Vitamins (VITAMIN B COMPLEX PO) Take 1 tablet by mouth daily       bisoprolol (ZEBETA) 5 MG tablet Take 5 mg by mouth.      ELIQUIS ANTICOAGULANT 5 MG tablet Take 1 tablet by mouth 2 times daily      losartan (COZAAR) 25 MG tablet Take 25 mg by mouth daily      Multiple Vitamin (MULTIVITAMINS PO) Take 1 tablet by mouth daily       Multiple Vitamins-Minerals (PRESERVISION/LUTEIN) CAPS Take 1 capsule by mouth 2 times daily       rosuvastatin (CRESTOR) 5 MG tablet Take 5 mg by mouth      tamsulosin (FLOMAX) 0.4 MG capsule Take 0.4 mg by mouth every evening      traZODone (DESYREL) 50 MG tablet Take 50 mg by mouth At Bedtime      vitamin C (ASCORBIC ACID) 1000 MG TABS Take 1,000 mg by mouth every other day      Vitamin D3 (CHOLECALCIFEROL) 25 mcg (1000 units) tablet Take 50 mcg by  mouth every evening      zinc gluconate 50 MG tablet Take 50 mg by mouth every other day      albuterol (PROAIR HFA/PROVENTIL HFA/VENTOLIN HFA) 108 (90 Base) MCG/ACT inhaler Inhale 2 puffs into the lungs every 6 hours as needed for shortness of breath or cough (Patient not taking: Reported on 5/12/2025) 8.5 g 0    COVID-19 At Home Antigen Test KIT 1 kit by In Vitro route daily Test daily x 5 days with recent exposure to COVID.  Hx of CLL. (Patient not taking: Reported on 5/12/2025) 5 kit 0    metoprolol succinate ER (TOPROL XL) 25 MG 24 hr tablet TAKE 1/2 TABLET BY MOUTH DAILY DO NOT CRUSH OR CHEW (Patient not taking: Reported on 5/12/2025)      metoprolol tartrate (LOPRESSOR) 25 MG tablet Take 0.5 tablets (12.5 mg) by mouth 2 times daily Hold for heart rate <60 (Patient not taking: Reported on 5/12/2025) 30 tablet 0    traMADol (ULTRAM) 50 MG tablet Take 50 mg by mouth every 6 hours as needed for severe pain (Patient not taking: Reported on 5/12/2025)       Social History:   Social History     Tobacco Use    Smoking status: Never    Smokeless tobacco: Never   Substance Use Topics    Alcohol use: Yes     Alcohol/week: 0.0 standard drinks of alcohol     Comment: rare       ROS:  Review of systems negative except as stated above.    OBJECTIVE:  BP (!) 159/72   Pulse 84   Temp 98  F (36.7  C) (Oral)   Resp 18   Ht 1.829 m (6')   Wt 92.1 kg (203 lb)   SpO2 97%   BMI 27.53 kg/m    GENERAL APPEARANCE: healthy, alert and no distress  EYES: EOMI,  PERRL, conjunctiva clear  HENT: ear canals and TM's normal.  Nose and mouth without ulcers, erythema or lesions  NECK: supple, nontender, no lymphadenopathy  RESP: lungs clear to auscultation - no rales, rhonchi or wheezes  CV: regular rates and rhythm, normal S1 S2, no murmur noted  SKIN: no suspicious lesions or rashes      ASSESSMENT:    ICD-10-CM    1. Acute non-recurrent maxillary sinusitis  J01.00 amoxicillin-clavulanate (AUGMENTIN) 875-125 MG tablet           PLAN:  Patient Instructions   Patient was educated on the natural course of condition.  Take medications as prescribed. Side effects may include stomachache or diarrhea. Conservative measures include increased fluids, nasal saline irrigation (neti pot), warm steamy shower, expectorants (Mucinex), and analgesics (Tylenol). See your primary care provider if symptoms worsen or do not improve in 7 days. Seek emergency care if you develop fever over 103 or shortness of breath.      Patient verbalized understanding and is agreeable to plan. The patient was discharged ambulatory and in stable condition.    Lo Banuelos PA-C ....................  5/12/2025   2:18 PM

## 2025-05-12 NOTE — PROGRESS NOTES
Urgent Care Clinic Visit    Chief Complaint   Patient presents with    Urgent Care     Congestion and sinus pressure x 4 days , cough not productive.               5/12/2025     1:48 PM   Additional Questions   Roomed by Hanna CAMACHO   Accompanied by wife

## 2025-05-12 NOTE — PATIENT INSTRUCTIONS
Patient was educated on the natural course of condition.  Take medications as prescribed. Side effects may include stomachache or diarrhea. Conservative measures include increased fluids, nasal saline irrigation (neti pot), warm steamy shower, expectorants (Mucinex), and analgesics (Tylenol). See your primary care provider if symptoms worsen or do not improve in 7 days. Seek emergency care if you develop fever over 103 or shortness of breath.

## 2025-07-31 ENCOUNTER — OFFICE VISIT (OUTPATIENT)
Dept: URGENT CARE | Facility: URGENT CARE | Age: 85
End: 2025-07-31
Payer: COMMERCIAL

## 2025-07-31 ENCOUNTER — ANCILLARY PROCEDURE (OUTPATIENT)
Dept: GENERAL RADIOLOGY | Facility: CLINIC | Age: 85
End: 2025-07-31
Attending: FAMILY MEDICINE
Payer: COMMERCIAL

## 2025-07-31 ENCOUNTER — VIRTUAL VISIT (OUTPATIENT)
Dept: URGENT CARE | Facility: CLINIC | Age: 85
End: 2025-07-31
Payer: COMMERCIAL

## 2025-07-31 VITALS
HEIGHT: 72 IN | WEIGHT: 197 LBS | RESPIRATION RATE: 18 BRPM | OXYGEN SATURATION: 98 % | HEART RATE: 64 BPM | TEMPERATURE: 98.6 F | DIASTOLIC BLOOD PRESSURE: 64 MMHG | SYSTOLIC BLOOD PRESSURE: 122 MMHG | BODY MASS INDEX: 26.68 KG/M2

## 2025-07-31 DIAGNOSIS — R05.1 ACUTE COUGH: Primary | ICD-10-CM

## 2025-07-31 DIAGNOSIS — J18.9 PNEUMONIA OF LEFT LOWER LOBE DUE TO INFECTIOUS ORGANISM: ICD-10-CM

## 2025-07-31 DIAGNOSIS — J22 LOWER RESPIRATORY INFECTION: Primary | ICD-10-CM

## 2025-07-31 DIAGNOSIS — C91.00 ACUTE LYMPHOBLASTIC LEUKEMIA (ALL) IN ADULT (H): ICD-10-CM

## 2025-07-31 DIAGNOSIS — J22 LOWER RESPIRATORY INFECTION: ICD-10-CM

## 2025-07-31 DIAGNOSIS — C91.10 CHRONIC LYMPHOID LEUKEMIA (H): ICD-10-CM

## 2025-07-31 RX ORDER — DAPAGLIFLOZIN 10 MG/1
10 TABLET, FILM COATED ORAL DAILY
COMMUNITY
Start: 2025-06-10 | End: 2026-06-10

## 2025-07-31 RX ORDER — DASATINIB 100 MG/1
100 TABLET, FILM COATED ORAL
COMMUNITY
Start: 2024-12-16

## 2025-07-31 RX ORDER — EPLERENONE 25 MG/1
25 TABLET ORAL DAILY
COMMUNITY
Start: 2025-06-09 | End: 2026-06-09

## 2025-07-31 RX ORDER — DOXYCYCLINE 100 MG/1
100 CAPSULE ORAL 2 TIMES DAILY
Qty: 20 CAPSULE | Refills: 0 | Status: SHIPPED | OUTPATIENT
Start: 2025-07-31 | End: 2025-08-10

## 2025-07-31 RX ORDER — SODIUM FLUORIDE 6 MG/ML
1 PASTE, DENTIFRICE DENTAL
COMMUNITY
Start: 2025-04-28

## 2025-07-31 NOTE — PROGRESS NOTES
I called and spoke with Franko.  I recommend he be seen in person for a lung exam, possible xray  He is on his way into urgent care now.    Chelsea Lowery PA-C  [facility]

## 2025-07-31 NOTE — PROGRESS NOTES
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     CXR shows LLL infiltrate-  will treat with doxycycline for pneumonia     We discussed that based on the patient's description of symptoms, history and physical exam, that a bacterial infection has likely developed in the chest requiring treatment with antibiotics.       The patient is advised to monitor the symptoms of the illness, and if worsening,  worse chest pain, increased productive sputum, persistent fevers/ chills, shortness of breath, then seek re-evaluation with primary care, UC or ER     Symptomatic measures encouraged, humidified air, plenty of fluids.  Patient may consider OTC expectorant and/or cough suppressant to treat symptoms.   acetaminophen, ibuprofen for pain and fever    Return if worsening  -------------------------------------------------------------------------------------------------------------------------------    SUBJECTIVE:  Chief Complaint   Patient presents with    Urgent Care     Cough x 3-4 days, chest congestion, coughing up intermittent phlegm, post nasal drainage- has tried nyquil and tylenol, cold eeze.      Emory Clark is a 84 year old male who presents to the clinic today with a chief complaint of cough  and lung congestion for 4 day(s).  Patient denies shortness of breath., central chest pain., pleuritic chest pain, and wheezing.  His cough is described as occasional, daytime,  nightime, and nonproductive.    The patient's symptoms are mild and worsening.  Associated symptoms include nasal congestion and malaise. The patient's symptoms are exacerbated by no particular triggers  Patient has been using OTC cough suppressants  to improve symptoms.    He has a weakened immune system due to history of ALL and CLL with increased risk of bacterial infections    Past Medical History:   Diagnosis Date    Basal cell carcinoma of face     CARDIOVASCULAR SCREENING; LDL GOAL LESS THAN 160 1/8/2014    CLL (chronic lymphocytic leukaemia)     Elevated PSA     GERD (gastroesophageal reflux disease)     Insomnia     Pleural effusion 7/10/2014     Patient Active Problem List   Diagnosis    Chronic lymphoid leukemia in remission (H)    Elevated prostate specific antigen (PSA)    Insomnia    GERD (gastroesophageal reflux disease)    CARDIOVASCULAR SCREENING; LDL GOAL LESS THAN 160    Pneumonia    Abnormal EKG    Elevated brain natriuretic peptide (BNP) level    Pleural effusion    NSTEMI (non-ST elevated myocardial infarction) (H)    Cardiomyopathy (H)    Anemia associated with chemotherapy    Immunosuppression    Mild persistent asthma    History of acute renal failure    Retention of urine    Diverticulosis of large intestine    Paroxysmal atrial fibrillation (H)    Atrial flutter, paroxysmal (H)       ALLERGIES:  Antihistamine [diphenhydramine hcl], Asa [aspirin], Ethanolamine, and Pseudoephedrine    Current Outpatient Medications   Medication Sig Dispense Refill    acyclovir (ZOVIRAX) 400 MG tablet Take 400 mg by mouth.      B Complex Vitamins (VITAMIN B COMPLEX PO) Take 1 tablet by mouth daily       bisoprolol (ZEBETA) 5 MG tablet Take 5 mg by mouth.      dapagliflozin (FARXIGA) 10 MG TABS tablet Take 10 mg by mouth daily.      dasatinib (SPRYCEL) 100 MG tablet Take 100 mg by mouth      doxycycline hyclate (VIBRAMYCIN) 100 MG capsule Take 1 capsule (100 mg) by mouth 2 times daily for 10 days. 20 capsule 0     ELIQUIS ANTICOAGULANT 5 MG tablet Take 1 tablet by mouth 2 times daily      eplerenone (INSPRA) 25 MG tablet Take 25 mg by mouth daily.      losartan (COZAAR) 25 MG tablet Take 25 mg by mouth daily      Multiple Vitamin (MULTIVITAMINS PO) Take 1 tablet by mouth daily       Multiple Vitamins-Minerals (PRESERVISION/LUTEIN) CAPS Take 1 capsule by mouth 2 times daily       rosuvastatin (CRESTOR) 5 MG tablet Take 5 mg by mouth      SODIUM FLUORIDE 5000 PPM 1.1 % PSTE dental paste Take 1 Application by mouth.      tamsulosin (FLOMAX) 0.4 MG capsule Take 0.4 mg by mouth every evening      traZODone (DESYREL) 50 MG tablet Take 50 mg by mouth At Bedtime      vitamin C (ASCORBIC ACID) 1000 MG TABS Take 1,000 mg by mouth every other day      Vitamin D3 (CHOLECALCIFEROL) 25 mcg (1000 units) tablet Take 50 mcg by mouth every evening      zinc gluconate 50 MG tablet Take 50 mg by mouth every other day      albuterol (PROAIR HFA/PROVENTIL HFA/VENTOLIN HFA) 108 (90 Base) MCG/ACT inhaler Inhale 2 puffs into the lungs every 6 hours as needed for shortness of breath or cough (Patient not taking: Reported on 5/12/2025) 8.5 g 0    amoxicillin (AMOXIL) 500 MG capsule Take 2,000 mg by mouth. (Patient not taking: Reported on 7/31/2025)      COVID-19 At Home Antigen Test KIT 1 kit by In Vitro route daily Test daily x 5 days with recent exposure to COVID.  Hx of CLL. (Patient not taking: Reported on 5/12/2025) 5 kit 0    metoprolol succinate ER (TOPROL XL) 25 MG 24 hr tablet TAKE 1/2 TABLET BY MOUTH DAILY DO NOT CRUSH OR CHEW (Patient not taking: Reported on 5/12/2025)      metoprolol tartrate (LOPRESSOR) 25 MG tablet Take 0.5 tablets (12.5 mg) by mouth 2 times daily Hold for heart rate <60 (Patient not taking: Reported on 5/12/2025) 30 tablet 0    sertraline (ZOLOFT) 50 MG tablet Take 50 mg by mouth daily.      traMADol (ULTRAM) 50 MG tablet Take 50 mg by mouth every 6 hours as needed for severe pain (Patient not taking: Reported on  5/12/2025)       No current facility-administered medications for this visit.       Social History     Tobacco Use    Smoking status: Never    Smokeless tobacco: Never   Substance Use Topics    Alcohol use: Yes     Alcohol/week: 0.0 standard drinks of alcohol     Comment: rare       Family History   Problem Relation Age of Onset    Neurologic Disorder Mother         Parkinsons    Other Cancer Father         CLL         ROS  CONSTITUTIONAL:NEGATIVE for fever, chills,    INTEGUMENTARY/SKIN: NEGATIVE for worrisome rashes,  or lesions  EYES: NEGATIVE for vision changes or irritation  ENT/MOUTH: NEGATIVE for ear, mouth and throat problems    OBJECTIVE:  /64   Pulse 64   Temp 98.6  F (37  C)   Resp 18   Ht 1.829 m (6')   Wt 89.4 kg (197 lb)   SpO2 98%   BMI 26.72 kg/m    GENERAL APPEARANCE: alert, mild distress, and cooperative.  Occasional cough in clinic  EYES: EOMI,  PERRL, conjunctiva clear  HENT: ear canals and TM's normal.  Nose and mouth without ulcers, erythema or lesions  NECK: supple, nontender, no lymphadenopathy  RESP:  no rales, or wheezes.  Little bilateral rhonchi throughout both lungs  CV: regular rates and rhythm, normal S1 S2, no murmur noted  ABDOMEN:  soft, nontender, no HSM or masses and bowel sounds normal  NEURO: Normal strength and tone, sensory exam grossly normal,  normal speech and mentation  SKIN: no suspicious lesions or rashes    Chest X-ray: LLL  infiltrate, no cardiomegaly, no pneumothorax   x-ray read by me Vandana Barbosa MD